# Patient Record
Sex: MALE | Race: BLACK OR AFRICAN AMERICAN | NOT HISPANIC OR LATINO | Employment: UNEMPLOYED | URBAN - METROPOLITAN AREA
[De-identification: names, ages, dates, MRNs, and addresses within clinical notes are randomized per-mention and may not be internally consistent; named-entity substitution may affect disease eponyms.]

---

## 2024-04-22 ENCOUNTER — TELEPHONE (OUTPATIENT)
Dept: PRIMARY CARE | Facility: CLINIC | Age: 54
End: 2024-04-22

## 2024-04-22 NOTE — TELEPHONE ENCOUNTER
Raissa  from [facility] would like medical records for a quality review, Fax# [phone]

## 2024-05-21 ENCOUNTER — APPOINTMENT (OUTPATIENT)
Dept: RADIOLOGY | Facility: HOSPITAL | Age: 54
DRG: 308 | End: 2024-05-21
Payer: COMMERCIAL

## 2024-05-21 ENCOUNTER — HOSPITAL ENCOUNTER (OUTPATIENT)
Dept: CARDIOLOGY | Facility: HOSPITAL | Age: 54
Discharge: HOME | DRG: 308 | End: 2024-05-21
Payer: COMMERCIAL

## 2024-05-21 ENCOUNTER — APPOINTMENT (OUTPATIENT)
Dept: CARDIOLOGY | Facility: HOSPITAL | Age: 54
DRG: 308 | End: 2024-05-21
Payer: COMMERCIAL

## 2024-05-21 ENCOUNTER — HOSPITAL ENCOUNTER (INPATIENT)
Facility: HOSPITAL | Age: 54
LOS: 3 days | Discharge: HOME | DRG: 308 | End: 2024-05-24
Attending: EMERGENCY MEDICINE | Admitting: INTERNAL MEDICINE
Payer: COMMERCIAL

## 2024-05-21 DIAGNOSIS — R94.31 ABNORMAL ELECTROCARDIOGRAM (ECG) (EKG): ICD-10-CM

## 2024-05-21 DIAGNOSIS — Z91.199 HISTORY OF NONCOMPLIANCE WITH MEDICAL TREATMENT: ICD-10-CM

## 2024-05-21 DIAGNOSIS — F17.219 CIGARETTE NICOTINE DEPENDENCE WITH NICOTINE-INDUCED DISORDER: ICD-10-CM

## 2024-05-21 DIAGNOSIS — I47.10 SVT (SUPRAVENTRICULAR TACHYCARDIA) (CMS-HCC): Primary | ICD-10-CM

## 2024-05-21 LAB
ALBUMIN SERPL-MCNC: 4 G/DL (ref 3.5–5)
ALP BLD-CCNC: 90 U/L (ref 35–125)
ALT SERPL-CCNC: 50 U/L (ref 5–40)
AMPHETAMINES UR QL SCN>1000 NG/ML: NEGATIVE
ANION GAP SERPL CALC-SCNC: 13 MMOL/L
APPEARANCE UR: CLEAR
AST SERPL-CCNC: 86 U/L (ref 5–40)
BARBITURATES UR QL SCN>300 NG/ML: NEGATIVE
BASOPHILS # BLD AUTO: 0.12 X10*3/UL (ref 0–0.1)
BASOPHILS NFR BLD AUTO: 0.8 %
BENZODIAZ UR QL SCN>300 NG/ML: POSITIVE
BILIRUB SERPL-MCNC: 0.3 MG/DL (ref 0.1–1.2)
BILIRUB UR STRIP.AUTO-MCNC: NEGATIVE MG/DL
BUN SERPL-MCNC: 20 MG/DL (ref 8–25)
BZE UR QL SCN>300 NG/ML: NEGATIVE
CALCIUM SERPL-MCNC: 9.1 MG/DL (ref 8.5–10.4)
CANNABINOIDS UR QL SCN>50 NG/ML: POSITIVE
CHLORIDE SERPL-SCNC: 102 MMOL/L (ref 97–107)
CO2 SERPL-SCNC: 23 MMOL/L (ref 24–31)
COLOR UR: YELLOW
CREAT SERPL-MCNC: 1.1 MG/DL (ref 0.4–1.6)
EGFRCR SERPLBLD CKD-EPI 2021: 80 ML/MIN/1.73M*2
EOSINOPHIL # BLD AUTO: 0.25 X10*3/UL (ref 0–0.7)
EOSINOPHIL NFR BLD AUTO: 1.6 %
ERYTHROCYTE [DISTWIDTH] IN BLOOD BY AUTOMATED COUNT: 18.5 % (ref 11.5–14.5)
EST. AVERAGE GLUCOSE BLD GHB EST-MCNC: 117 MG/DL
FENTANYL+NORFENTANYL UR QL SCN: POSITIVE
GLUCOSE BLD MANUAL STRIP-MCNC: 106 MG/DL (ref 74–99)
GLUCOSE BLD MANUAL STRIP-MCNC: 111 MG/DL (ref 74–99)
GLUCOSE BLD MANUAL STRIP-MCNC: 95 MG/DL (ref 74–99)
GLUCOSE SERPL-MCNC: 180 MG/DL (ref 65–99)
GLUCOSE UR STRIP.AUTO-MCNC: NORMAL MG/DL
HBA1C MFR BLD: 5.7 %
HCT VFR BLD AUTO: 45.4 % (ref 41–52)
HGB BLD-MCNC: 15 G/DL (ref 13.5–17.5)
HOLD SPECIMEN: NORMAL
IMM GRANULOCYTES # BLD AUTO: 0.08 X10*3/UL (ref 0–0.7)
IMM GRANULOCYTES NFR BLD AUTO: 0.5 % (ref 0–0.9)
KETONES UR STRIP.AUTO-MCNC: NEGATIVE MG/DL
LEUKOCYTE ESTERASE UR QL STRIP.AUTO: NEGATIVE
LYMPHOCYTES # BLD AUTO: 4.22 X10*3/UL (ref 1.2–4.8)
LYMPHOCYTES NFR BLD AUTO: 27.3 %
MAGNESIUM SERPL-MCNC: 2.1 MG/DL (ref 1.6–3.1)
MAGNESIUM SERPL-MCNC: 2.2 MG/DL (ref 1.6–3.1)
MCH RBC QN AUTO: 26.6 PG (ref 26–34)
MCHC RBC AUTO-ENTMCNC: 33 G/DL (ref 32–36)
MCV RBC AUTO: 81 FL (ref 80–100)
METHADONE UR QL SCN>300 NG/ML: NEGATIVE
MONOCYTES # BLD AUTO: 0.88 X10*3/UL (ref 0.1–1)
MONOCYTES NFR BLD AUTO: 5.7 %
MUCOUS THREADS #/AREA URNS AUTO: NORMAL /LPF
NEUTROPHILS # BLD AUTO: 9.9 X10*3/UL (ref 1.2–7.7)
NEUTROPHILS NFR BLD AUTO: 64.1 %
NITRITE UR QL STRIP.AUTO: NEGATIVE
NRBC BLD-RTO: 0 /100 WBCS (ref 0–0)
NT-PROBNP SERPL-MCNC: 582 PG/ML (ref 0–138)
OPIATES UR QL SCN>300 NG/ML: NEGATIVE
OXYCODONE UR QL: NEGATIVE
PCP UR QL SCN>25 NG/ML: NEGATIVE
PH UR STRIP.AUTO: 6 [PH]
PHOSPHATE SERPL-MCNC: 3.7 MG/DL (ref 2.5–4.5)
PLATELET # BLD AUTO: 408 X10*3/UL (ref 150–450)
POTASSIUM SERPL-SCNC: 4 MMOL/L (ref 3.4–5.1)
PROT SERPL-MCNC: 6.8 G/DL (ref 5.9–7.9)
PROT UR STRIP.AUTO-MCNC: NORMAL MG/DL
RBC # BLD AUTO: 5.64 X10*6/UL (ref 4.5–5.9)
RBC # UR STRIP.AUTO: NEGATIVE /UL
RBC #/AREA URNS AUTO: NORMAL /HPF
SODIUM SERPL-SCNC: 138 MMOL/L (ref 133–145)
SP GR UR STRIP.AUTO: 1.03
TROPONIN T SERPL-MCNC: 22 NG/L
TROPONIN T SERPL-MCNC: 35 NG/L
TROPONIN T SERPL-MCNC: 36 NG/L
TSH SERPL DL<=0.05 MIU/L-ACNC: 2.62 MIU/L (ref 0.27–4.2)
UROBILINOGEN UR STRIP.AUTO-MCNC: NORMAL MG/DL
WBC # BLD AUTO: 15.5 X10*3/UL (ref 4.4–11.3)
WBC #/AREA URNS AUTO: NORMAL /HPF

## 2024-05-21 PROCEDURE — 2500000004 HC RX 250 GENERAL PHARMACY W/ HCPCS (ALT 636 FOR OP/ED): Performed by: EMERGENCY MEDICINE

## 2024-05-21 PROCEDURE — 2500000005 HC RX 250 GENERAL PHARMACY W/O HCPCS: Performed by: EMERGENCY MEDICINE

## 2024-05-21 PROCEDURE — S4991 NICOTINE PATCH NONLEGEND: HCPCS

## 2024-05-21 PROCEDURE — 96374 THER/PROPH/DIAG INJ IV PUSH: CPT | Mod: 59

## 2024-05-21 PROCEDURE — 99223 1ST HOSP IP/OBS HIGH 75: CPT | Performed by: STUDENT IN AN ORGANIZED HEALTH CARE EDUCATION/TRAINING PROGRAM

## 2024-05-21 PROCEDURE — 94640 AIRWAY INHALATION TREATMENT: CPT

## 2024-05-21 PROCEDURE — 2500000004 HC RX 250 GENERAL PHARMACY W/ HCPCS (ALT 636 FOR OP/ED)

## 2024-05-21 PROCEDURE — 83036 HEMOGLOBIN GLYCOSYLATED A1C: CPT

## 2024-05-21 PROCEDURE — 99291 CRITICAL CARE FIRST HOUR: CPT

## 2024-05-21 PROCEDURE — 81001 URINALYSIS AUTO W/SCOPE: CPT

## 2024-05-21 PROCEDURE — 83735 ASSAY OF MAGNESIUM: CPT | Performed by: INTERNAL MEDICINE

## 2024-05-21 PROCEDURE — 93005 ELECTROCARDIOGRAM TRACING: CPT

## 2024-05-21 PROCEDURE — 71045 X-RAY EXAM CHEST 1 VIEW: CPT

## 2024-05-21 PROCEDURE — 9420000001 HC RT PATIENT EDUCATION 5 MIN

## 2024-05-21 PROCEDURE — 82947 ASSAY GLUCOSE BLOOD QUANT: CPT

## 2024-05-21 PROCEDURE — 5A2204Z RESTORATION OF CARDIAC RHYTHM, SINGLE: ICD-10-PCS | Performed by: EMERGENCY MEDICINE

## 2024-05-21 PROCEDURE — 80307 DRUG TEST PRSMV CHEM ANLYZR: CPT

## 2024-05-21 PROCEDURE — 93010 ELECTROCARDIOGRAM REPORT: CPT | Performed by: INTERNAL MEDICINE

## 2024-05-21 PROCEDURE — 80053 COMPREHEN METABOLIC PANEL: CPT | Performed by: EMERGENCY MEDICINE

## 2024-05-21 PROCEDURE — 99291 CRITICAL CARE FIRST HOUR: CPT | Mod: 25 | Performed by: EMERGENCY MEDICINE

## 2024-05-21 PROCEDURE — 96375 TX/PRO/DX INJ NEW DRUG ADDON: CPT | Mod: 59

## 2024-05-21 PROCEDURE — 71045 X-RAY EXAM CHEST 1 VIEW: CPT | Performed by: RADIOLOGY

## 2024-05-21 PROCEDURE — 96361 HYDRATE IV INFUSION ADD-ON: CPT

## 2024-05-21 PROCEDURE — 2500000001 HC RX 250 WO HCPCS SELF ADMINISTERED DRUGS (ALT 637 FOR MEDICARE OP)

## 2024-05-21 PROCEDURE — 99152 MOD SED SAME PHYS/QHP 5/>YRS: CPT | Performed by: EMERGENCY MEDICINE

## 2024-05-21 PROCEDURE — 93306 TTE W/DOPPLER COMPLETE: CPT

## 2024-05-21 PROCEDURE — 94664 DEMO&/EVAL PT USE INHALER: CPT

## 2024-05-21 PROCEDURE — 84484 ASSAY OF TROPONIN QUANT: CPT | Performed by: EMERGENCY MEDICINE

## 2024-05-21 PROCEDURE — 85025 COMPLETE CBC W/AUTO DIFF WBC: CPT | Performed by: EMERGENCY MEDICINE

## 2024-05-21 PROCEDURE — 84443 ASSAY THYROID STIM HORMONE: CPT | Performed by: EMERGENCY MEDICINE

## 2024-05-21 PROCEDURE — 84100 ASSAY OF PHOSPHORUS: CPT | Performed by: INTERNAL MEDICINE

## 2024-05-21 PROCEDURE — 83735 ASSAY OF MAGNESIUM: CPT

## 2024-05-21 PROCEDURE — 36415 COLL VENOUS BLD VENIPUNCTURE: CPT | Performed by: EMERGENCY MEDICINE

## 2024-05-21 PROCEDURE — 2500000002 HC RX 250 W HCPCS SELF ADMINISTERED DRUGS (ALT 637 FOR MEDICARE OP, ALT 636 FOR OP/ED)

## 2024-05-21 PROCEDURE — 2020000001 HC ICU ROOM DAILY

## 2024-05-21 PROCEDURE — 83880 ASSAY OF NATRIURETIC PEPTIDE: CPT | Performed by: EMERGENCY MEDICINE

## 2024-05-21 PROCEDURE — 2500000005 HC RX 250 GENERAL PHARMACY W/O HCPCS

## 2024-05-21 RX ORDER — IBUPROFEN 200 MG
1 TABLET ORAL DAILY
Status: DISCONTINUED | OUTPATIENT
Start: 2024-05-21 | End: 2024-05-24 | Stop reason: HOSPADM

## 2024-05-21 RX ORDER — METOPROLOL TARTRATE 25 MG/1
12.5 TABLET, FILM COATED ORAL 2 TIMES DAILY
Status: DISCONTINUED | OUTPATIENT
Start: 2024-05-21 | End: 2024-05-21

## 2024-05-21 RX ORDER — ONDANSETRON HYDROCHLORIDE 2 MG/ML
4 INJECTION, SOLUTION INTRAVENOUS ONCE
Status: COMPLETED | OUTPATIENT
Start: 2024-05-21 | End: 2024-05-21

## 2024-05-21 RX ORDER — DEXTROSE 50 % IN WATER (D50W) INTRAVENOUS SYRINGE
25
Status: DISCONTINUED | OUTPATIENT
Start: 2024-05-21 | End: 2024-05-24 | Stop reason: HOSPADM

## 2024-05-21 RX ORDER — MULTIVIT-MIN/IRON FUM/FOLIC AC 7.5 MG-4
1 TABLET ORAL DAILY
Status: DISCONTINUED | OUTPATIENT
Start: 2024-05-21 | End: 2024-05-24 | Stop reason: HOSPADM

## 2024-05-21 RX ORDER — LANOLIN ALCOHOL/MO/W.PET/CERES
100 CREAM (GRAM) TOPICAL DAILY
Status: DISCONTINUED | OUTPATIENT
Start: 2024-05-21 | End: 2024-05-24 | Stop reason: HOSPADM

## 2024-05-21 RX ORDER — NICOTINE 7MG/24HR
1 PATCH, TRANSDERMAL 24 HOURS TRANSDERMAL DAILY
Status: DISCONTINUED | OUTPATIENT
Start: 2024-07-16 | End: 2024-05-24 | Stop reason: HOSPADM

## 2024-05-21 RX ORDER — METOPROLOL TARTRATE 25 MG/1
12.5 TABLET, FILM COATED ORAL ONCE
Status: COMPLETED | OUTPATIENT
Start: 2024-05-21 | End: 2024-05-21

## 2024-05-21 RX ORDER — METOPROLOL TARTRATE 25 MG/1
25 TABLET, FILM COATED ORAL 2 TIMES DAILY
Status: DISCONTINUED | OUTPATIENT
Start: 2024-05-21 | End: 2024-05-21

## 2024-05-21 RX ORDER — METOPROLOL TARTRATE 1 MG/ML
5 INJECTION, SOLUTION INTRAVENOUS ONCE
Status: COMPLETED | OUTPATIENT
Start: 2024-05-21 | End: 2024-05-21

## 2024-05-21 RX ORDER — HYDRALAZINE HYDROCHLORIDE 20 MG/ML
10 INJECTION INTRAMUSCULAR; INTRAVENOUS EVERY 6 HOURS PRN
Status: DISCONTINUED | OUTPATIENT
Start: 2024-05-21 | End: 2024-05-22

## 2024-05-21 RX ORDER — MIDAZOLAM HYDROCHLORIDE 5 MG/ML
5 INJECTION INTRAMUSCULAR; INTRAVENOUS ONCE
Status: COMPLETED | OUTPATIENT
Start: 2024-05-21 | End: 2024-05-21

## 2024-05-21 RX ORDER — FOLIC ACID 1 MG/1
1 TABLET ORAL DAILY
Status: DISCONTINUED | OUTPATIENT
Start: 2024-05-21 | End: 2024-05-24 | Stop reason: HOSPADM

## 2024-05-21 RX ORDER — DEXTROSE 50 % IN WATER (D50W) INTRAVENOUS SYRINGE
12.5
Status: DISCONTINUED | OUTPATIENT
Start: 2024-05-21 | End: 2024-05-24 | Stop reason: HOSPADM

## 2024-05-21 RX ORDER — MIDAZOLAM HYDROCHLORIDE 5 MG/ML
5 INJECTION INTRAMUSCULAR; INTRAVENOUS ONCE
Status: DISCONTINUED | OUTPATIENT
Start: 2024-05-21 | End: 2024-05-21

## 2024-05-21 RX ORDER — ENOXAPARIN SODIUM 100 MG/ML
40 INJECTION SUBCUTANEOUS
Status: DISCONTINUED | OUTPATIENT
Start: 2024-05-21 | End: 2024-05-24 | Stop reason: HOSPADM

## 2024-05-21 RX ORDER — METOPROLOL TARTRATE 25 MG/1
25 TABLET, FILM COATED ORAL 2 TIMES DAILY
Status: DISCONTINUED | OUTPATIENT
Start: 2024-05-21 | End: 2024-05-23

## 2024-05-21 RX ORDER — ADENOSINE 3 MG/ML
12 INJECTION, SOLUTION INTRAVENOUS ONCE
Status: COMPLETED | OUTPATIENT
Start: 2024-05-21 | End: 2024-05-21

## 2024-05-21 RX ORDER — IPRATROPIUM BROMIDE AND ALBUTEROL SULFATE 2.5; .5 MG/3ML; MG/3ML
3 SOLUTION RESPIRATORY (INHALATION)
Status: DISCONTINUED | OUTPATIENT
Start: 2024-05-21 | End: 2024-05-22

## 2024-05-21 RX ORDER — ALBUTEROL SULFATE 90 UG/1
1 AEROSOL, METERED RESPIRATORY (INHALATION) EVERY 6 HOURS PRN
COMMUNITY

## 2024-05-21 RX ORDER — FENTANYL CITRATE 50 UG/ML
50 INJECTION, SOLUTION INTRAMUSCULAR; INTRAVENOUS ONCE
Status: DISCONTINUED | OUTPATIENT
Start: 2024-05-21 | End: 2024-05-21

## 2024-05-21 RX ORDER — ADENOSINE 3 MG/ML
6 INJECTION, SOLUTION INTRAVENOUS ONCE
Status: COMPLETED | OUTPATIENT
Start: 2024-05-21 | End: 2024-05-21

## 2024-05-21 RX ORDER — IBUPROFEN 200 MG
1 TABLET ORAL DAILY
Status: DISCONTINUED | OUTPATIENT
Start: 2024-07-02 | End: 2024-05-24 | Stop reason: HOSPADM

## 2024-05-21 RX ORDER — FENTANYL CITRATE 50 UG/ML
50 INJECTION, SOLUTION INTRAMUSCULAR; INTRAVENOUS ONCE
Status: COMPLETED | OUTPATIENT
Start: 2024-05-21 | End: 2024-05-21

## 2024-05-21 RX ADMIN — ADENOSINE 6 MG: 3 INJECTION INTRAVENOUS at 05:11

## 2024-05-21 RX ADMIN — AMIODARONE HYDROCHLORIDE 1 MG/MIN: 1.8 INJECTION, SOLUTION INTRAVENOUS at 11:57

## 2024-05-21 RX ADMIN — SODIUM CHLORIDE 1000 ML: 900 INJECTION, SOLUTION INTRAVENOUS at 05:10

## 2024-05-21 RX ADMIN — METOPROLOL TARTRATE 5 MG: 5 INJECTION INTRAVENOUS at 10:30

## 2024-05-21 RX ADMIN — Medication 1 TABLET: at 10:49

## 2024-05-21 RX ADMIN — HYDRALAZINE HYDROCHLORIDE 10 MG: 20 INJECTION INTRAMUSCULAR; INTRAVENOUS at 17:56

## 2024-05-21 RX ADMIN — AMIODARONE HYDROCHLORIDE 1 MG/MIN: 1.8 INJECTION, SOLUTION INTRAVENOUS at 07:14

## 2024-05-21 RX ADMIN — IPRATROPIUM BROMIDE AND ALBUTEROL SULFATE 3 ML: .5; 3 SOLUTION RESPIRATORY (INHALATION) at 20:33

## 2024-05-21 RX ADMIN — AMIODARONE HYDROCHLORIDE 150 MG: 1.5 INJECTION, SOLUTION INTRAVENOUS at 07:14

## 2024-05-21 RX ADMIN — ENOXAPARIN SODIUM 40 MG: 40 INJECTION SUBCUTANEOUS at 10:49

## 2024-05-21 RX ADMIN — ADENOSINE 12 MG: 3 INJECTION INTRAVENOUS at 05:14

## 2024-05-21 RX ADMIN — FENTANYL CITRATE 50 MCG: 50 INJECTION INTRAMUSCULAR; INTRAVENOUS at 05:19

## 2024-05-21 RX ADMIN — MIDAZOLAM 5 MG: 5 INJECTION INTRAMUSCULAR; INTRAVENOUS at 05:26

## 2024-05-21 RX ADMIN — Medication 100 MG: at 10:49

## 2024-05-21 RX ADMIN — IPRATROPIUM BROMIDE AND ALBUTEROL SULFATE 3 ML: .5; 3 SOLUTION RESPIRATORY (INHALATION) at 14:22

## 2024-05-21 RX ADMIN — ONDANSETRON 4 MG: 2 INJECTION INTRAMUSCULAR; INTRAVENOUS at 05:18

## 2024-05-21 RX ADMIN — Medication 2 L/MIN: at 05:10

## 2024-05-21 RX ADMIN — METOPROLOL TARTRATE 12.5 MG: 25 TABLET, FILM COATED ORAL at 15:19

## 2024-05-21 RX ADMIN — METOPROLOL TARTRATE 25 MG: 25 TABLET, FILM COATED ORAL at 20:18

## 2024-05-21 RX ADMIN — Medication 2 L/MIN: at 08:00

## 2024-05-21 RX ADMIN — NICOTINE 1 PATCH: 21 PATCH, EXTENDED RELEASE TRANSDERMAL at 10:49

## 2024-05-21 RX ADMIN — FOLIC ACID 1 MG: 1 TABLET ORAL at 10:49

## 2024-05-21 SDOH — SOCIAL STABILITY: SOCIAL INSECURITY: DO YOU FEEL ANYONE HAS EXPLOITED OR TAKEN ADVANTAGE OF YOU FINANCIALLY OR OF YOUR PERSONAL PROPERTY?: NO

## 2024-05-21 SDOH — SOCIAL STABILITY: SOCIAL INSECURITY: DO YOU FEEL UNSAFE GOING BACK TO THE PLACE WHERE YOU ARE LIVING?: NO

## 2024-05-21 SDOH — SOCIAL STABILITY: SOCIAL INSECURITY: HAVE YOU HAD THOUGHTS OF HARMING ANYONE ELSE?: NO

## 2024-05-21 SDOH — SOCIAL STABILITY: SOCIAL INSECURITY: HAS ANYONE EVER THREATENED TO HURT YOUR FAMILY OR YOUR PETS?: NO

## 2024-05-21 SDOH — SOCIAL STABILITY: SOCIAL INSECURITY: ABUSE: ADULT

## 2024-05-21 SDOH — SOCIAL STABILITY: SOCIAL INSECURITY: ARE YOU OR HAVE YOU BEEN THREATENED OR ABUSED PHYSICALLY, EMOTIONALLY, OR SEXUALLY BY ANYONE?: NO

## 2024-05-21 SDOH — SOCIAL STABILITY: SOCIAL INSECURITY: WERE YOU ABLE TO COMPLETE ALL THE BEHAVIORAL HEALTH SCREENINGS?: YES

## 2024-05-21 SDOH — SOCIAL STABILITY: SOCIAL INSECURITY: HAVE YOU HAD ANY THOUGHTS OF HARMING ANYONE ELSE?: NO

## 2024-05-21 SDOH — SOCIAL STABILITY: SOCIAL INSECURITY: DOES ANYONE TRY TO KEEP YOU FROM HAVING/CONTACTING OTHER FRIENDS OR DOING THINGS OUTSIDE YOUR HOME?: NO

## 2024-05-21 SDOH — SOCIAL STABILITY: SOCIAL INSECURITY: ARE THERE ANY APPARENT SIGNS OF INJURIES/BEHAVIORS THAT COULD BE RELATED TO ABUSE/NEGLECT?: NO

## 2024-05-21 ASSESSMENT — ENCOUNTER SYMPTOMS
FEVER: 0
SHORTNESS OF BREATH: 1
CONFUSION: 0
DIZZINESS: 0
PALPITATIONS: 1

## 2024-05-21 ASSESSMENT — LIFESTYLE VARIABLES
HAVE YOU EVER FELT YOU SHOULD CUT DOWN ON YOUR DRINKING: NO
HAS A RELATIVE, FRIEND, DOCTOR, OR ANOTHER HEALTH PROFESSIONAL EXPRESSED CONCERN ABOUT YOUR DRINKING OR SUGGESTED YOU CUT DOWN: NO
TOTAL SCORE: 0
NAUSEA AND VOMITING: NO NAUSEA AND NO VOMITING
NAUSEA AND VOMITING: NO NAUSEA AND NO VOMITING
AGITATION: NORMAL ACTIVITY
VISUAL DISTURBANCES: NOT PRESENT
HAVE PEOPLE ANNOYED YOU BY CRITICIZING YOUR DRINKING: NO
ANXIETY: NO ANXIETY, AT EASE
AUDITORY DISTURBANCES: NOT PRESENT
ORIENTATION AND CLOUDING OF SENSORIUM: ORIENTED AND CAN DO SERIAL ADDITIONS
HOW OFTEN DO YOU HAVE 6 OR MORE DRINKS ON ONE OCCASION: DAILY OR ALMOST DAILY
AUDITORY DISTURBANCES: NOT PRESENT
NAUSEA AND VOMITING: NO NAUSEA AND NO VOMITING
HOW OFTEN DURING THE LAST YEAR HAVE YOU BEEN UNABLE TO REMEMBER WHAT HAPPENED THE NIGHT BEFORE BECAUSE YOU HAD BEEN DRINKING: NEVER
TOTAL SCORE: 0
PAROXYSMAL SWEATS: NO SWEAT VISIBLE
HOW OFTEN DURING THE LAST YEAR HAVE YOU FAILED TO DO WHAT WAS NORMALLY EXPECTED FROM YOU BECAUSE OF DRINKING: NEVER
HOW OFTEN DO YOU HAVE A DRINK CONTAINING ALCOHOL: 4 OR MORE TIMES A WEEK
ANXIETY: NO ANXIETY, AT EASE
AUDIT-C TOTAL SCORE: 8
AGITATION: NORMAL ACTIVITY
HOW OFTEN DURING THE LAST YEAR HAVE YOU NEEDED AN ALCOHOLIC DRINK FIRST THING IN THE MORNING TO GET YOURSELF GOING AFTER A NIGHT OF HEAVY DRINKING: NEVER
TOTAL SCORE: 0
HEADACHE, FULLNESS IN HEAD: NOT PRESENT
ORIENTATION AND CLOUDING OF SENSORIUM: ORIENTED AND CAN DO SERIAL ADDITIONS
AUDIT TOTAL SCORE: 8
EVER FELT BAD OR GUILTY ABOUT YOUR DRINKING: NO
SKIP TO QUESTIONS 9-10: 0
PAROXYSMAL SWEATS: NO SWEAT VISIBLE
ORIENTATION AND CLOUDING OF SENSORIUM: ORIENTED AND CAN DO SERIAL ADDITIONS
NAUSEA AND VOMITING: NO NAUSEA AND NO VOMITING
AUDITORY DISTURBANCES: NOT PRESENT
ANXIETY: NO ANXIETY, AT EASE
TOTAL SCORE: 0
HOW MANY STANDARD DRINKS CONTAINING ALCOHOL DO YOU HAVE ON A TYPICAL DAY: 1 OR 2
AUDITORY DISTURBANCES: NOT PRESENT
HEADACHE, FULLNESS IN HEAD: NOT PRESENT
HEADACHE, FULLNESS IN HEAD: NOT PRESENT
VISUAL DISTURBANCES: NOT PRESENT
TREMOR: NO TREMOR
ORIENTATION AND CLOUDING OF SENSORIUM: ORIENTED AND CAN DO SERIAL ADDITIONS
ORIENTATION AND CLOUDING OF SENSORIUM: ORIENTED AND CAN DO SERIAL ADDITIONS
HOW OFTEN DURING THE LAST YEAR HAVE YOU HAD A FEELING OF GUILT OR REMORSE AFTER DRINKING: NEVER
TOTAL SCORE: 0
ANXIETY: NO ANXIETY, AT EASE
TREMOR: NO TREMOR
AGITATION: NORMAL ACTIVITY
TREMOR: NO TREMOR
HOW OFTEN DURING THE LAST YEAR HAVE YOU FOUND THAT YOU WERE NOT ABLE TO STOP DRINKING ONCE YOU HAD STARTED: NEVER
TREMOR: NO TREMOR
TREMOR: NO TREMOR
AUDITORY DISTURBANCES: NOT PRESENT
AGITATION: NORMAL ACTIVITY
HEADACHE, FULLNESS IN HEAD: NOT PRESENT
PAROXYSMAL SWEATS: NO SWEAT VISIBLE
PAROXYSMAL SWEATS: NO SWEAT VISIBLE
AUDIT-C TOTAL SCORE: 8
NAUSEA AND VOMITING: NO NAUSEA AND NO VOMITING
ANXIETY: NO ANXIETY, AT EASE
HAVE YOU OR SOMEONE ELSE BEEN INJURED AS A RESULT OF YOUR DRINKING: NO
AUDIT TOTAL SCORE: 0
AGITATION: NORMAL ACTIVITY
TOTAL SCORE: 0
VISUAL DISTURBANCES: NOT PRESENT
VISUAL DISTURBANCES: NOT PRESENT
HEADACHE, FULLNESS IN HEAD: NOT PRESENT
EVER HAD A DRINK FIRST THING IN THE MORNING TO STEADY YOUR NERVES TO GET RID OF A HANGOVER: NO
VISUAL DISTURBANCES: NOT PRESENT
PAROXYSMAL SWEATS: NO SWEAT VISIBLE

## 2024-05-21 ASSESSMENT — COGNITIVE AND FUNCTIONAL STATUS - GENERAL
DAILY ACTIVITIY SCORE: 24
MOBILITY SCORE: 24
DAILY ACTIVITIY SCORE: 24
PATIENT BASELINE BEDBOUND: NO
MOBILITY SCORE: 24

## 2024-05-21 ASSESSMENT — ACTIVITIES OF DAILY LIVING (ADL)
HEARING - LEFT EAR: FUNCTIONAL
FEEDING YOURSELF: INDEPENDENT
JUDGMENT_ADEQUATE_SAFELY_COMPLETE_DAILY_ACTIVITIES: YES
DRESSING YOURSELF: INDEPENDENT
PATIENT'S MEMORY ADEQUATE TO SAFELY COMPLETE DAILY ACTIVITIES?: YES
LACK_OF_TRANSPORTATION: NO
BATHING: INDEPENDENT
HEARING - RIGHT EAR: FUNCTIONAL
GROOMING: INDEPENDENT
WALKS IN HOME: INDEPENDENT
TOILETING: INDEPENDENT
ADEQUATE_TO_COMPLETE_ADL: YES

## 2024-05-21 ASSESSMENT — COLUMBIA-SUICIDE SEVERITY RATING SCALE - C-SSRS
1. IN THE PAST MONTH, HAVE YOU WISHED YOU WERE DEAD OR WISHED YOU COULD GO TO SLEEP AND NOT WAKE UP?: NO
2. HAVE YOU ACTUALLY HAD ANY THOUGHTS OF KILLING YOURSELF?: NO
6. HAVE YOU EVER DONE ANYTHING, STARTED TO DO ANYTHING, OR PREPARED TO DO ANYTHING TO END YOUR LIFE?: NO

## 2024-05-21 ASSESSMENT — PATIENT HEALTH QUESTIONNAIRE - PHQ9
2. FEELING DOWN, DEPRESSED OR HOPELESS: NOT AT ALL
SUM OF ALL RESPONSES TO PHQ9 QUESTIONS 1 & 2: 0
1. LITTLE INTEREST OR PLEASURE IN DOING THINGS: NOT AT ALL

## 2024-05-21 ASSESSMENT — PAIN DESCRIPTION - PROGRESSION: CLINICAL_PROGRESSION: NOT CHANGED

## 2024-05-21 ASSESSMENT — PAIN DESCRIPTION - ONSET: ONSET: AWAKENED FROM SLEEP

## 2024-05-21 ASSESSMENT — PAIN SCALES - GENERAL
PAINLEVEL_OUTOF10: 3
PAINLEVEL_OUTOF10: 0 - NO PAIN
PAINLEVEL_OUTOF10: 5 - MODERATE PAIN
PAINLEVEL_OUTOF10: 0 - NO PAIN
PAINLEVEL_OUTOF10: 0 - NO PAIN

## 2024-05-21 ASSESSMENT — PAIN DESCRIPTION - PAIN TYPE: TYPE: ACUTE PAIN

## 2024-05-21 ASSESSMENT — PAIN - FUNCTIONAL ASSESSMENT
PAIN_FUNCTIONAL_ASSESSMENT: 0-10

## 2024-05-21 ASSESSMENT — PAIN DESCRIPTION - LOCATION: LOCATION: CHEST

## 2024-05-21 ASSESSMENT — PAIN DESCRIPTION - DESCRIPTORS
DESCRIPTORS: ACHING;SQUEEZING;PRESSURE
DESCRIPTORS: DULL

## 2024-05-21 ASSESSMENT — PAIN DESCRIPTION - FREQUENCY: FREQUENCY: CONSTANT/CONTINUOUS

## 2024-05-21 NOTE — ED TRIAGE NOTES
Pt presents ambulatory through triage with concern for HTN. Pt states he woke up feeling nauseas and weak. Pt states he has been off of his BP mds for approx 3 months. Pt's  during triage. EKG done immediately. Pt taken back to a room immediately and placed on the monitor.

## 2024-05-21 NOTE — ED PROVIDER NOTES
Department of Emergency Medicine   ED  Provider Note  Admit Date/RoomTime: 5/21/2024  4:55 AM  ED Room: 11/11-A        History of Present Illness:  Chief Complaint   Patient presents with   • Rapid Heart Rate     Patient was initially seen and evaluated by attending physician upon onset of symptoms on arrival.  Assumed care of patient upon my arrival to the emergency department at around 8:30 AM    Los Rubio is a 53 y.o. male history of elevated heart rate, hypertension asthma presenting to the ED for elevated heart rate, patient reports he woke up in the middle of the night and was having heart palpitations he thought initially he was having an asthma attack use his inhaler with no improvement.  He complains of shortness of breath and some chest discomfort upon arrival this is since resolved.  Patient was found to have SVT upon arrival he denies any fever chills cough congestion runny nose sore throat.  No abdominal pain no nausea no vomiting no drug use.  No over-the-counter medication use.  No history of thyroid issues.  No calf pain no redness or warmth.  No long distance travel  Patient had required medication with adenosine, normal saline Valsalva maneuvers with no improvement of his symptoms or heart rate cardioverted at 100 J with no improvement consult to cardiology recommending amiodarone drip which was now being administered upon my evaluation.  Review of Systems:   Pertinent positives and negatives are stated within HPI, all other systems reviewed and are negative.        --------------------------------------------- PAST HISTORY ---------------------------------------------  Past Medical History:  has a past medical history of Hypertension.  Past Surgical History:  has no past surgical history on file.  Social History:  reports that he has been smoking cigarettes. He started smoking about 34 years ago. He has a 68.8 pack-year smoking history. He does not have any smokeless tobacco history on file.  "He reports that he does not currently use drugs.  Family History: family history is not on file.. Unless otherwise noted, family history is non contributory  The patient’s home medications have been reviewed.  Allergies: Shellfish derived        ---------------------------------------------------PHYSICAL EXAM--------------------------------------    GENERAL APPEARANCE: Awake and alert.   VITAL SIGNS: As per the nurses' triage record.  Heart rate elevated 180s  HEENT: Normocephalic, atraumatic. Extraocular muscles are intact. Pupils equal round and reactive to light. Conjunctiva are pink. Negative scleral icterus. Mucous membranes are moist. Tongue in the midline. Pharynx was without erythema or exudates, uvula midline  NECK: Soft Nontender and supple, full gross ROM, no meningeal signs.  CHEST: Nontender to palpation. Clear to auscultation bilaterally. No rales, rhonchi, or wheezing.   HEART: S1, S2.  SVT regular rate and rhythm. No murmurs, gallops or rubs.  Strong and equal pulses in the extremities.   ABDOMEN: Soft, nontender, nondistended, positive bowel sounds, no palpable masses.  MUSCULCSKELETAL: The calves are nontender to palpation. Full gross active range of motion. Ambulating on own with no acute difficulties  NEUROLOGICAL: Awake, alert and oriented x 3. Power intact in the upper and lower extremities. Sensation is intact to light touch in the upper and lower extremities.   IMMUNOLOGICAL: No lymphatic streaking noted   DERM: No petechiae, rashes, or ecchymoses.          ------------------------- NURSING NOTES AND VITALS REVIEWED ---------------------------  The nursing notes within the ED encounter and vital signs as below have been reviewed by myself  BP (!) 174/123   Pulse 62   Temp 36.7 °C (98.1 °F)   Resp 20   Ht 1.803 m (5' 11\")   Wt 74.1 kg (163 lb 5.8 oz)   SpO2 100%   BMI 22.78 kg/m²     Oxygen Saturation Interpretation: 98% room air    The cardiac monitor revealed sinus tachycardia with a " heart rate in the 180s as interpreted by me. The cardiac monitor was ordered secondary to the patient's heart rate and to monitor the patient for dysrhythmia.       The patient’s available past medical records and past encounters were reviewed.          -----------------------DIAGNOSTIC RESULTS------------------------  LABS:    Labs Reviewed   CBC WITH AUTO DIFFERENTIAL - Abnormal       Result Value    WBC 15.5 (*)     nRBC 0.0      RBC 5.64      Hemoglobin 15.0      Hematocrit 45.4      MCV 81      MCH 26.6      MCHC 33.0      RDW 18.5 (*)     Platelets 408      Neutrophils % 64.1      Immature Granulocytes %, Automated 0.5      Lymphocytes % 27.3      Monocytes % 5.7      Eosinophils % 1.6      Basophils % 0.8      Neutrophils Absolute 9.90 (*)     Immature Granulocytes Absolute, Automated 0.08      Lymphocytes Absolute 4.22      Monocytes Absolute 0.88      Eosinophils Absolute 0.25      Basophils Absolute 0.12 (*)    COMPREHENSIVE METABOLIC PANEL - Abnormal    Glucose 180 (*)     Sodium 138      Potassium 4.0      Chloride 102      Bicarbonate 23 (*)     Urea Nitrogen 20      Creatinine 1.10      eGFR 80      Calcium 9.1      Albumin 4.0      Alkaline Phosphatase 90      Total Protein 6.8      AST 86 (*)     Bilirubin, Total 0.3      ALT 50 (*)     Anion Gap 13     N-TERMINAL PROBNP - Abnormal    PROBNP 582 (*)     Narrative:     Reference ranges are based on clinical submission data. These ranges represent the 95th percentile of normal cut-off points. As NT Pro- BNP values approach 1000 pg/ml, clinical symptoms are more likely associated with CHF.   SERIAL TROPONIN, INITIAL ([facility]) - Abnormal    Troponin T, High Sensitivity 22 (*)    SERIAL TROPONIN,  2 HOUR ([facility]) - Abnormal    Troponin T, High Sensitivity 35 (*)    SERIAL TROPONIN, 6 HOUR ([facility]) - Abnormal    Troponin T, High Sensitivity 36 (*)    DRUG SCREEN,URINE - Abnormal    Amphetamine Screen, Urine Negative      Barbiturate Screen, Urine Negative       Benzodiazepines Screen, Urine Positive (*)     Cannabinoid Screen, Urine Positive (*)     Cocaine Metabolite Screen, Urine Negative      Fentanyl Screen, Urine Positive (*)     Methadone Screen, Urine Negative      Opiate Screen, Urine Negative      Oxycodone Screen, Urine Negative      PCP Screen, Urine Negative      Narrative:     These toxicological screening tests provide unconfirmed qualitative measurements to aid in treatment and diagnosis in cases of drug use or overdose. This test is used only for medical purposes. A positive result does not indicate or measure intoxication. For specific test performance or pathologist consultation, please contact the Laboratory.    The following threshold concentrations are used for these analyses.Values at or above the threshold concentration are reported as positive. Values below the threshold are reported as negative.    Drug /Screening Threshold                                                                                                 THC/CANNABINOIDS................50 ng/ml  METHADONE......................300 ng/ml  COCAINE METABOLITES............300 ng/ml  BENZODIAZEPINE.................300 ng/ml  PCP.............................25 ng/ml  OPIATE.........................300 ng/ml  AMPHETAMINE/ECSTASY...........1000 ng/ml  BARBITURATE....................200 ng/ml  OXYCODONE......................100 ng/ml  FENTANYL.........................5 ng/ml       HEMOGLOBIN A1C - Abnormal    Hemoglobin A1C 5.7 (*)     Estimated Average Glucose 117      Narrative:     Diagnosis of Diabetes-Adults  Non-Diabetic: < or = 5.6%  Increased risk for developing diabetes: 5.7-6.4%  Diagnostic of diabetes: > or = 6.5%    Monitoring of Diabetes  Age (y)....................... Therapeutic Goal (%)  Adults: >18.........................<7.0  Pediatrics: 13-18...................<7.5  Pediatrics: 7-12....................<8.0  Pediatrics: 0-6..................... 7.5-8.5    American Diabetes  Association. Diabetes Care 33(S1), Jan 2010       POCT GLUCOSE - Abnormal    POCT Glucose 111 (*)    POCT GLUCOSE - Abnormal    POCT Glucose 106 (*)    TSH WITH REFLEX TO FREE T4 IF ABNORMAL - Normal    Thyroid Stimulating Hormone 2.62      Narrative:     TSH testing is performed using different testing methodology at [facility] than at other [facility]. Direct result comparisons should only be made within the same method.     URINALYSIS WITH REFLEX CULTURE AND MICROSCOPIC - Normal    Color, Urine Yellow      Appearance, Urine Clear      Specific Gravity, Urine 1.029      pH, Urine 6.0      Protein, Urine 20 (TRACE)      Glucose, Urine Normal      Blood, Urine NEGATIVE      Ketones, Urine NEGATIVE      Bilirubin, Urine NEGATIVE      Urobilinogen, Urine Normal      Nitrite, Urine NEGATIVE      Leukocyte Esterase, Urine NEGATIVE     MAGNESIUM - Normal    Magnesium 2.20     PHOSPHORUS - Normal    Phosphorus 3.7     MAGNESIUM - Normal    Magnesium 2.10     TROPONIN T SERIES, HIGH SENSITIVITY (0, 2 HR, 6 HR)    Narrative:     The following orders were created for panel order Troponin T Series, High Sensitivity (0, 2HR, 6HR).  Procedure                               Abnormality         Status                     ---------                               -----------         ------                     Serial Troponin, Initial...[[id number]]  Abnormal            Final result               Serial Troponin, 2 Hour ...[[id number]]  Abnormal            Final result               Serial Troponin, 6 Hour ...[[id number]]  Abnormal            Final result                 Please view results for these tests on the individual orders.   URINALYSIS WITH REFLEX CULTURE AND MICROSCOPIC    Narrative:     The following orders were created for panel order Urinalysis with Reflex Culture and Microscopic.  Procedure                               Abnormality         Status                     ---------                                -----------         ------                     Urinalysis with Reflex C...[[id number]]  Normal              Final result               Extra Urine Gray Tube[[id number]]                            In process                   Please view results for these tests on the individual orders.   EXTRA URINE GRAY TUBE   POCT GLUCOSE METER   POCT GLUCOSE METER   POCT GLUCOSE METER   POCT GLUCOSE METER   URINALYSIS MICROSCOPIC WITH REFLEX CULTURE    WBC, Urine 1-5      RBC, Urine NONE      Mucus, Urine FEW         As interpreted by me, the above displayed labs are abnormal. All other labs obtained during this visit were within normal range or not returned as of this dictation.      EKG Interpretation    SVT per attending note        Transthoracic Echo (TTE) Complete         XR chest 1 view   Final Result   Thoracic spine DJD as described.        Remainder of the exam was negative.        MACRO:   None        Signed by: Cosme Lo 5/21/2024 8:45 AM   Dictation workstation:   [id number]              Transthoracic Echo (TTE) Complete         XR chest 1 view   Final Result   Thoracic spine DJD as described.        Remainder of the exam was negative.        MACRO:   None        Signed by: Cosme Lo 5/21/2024 8:45 AM   Dictation workstation:   [id number]              ------------------------------ ED COURSE/MEDICAL DECISION MAKING----------------------  Medical Decision Making:   Exam: A medically appropriate exam performed, outlined above, given the known history and presentation.    History obtained from: Review of medical record nursing notes patient      Social Determinants of Health considered during this visit: Takes care of himself at home      PAST MEDICAL HISTORY/Chronic Conditions Affecting Care     has a past medical history of Hypertension.       CC/HPI Summary, Social Determinants of health, Records Reviewed, DDx, testing done/not done, ED Course, Reassessment, disposition considerations/shared decision making  with patient, consults, disposition:   Presents to the emergency department with complaints of rapid heart rate intermittent chest pain  Plan  EKG  Normal saline  CBC  CMP  TSH  Troponin  proBNP  Chest x-rayThoracic spine DJD as described.      Remainder of the exam was negative.  Adenosine  Amiodarone  Cardiovert  Medical Decision Making/Differential Diagnosis:  Differentials include but not limited to electrolyte abnormality versus arrhythmia versus acute coronary syndrome versus CHF versus dehydration PE is also within the differential  Patient was initially seen evaluated by attending physician in the emergency department for several hours.  Requiring adenosine cardioversion normal saline bolus staples removal with no improvement.  Placed on amiodarone drip.  Reviewed laboratory data noted to have mild leukocytosis patient denies cough congestion runny nose no upper respiratory-like symptoms no urinary symptoms.  TSH was normal.  proBNP mildly elevated at 582.  Patient is not anemic.  Electrolytes within normal limits.  Normal renal function.  LFTs are elevated.  He is not hypoglycemic.  Troponin is 20 2 repeat troponin 35 EKG per attending note SVT.  Discussed case with intensive care unit team is agreed to admit the patient under their service for further evaluation and treatment.  Patient is currently chest pain-free at this time impression sustained SVT/sinus tachycardia  Patient seen and evaluated with attending physician Dr. Pacheco  PROCEDURES  Unless otherwise noted below, none      CONSULTS:   IP CONSULT TO CARDIOLOGY  IP CONSULT TO SOCIAL WORK      ED Course as of 05/23/24 1108   Tue May 21, 2024   0834 Discussed case with Dr. Brink and Team via secure chat.  Has agreed to take the patient under his service ICU level of care for further evaluation and treatment [TB]   u May 23, 2024   0902 ECG performed on May 21, 2024 at 5:34 AM and interpreted by me at 5:35 AM showing a supraventricular  tachycardia with a ventricular rate of 193, no previous for comparison.  No STEMI.  No axis deviation.  Otherwise intervals within normal limits. [EG]      ED Course User Index  [EG] Denisse Lam MD  [TB] ABDELRAHMAN Hilliard         Diagnoses as of 05/23/24 1108   SVT (supraventricular tachycardia) (CMS-[facility], located within [facility])   History of noncompliance with medical treatment   Cigarette nicotine dependence with nicotine-induced disorder         This patient has remained hemodynamically stable during their ED course.      Critical Care: please see attending note for critical care time      Counseling:  The emergency provider has spoken with the patient and discussed today’s results, in addition to providing specific details for the plan of care and counseling regarding the diagnosis and prognosis.  Questions are answered at this time and they are agreeable with the plan.         --------------------------------- IMPRESSION AND DISPOSITION ---------------------------------    IMPRESSION  1. SVT (supraventricular tachycardia) (CMS-[facility], located within [facility])        DISPOSITION  Disposition: Admit ICU level of care  Patient condition is critical        NOTE: This report was transcribed using voice recognition software. Every effort was made to ensure accuracy; however, inadvertent computerized transcription errors may be present      ABDELRAHMAN Hilliard  05/21/24 4693       ABDELRAHMAN Hilliard  05/23/24 2177

## 2024-05-21 NOTE — H&P
Critical Care Medicine       Date:  5/21/2024  Patient:  Los Rubio  YOB: 1970  MRN:  [id number]   Admit Date:  5/21/2024    Chief Complaint   Patient presents with    Rapid Heart Rate       History of Present Illness:  Los Rubio is a 53 y.o. year old male patient with Past Medical History of asthma and HTN.  The patient states he has been prescribed medication for HTN but stopped taking them about 3 months ago. He presented to ED today after waking up around 3am with heart palpitations.  Associated symptoms were nausea and weakness. Initial heart rate was 204.  EKG showed SVT. Labs remarkable for WBC 15.5, glucose 180, bicarb 23, ALT/AST 50/86.  Patient was given 1 liter fluid bolus, adenosine x's 2 and cardioverted x's 2 without success.  ED provider spoke with cardiology who recommended amiodarone bolus and gtt.     Interval ICU Events:  5/21: Admitted to ICU still in SVT.  BP stable.  Given 5mg IV metoprolol x's 1.  Converted to NSR.      Medical History:  Past Medical History:   Diagnosis Date    Hypertension      History reviewed. No pertinent surgical history.  Medications Prior to Admission   Medication Sig Dispense Refill Last Dose    albuterol 90 mcg/actuation inhaler Inhale 1 puff every 6 hours if needed for wheezing or shortness of breath.        Patient has no known allergies.  Social History     Tobacco Use    Smoking status: Unknown   Substance Use Topics    Drug use: Not Currently     No family history on file.    Hospital Medications:    amiodarone, 1 mg/min, Last Rate: 1 mg/min (05/21/24 0714)          Current Facility-Administered Medications:     amiodarone (Nexterone) 360 mg in dextrose,iso-osm 200 mL (1.8 mg/mL) infusion (premix), 1 mg/min, intravenous, Continuous, ABDELRAHMAN Underwood, Last Rate: 33.3 mL/hr at 05/21/24 0714, 1 mg/min at 05/21/24 0714    enoxaparin (Lovenox) syringe 40 mg, 40 mg, subcutaneous, q24h ETHAN, ABDELRAHMAN Underwood    oxygen (O2)  "therapy, , inhalation, Continuous - Inhalation, Judy Boo, APRN-CNP, 2 L/min at 05/21/24 0800    Review of Systems:  14 point review of systems was completed and negative except for those specially mention in my HPI    Physical Exam:    Heart Rate:  [147-206]   Temp:  [36.2 °C (97.2 °F)-36.8 °C (98.2 °F)]   Resp:  [16-21]   BP: ()/()   Height:  [180.3 cm (5' 11\")]   Weight:  [74.1 kg (163 lb 5.8 oz)-76 kg (167 lb 8.8 oz)]   SpO2:  [95 %-100 %]     Physical Exam  Constitutional:       General: He is awake. He is not in acute distress.     Appearance: He is well-developed.   HENT:      Head: Normocephalic and atraumatic.   Eyes:      Extraocular Movements: Extraocular movements intact.      Conjunctiva/sclera: Conjunctivae normal.   Cardiovascular:      Rate and Rhythm: Normal rate and regular rhythm.      Pulses:           Radial pulses are 2+ on the right side and 2+ on the left side.        Dorsalis pedis pulses are 2+ on the right side and 2+ on the left side.   Pulmonary:      Breath sounds: Examination of the right-upper field reveals wheezing. Examination of the left-upper field reveals wheezing. Decreased breath sounds and wheezing present.   Abdominal:      General: Abdomen is flat. Bowel sounds are normal.      Palpations: Abdomen is soft.   Musculoskeletal:      Cervical back: Normal range of motion and neck supple.      Comments: 5/5 strength to all extremities   Skin:     General: Skin is warm and dry.      Capillary Refill: Capillary refill takes less than 2 seconds.   Neurological:      Mental Status: He is alert and oriented to person, place, and time.      GCS: GCS eye subscore is 4. GCS verbal subscore is 5. GCS motor subscore is 6.      Cranial Nerves: Cranial nerves 2-12 are intact.   Psychiatric:         Attention and Perception: Attention normal.         Mood and Affect: Mood normal.         Behavior: Behavior is cooperative.         Objective:    I have reviewed all " medications, laboratory results, and imaging pertinent for today's encounter.    FiO2 (%):  [28 %] 28 %    No intake or output data in the 24 hours ending 05/21/24 1033    Results for orders placed or performed during the hospital encounter of 05/21/24 (from the past 24 hour(s))   CBC and Auto Differential   Result Value Ref Range    WBC 15.5 (H) 4.4 - 11.3 x10*3/uL    nRBC 0.0 0.0 - 0.0 /100 WBCs    RBC 5.64 4.50 - 5.90 x10*6/uL    Hemoglobin 15.0 13.5 - 17.5 g/dL    Hematocrit 45.4 41.0 - 52.0 %    MCV 81 80 - 100 fL    MCH 26.6 26.0 - 34.0 pg    MCHC 33.0 32.0 - 36.0 g/dL    RDW 18.5 (H) 11.5 - 14.5 %    Platelets 408 150 - 450 x10*3/uL    Neutrophils % 64.1 40.0 - 80.0 %    Immature Granulocytes %, Automated 0.5 0.0 - 0.9 %    Lymphocytes % 27.3 13.0 - 44.0 %    Monocytes % 5.7 2.0 - 10.0 %    Eosinophils % 1.6 0.0 - 6.0 %    Basophils % 0.8 0.0 - 2.0 %    Neutrophils Absolute 9.90 (H) 1.20 - 7.70 x10*3/uL    Immature Granulocytes Absolute, Automated 0.08 0.00 - 0.70 x10*3/uL    Lymphocytes Absolute 4.22 1.20 - 4.80 x10*3/uL    Monocytes Absolute 0.88 0.10 - 1.00 x10*3/uL    Eosinophils Absolute 0.25 0.00 - 0.70 x10*3/uL    Basophils Absolute 0.12 (H) 0.00 - 0.10 x10*3/uL   Comprehensive metabolic panel   Result Value Ref Range    Glucose 180 (H) 65 - 99 mg/dL    Sodium 138 133 - 145 mmol/L    Potassium 4.0 3.4 - 5.1 mmol/L    Chloride 102 97 - 107 mmol/L    Bicarbonate 23 (L) 24 - 31 mmol/L    Urea Nitrogen 20 8 - 25 mg/dL    Creatinine 1.10 0.40 - 1.60 mg/dL    eGFR 80 >60 mL/min/1.73m*2    Calcium 9.1 8.5 - 10.4 mg/dL    Albumin 4.0 3.5 - 5.0 g/dL    Alkaline Phosphatase 90 35 - 125 U/L    Total Protein 6.8 5.9 - 7.9 g/dL    AST 86 (H) 5 - 40 U/L    Bilirubin, Total 0.3 0.1 - 1.2 mg/dL    ALT 50 (H) 5 - 40 U/L    Anion Gap 13 <=19 mmol/L   TSH with reflex to Free T4 if abnormal   Result Value Ref Range    Thyroid Stimulating Hormone 2.62 0.27 - 4.20 mIU/L   NT Pro-BNP   Result Value Ref Range    PROBNP 582  (H) 0 - 138 pg/mL   Serial Troponin, Initial ([facility])   Result Value Ref Range    Troponin T, High Sensitivity 22 (HH) <=14 ng/L   Serial Troponin, 2 Hour ([facility])   Result Value Ref Range    Troponin T, High Sensitivity 35 ([facility]) <=14 ng/L       Assessment/Plan:    I am currently managing this critically ill patient for the following problems:    Neuro/Psych/Pain Ctrl/Sedation:  #ETOH abuse  #Marijuana use  -Patient states he drinks 2 beers/day   -Neuro checks per protocol  -Patient is A&Ox's 3  -Urine drug screen ordered  -CIWA without medication for now  -Monitor CAM-ICU    Respiratory/ENT:  #Asthma  #Nicotine dependence   -Patient smokes 1 ppd  -CXR no acute process  -Currently on RA  -Continuous pulse oximetry, maintain SPO2>92%  -Aggressive pulmonary toileting/bronchial hygiene  -Encourage IS  -Nicotine patch ordered  -Duonebs Q6  -OOB to chair    Cardiovascular:  #SVT  #Hx HTN  -ECHO 6/2020:  Normal left ventricular systolic function. Estimated ejection  fraction is about 55-60%. Mild TR. Mild left ventricular hypertrophy seen  -ProBNP on admission 582  -Continuous cardiac monitoring  -Maintain goal MAP>65  -EP consulted, appreciate recs  -Continue amiodarone gtt  -ECHO ordered  -Monitor and optimize electrolytes, keep K>4, Mg>2    GI:  #Transaminitis  -Diet: NPO until seen by cardiology  -Daily CMP  -GI prophylaxis: Not indicated  -Bowel regimen: none    Renal/Volume Status (Intra & Extravascular):  -Renal function: 20/1.10  -Daily RFP, Mg, Phos  -Replace electrolytes PRN    Endocrine  #Hyperglycemia  -Monitor blood glucose Q4, add SSI if patient remains >180  -Goal BS<180  -Hypoglycemic protocol  -HA1C pending  -TSH WNL      Infectious Disease:  #Leukocytosis  -Afebrile  -WBC 15.5  -UA ordered    Heme/Onc:  -HH: WNL  -Daily CBC  -Check coags  -VTE prophylaxis: lovenox    Derm/MSK:  -ICU skin protocol    Ethics/Code Status:  -Full code    Lines/Larson/Restranits:  CVC: no  Camila: no  Larson: no  Restraints:  no    Dispo: ICU    Plan discussed with: Dr Brink  Critical Care Time:  70 minutes      MAEGAN Underwood-CNP  Pulmonary and Critical Care Medicine

## 2024-05-21 NOTE — CONSULTS
"Inpatient consult to Cardiology  Consult performed by: Kelly Lucia MD  Consult ordered by: Judy Boo, MAEGAN-CNP  Reason for consult: SVT        History Of Present Illness:    [place] is a 53 y.o. male with PMH of asthma and HTN. He presented to ED today with SVT with HR around 200 bpm after waking up around 3AM with palpitations and fatigue. In the ED, patient was given 1 liter fluid bolus, adenosine x2 and cardioverted x2 without success. Amiodarone bolus and gtt were given. He was transferred to the ICU still in SVT. In the ICU, the patient received metoprolol IV. His SVT terminated at 10:39 AM.   Patient states that this was his second episode.  Drug screen is negative, K 4, Mg 2.2, TSH 2.6.      Last Recorded Vitals:  Vitals:    05/21/24 1200 05/21/24 1300 05/21/24 1400 05/21/24 1500   BP: (!) 134/100 (!) 136/104 (!) 149/102 (!) 160/118   BP Location:       Patient Position:       Pulse: 64 61 66 64   Resp: 16 13 15 21   Temp:       TempSrc:       SpO2: 99% 99% 100% 99%   Weight:       Height:           Last Labs:  CBC - 5/21/2024:  5:26 AM  15.5 15.0 408    45.4      CMP - 5/21/2024:  5:26 AM  9.1 6.8 86 --- 0.3   3.7 4.0 50 90      PTT - No results in last year.  _   _ _     Hemoglobin A1C   Date/Time Value Ref Range Status   05/21/2024 05:26 AM 5.7 (H) See below % Final     LDL Calculated   Date/Time Value Ref Range Status   06/25/2020 07:53 PM 84 65 - 130 MG/DL Final      Last I/O:  No intake/output data recorded.    Past Cardiology Tests (Last 3 Years):  EKG:  No results found for this or any previous visit from the past 1095 days.    Echo:  No results found for this or any previous visit from the past 1095 days.    Ejection Fractions:  No results found for: \"EF\"  Cath:  No results found for this or any previous visit from the past 1095 days.    Stress Test:  No results found for this or any previous visit from the past 1095 days.    Cardiac Imaging:  No results found for this or any " previous visit from the past 1095 days.      Past Medical History:  He has a past medical history of Hypertension.    Past Surgical History:  He has no past surgical history on file.      Social History:  He reports that he has been smoking cigarettes. He started smoking about 34 years ago. He has a 68.8 pack-year smoking history. He does not have any smokeless tobacco history on file. He reports that he does not currently use drugs. No history on file for alcohol use.    Family History:  No family history on file.     Allergies:  Shellfish derived    Inpatient Medications:  Scheduled medications   Medication Dose Route Frequency    enoxaparin  40 mg subcutaneous q24h ETHAN    folic acid  1 mg oral Daily    ipratropium-albuteroL  3 mL nebulization q6h    metoprolol tartrate  12.5 mg oral BID    multivitamin with minerals  1 tablet oral Daily    nicotine  1 patch transdermal Daily    Followed by    [START ON 7/2/2024] nicotine  1 patch transdermal Daily    Followed by    [START ON 7/16/2024] nicotine  1 patch transdermal Daily    oxygen   inhalation Continuous - Inhalation    thiamine  100 mg oral Daily     PRN medications   Medication    dextrose    dextrose    glucagon    glucagon     Continuous Medications   Medication Dose Last Rate     Outpatient Medications:  Current Outpatient Medications   Medication Instructions    albuterol 90 mcg/actuation inhaler 1 puff, inhalation, Every 6 hours PRN       Review of Systems   Constitutional:  Negative for fever.   Respiratory:  Positive for shortness of breath.    Cardiovascular:  Positive for palpitations. Negative for chest pain and leg swelling.        As per history.   Neurological:  Negative for dizziness and syncope.   Psychiatric/Behavioral:  Negative for confusion.       Physical Exam  Constitutional:       Appearance: Normal appearance.   Cardiovascular:      Rate and Rhythm: Normal rate and regular rhythm.      Heart sounds: No murmur heard.     No friction rub.  No gallop.   Pulmonary:      Effort: Pulmonary effort is normal.      Breath sounds: Normal breath sounds.   Abdominal:      Palpations: Abdomen is soft.   Musculoskeletal:      Cervical back: Neck supple.   Neurological:      Mental Status: He is alert.   Psychiatric:         Mood and Affect: Mood normal.         Behavior: Behavior normal.      Echocardiogram (6/27/2020)  Findings      Procedural Information: The study quality is technically difficult.      Left Ventricle: Borderline concentric left ventricular hypertrophy is  observed. There is normal left ventricular    systolic function. The  estimated ejection fraction is 55-60%.      Left Atrium:  The left atrial size is upper limits of normal.      Right Ventricle: The right ventricular cavity size is normal. The  right ventricular global systolic function is    normal.      Right Atrium: The right atrial cavity size is normal.      Aortic Valve: Mild aortic cusp sclerosis is present. Systolic  excursion of the aortic valve is normal.      Mitral Valve: Mitral valve leaflet mobility appears normal.      Tricuspid Valve: Tricuspid valve leaflet mobility appears normal.  RVSP is 24 mm Hg+RA pressure.      Pericardium: There is no pericardial effusion.      Aorta:  The aortic root is upper limits of normal in size.         Summary     Normal left ventricular systolic function. Estimated ejection  fraction is about 55-60%. Mild TR.     Mild left ventricular hypertrophy seen     Assessment/Plan     Patient admitted to the ICU with SVT with difficult conversion to sinus rhythm.  I explained to the patient about his arrhythmia and treatment options, including medical therapy and catheter ablation. The patient prefers not to have a catheter ablation at this time, electing to try medical therapy.   I agree with the initiation metoprolol tartrate 12.5 mg bid. At this point, patient remains in sinus rhythm with HR ~ 50s. Patient can follow up with me as outpatient.    Please scan the ECGs with showing SVT to EPIC.     Peripheral IV 05/21/24 20 G Left Antecubital (Active)   Site Assessment Clean;Dry;Intact 05/21/24 1100   Dressing Type Transparent 05/21/24 1100   Line Status Blood return noted;Flushed;Saline locked 05/21/24 1100   Dressing Status Clean;Dry;Occlusive 05/21/24 1100   Number of days: 0       Peripheral IV 05/21/24 18 G Distal;Right;Upper Arm (Active)   Site Assessment Clean;Dry;Intact 05/21/24 1100   Dressing Type Transparent 05/21/24 1100   Line Status Blood return noted;Flushed;Infusing 05/21/24 1100   Dressing Status Clean;Dry;Occlusive 05/21/24 1100   Number of days: 0       Code Status:  Full Code          Kelly Hollingsworth MD

## 2024-05-21 NOTE — CARE PLAN
RT called to ED #13; patient in SVT hr 192; adenosine given without decreasing hr; Patient cardioverted x 2 without decreasing hr 192-202. Respirations maintained in 20's with good chest rise and no distress SpO2 100% on 2L nasal cannula.

## 2024-05-21 NOTE — ED PROVIDER NOTES
HPI   Chief Complaint   Patient presents with    Rapid Heart Rate       THIS IS MY [facility] SUPERVISORY AND SHARED VISIT NOTE:    I personally saw the patient and made/approved a substantive portion of the management plan for the visit including all aspects of the medical decision making. All diagnostic, treatment, and disposition decisions were made by me in conjunction with the K[facility] as noted in the medical record. I take responsibility for the patient management.    53-year-old male with a history of SVT comes to the emergency department with a chief complaint of palpitations.  Patient has been noncompliant with his medications for 4 months.  Please see the nurse practitioner's notes for further details        History provided by:  Patient   used: No                        [place] Coma Scale Score: 15                     Patient History   Past Medical History:   Diagnosis Date    Hypertension      History reviewed. No pertinent surgical history.  No family history on file.  Social History     Tobacco Use    Smoking status: Every Day     Current packs/day: 2.00     Average packs/day: 2.0 packs/day for 34.4 years (68.8 ttl pk-yrs)     Types: Cigarettes     Start date: 1990    Smokeless tobacco: Not on file   Substance Use Topics    Alcohol use: Not on file    Drug use: Not Currently       Physical Exam   ED Triage Vitals [05/21/24 0449]   Temperature Heart Rate Respirations BP   36.2 °C (97.2 °F) (!) 206 16 (!) 114/95      Pulse Ox Temp Source Heart Rate Source Patient Position   99 % Temporal Monitor --      BP Location FiO2 (%)     -- --       Physical Exam  Vitals and nursing note reviewed.   Constitutional:       Appearance: He is well-developed and normal weight. He is ill-appearing and diaphoretic.   HENT:      Head: Normocephalic and atraumatic.   Eyes:      Conjunctiva/sclera: Conjunctivae normal.   Cardiovascular:      Rate and Rhythm: Regular rhythm. Tachycardia present.      Heart sounds: No  murmur heard.  Pulmonary:      Effort: Pulmonary effort is normal. No respiratory distress.      Breath sounds: Normal breath sounds.   Abdominal:      Palpations: Abdomen is soft.      Tenderness: There is no abdominal tenderness.   Musculoskeletal:         General: No swelling.      Cervical back: Neck supple.   Skin:     General: Skin is warm.      Capillary Refill: Capillary refill takes less than 2 seconds.   Neurological:      General: No focal deficit present.      Mental Status: He is alert.   Psychiatric:         Mood and Affect: Mood normal.         ED Course & MDM   ED Course as of 05/23/24 0916   Tue May 21, 2024   0834 Discussed case with Dr. Brink and Team via secure chat.  Has agreed to take the patient under his service ICU level of care for further evaluation and treatment [TB]   Thu May 23, 2024   0902 ECG performed on May 21, 2024 at 5:34 AM and interpreted by me at 5:35 AM showing a supraventricular tachycardia with a ventricular rate of 193, no previous for comparison.  No STEMI.  No axis deviation.  Otherwise intervals within normal limits. [EG]      ED Course User Index  [EG] Denisse Lam MD  [TB] Barbara Ramsey, MAEGAN-CNP         Diagnoses as of 05/23/24 0916   SVT (supraventricular tachycardia) (CMS-[facility])   History of noncompliance with medical treatment   Cigarette nicotine dependence with nicotine-induced disorder       Medical Decision Making  Patient arrived in the emergency department and had a pulse in the 190s and 200s and was immediately evaluated with an ECG showing supraventricular tachycardia.  Initial attempts at modified Valsalva with Nam patient effort were unsuccessful.  He was then treated with adenosine per ACLS protocol with 6 mg followed by 12 mg.  This too was unsuccessful and he was diaphoretic and dizzy which is symptomatic of the tachycardia.  He did not develop chest pain.  He was given medications for sedation including fentanyl and Versed prior to  attempts at synchronized cardioversion.  He was given a dose of 100 J followed by 150 J again with no improvement.  Calls were made to the cardiologist on-call with messages left.  These were not returned.  A call to the electrophysiologist's office Dr. Williamson.  Dr. Munguia who is covering for Dr. Williamson returned the page.  His recommendation was to give amiodarone.  He was treated with the amiodarone and excepted to the ICU.    Amount and/or Complexity of Data Reviewed  Labs: ordered. Decision-making details documented in ED Course.  Radiology: ordered and independent interpretation performed. Decision-making details documented in ED Course.  ECG/medicine tests: ordered and independent interpretation performed. Decision-making details documented in ED Course.        Procedure  Critical Care    Performed by: Denisse Lam MD  Authorized by: Denisse Lam MD    Critical care provider statement:     Critical care time (minutes):  45    Critical care time was exclusive of:  Separately billable procedures and treating other patients    Critical care was necessary to treat or prevent imminent or life-threatening deterioration of the following conditions:  Circulatory failure    Critical care was time spent personally by me on the following activities:  Development of treatment plan with patient or surrogate, discussions with consultants, evaluation of patient's response to treatment, examination of patient, obtaining history from patient or surrogate, ordering and performing treatments and interventions, ordering and review of laboratory studies, ordering and review of radiographic studies, pulse oximetry and re-evaluation of patient's condition  Moderate Sedation    Performed by: Denisse Lam MD  Authorized by: Denisse Lam MD    Consent:     Consent obtained:  Verbal    Consent given by:  Patient    Risks, benefits, and alternatives were discussed: yes      Risks  discussed:  Allergic reaction, prolonged hypoxia resulting in organ damage, prolonged sedation necessitating reversal, dysrhythmia, inadequate sedation, respiratory compromise necessitating ventilatory assistance and intubation, vomiting and nausea    Alternatives discussed:  Anxiolysis  Universal protocol:     Patient identity confirmed:  Verbally with patient, hospital-assigned identification number and arm band  Indications:     Procedure performed:  Cardioversion    Procedure necessitating sedation performed by:  Physician performing sedation    Intended level of sedation:  Moderate  Pre-sedation assessment:     NPO status caution: urgency dictates proceeding with non-ideal NPO status      ASA classification: class 2 - patient with mild systemic disease      Mouth opening:  3 or more finger widths    Thyromental distance:  4 finger widths    Mallampati score:  I - soft palate, uvula, fauces, pillars visible    Neck mobility: normal      Pre-sedation assessments completed and reviewed: airway patency      History of difficult intubation: no    Immediate pre-procedure details:     Reassessment: Patient reassessed immediately prior to procedure      Reviewed: vital signs      Verified: bag valve mask available, emergency equipment available, intubation equipment available, IV patency confirmed, oxygen available, reversal medications available and suction available    Procedure details (see MAR for exact dosages):     Preoxygenation:  Nasal cannula    Sedation:  Midazolam    Analgesia:  Fentanyl    Intra-procedure monitoring:  Blood pressure monitoring, cardiac monitor, continuous pulse oximetry, frequent vital sign checks and frequent LOC assessments    Intra-procedure events: none      Total sedation time (minutes):  30  Post-procedure details:     Attendance: Constant attendance by certified staff until patient recovered      Recovery: Patient returned to pre-procedure baseline      Estimated blood loss (see I/O  flowsheets): no      Post-sedation assessments completed and reviewed: airway patency, mental status and respiratory function      Specimens recovered:  None    Patient is stable for discharge or admission: yes      Procedure completion:  Tolerated       Denisse Lam MD  05/23/24 0991

## 2024-05-21 NOTE — CARE PLAN
Problem: Pain - Adult  Goal: Verbalizes/displays adequate comfort level or baseline comfort level  Outcome: Progressing     Problem: Safety - Adult  Goal: Free from fall injury  Outcome: Progressing     Problem: Discharge Planning  Goal: Discharge to home or other facility with appropriate resources  Outcome: Progressing     Problem: Chronic Conditions and Co-morbidities  Goal: Patient's chronic conditions and co-morbidity symptoms are monitored and maintained or improved  Outcome: Progressing   The patient's goals for the shift include normal BP and HR    The clinical goals for the shift include control and stabilize BP and HR    Over the shift, the patient did not make progress toward the following goal  Problem: Pain - Adult  Goal: Verbalizes/displays adequate comfort level or baseline comfort level  Outcome: Progressing     Problem: Safety - Adult  Goal: Free from fall injury  Outcome: Progressing     Problem: Discharge Planning  Goal: Discharge to home or other facility with appropriate resources  Outcome: Progressing     Problem: Chronic Conditions and Co-morbidities  Goal: Patient's chronic conditions and co-morbidity symptoms are monitored and maintained or improved  Outcome: Progressing

## 2024-05-21 NOTE — ED NOTES
Pt presents ambulatory through triage with concern for HTN. Pt states he woke up feeling nauseas and weak. Pt states he has been off of his BP mds for approx 3 months. Pt's  during triage. EKG done immediately. Pt taken back to a room immediately and placed on the monitor. Patient was given Adenosine and cardioversion without success.  Amiodarone initiated    PMHX:  Past Medical History:   Diagnosis Date    Hypertension         No Known Allergies      LABS:   Latest Reference Range & Units 05/21/24 05:26   GLUCOSE 65 - 99 mg/dL 180 (H)   SODIUM 133 - 145 mmol/L 138   POTASSIUM 3.4 - 5.1 mmol/L 4.0   CHLORIDE 97 - 107 mmol/L 102   Bicarbonate 24 - 31 mmol/L 23 (L)   Anion Gap <=19 mmol/L 13   Blood Urea Nitrogen 8 - 25 mg/dL 20   Creatinine 0.40 - 1.60 mg/dL 1.10   EGFR >60 mL/min/1.73m*2 80   Calcium 8.5 - 10.4 mg/dL 9.1   Albumin 3.5 - 5.0 g/dL 4.0   Alkaline Phosphatase 35 - 125 U/L 90   ALT 5 - 40 U/L 50 (H)   AST 5 - 40 U/L 86 (H)   Bilirubin Total 0.1 - 1.2 mg/dL 0.3   Total Protein 5.9 - 7.9 g/dL 6.8   Thyroid Stimulating Hormone 0.27 - 4.20 mIU/L 2.62   LEUKOCYTES (10*3/UL) IN BLOOD BY AUTOMATED COUNT, Spanish 4.4 - 11.3 x10*3/uL 15.5 (H)   nRBC 0.0 - 0.0 /100 WBCs 0.0   ERYTHROCYTES (10*6/UL) IN BLOOD BY AUTOMATED COUNT, Spanish 4.50 - 5.90 x10*6/uL 5.64   HEMOGLOBIN 13.5 - 17.5 g/dL 15.0   HEMATOCRIT 41.0 - 52.0 % 45.4   MCV 80 - 100 fL 81   MCH 26.0 - 34.0 pg 26.6   MCHC 32.0 - 36.0 g/dL 33.0   RED CELL DISTRIBUTION WIDTH 11.5 - 14.5 % 18.5 (H)   PLATELETS (10*3/UL) IN BLOOD AUTOMATED COUNT, Spanish 150 - 450 x10*3/uL 408   (H): Data is abnormally high  (L): Data is abnormally low   Latest Reference Range & Units 05/21/24 05:26   PROBNP 0 - 138 pg/mL 582 (H)   Troponin T, High Sensitivity <=14 ng/L 22 (HH)   (HH): Data is critically high  (H): Data is abnormally high      PLAN: Pending                   Maria R Diamond RN  05/21/24 6048

## 2024-05-22 LAB
ALBUMIN SERPL-MCNC: 3.9 G/DL (ref 3.5–5)
ALP BLD-CCNC: 79 U/L (ref 35–125)
ALT SERPL-CCNC: 47 U/L (ref 5–40)
ANION GAP SERPL CALC-SCNC: 10 MMOL/L
AORTIC VALVE MEAN GRADIENT: 1.7 MMHG
AORTIC VALVE PEAK VELOCITY: 0.88 M/S
APTT PPP: 26.5 SECONDS (ref 22–32.5)
AST SERPL-CCNC: 32 U/L (ref 5–40)
AV PEAK GRADIENT: 3.1 MMHG
AVA (PEAK VEL): 3.47 CM2
AVA (VTI): 3.51 CM2
BASOPHILS # BLD AUTO: 0.1 X10*3/UL (ref 0–0.1)
BASOPHILS NFR BLD AUTO: 0.7 %
BILIRUB SERPL-MCNC: 0.4 MG/DL (ref 0.1–1.2)
BUN SERPL-MCNC: 13 MG/DL (ref 8–25)
CALCIUM SERPL-MCNC: 8.8 MG/DL (ref 8.5–10.4)
CHLORIDE SERPL-SCNC: 102 MMOL/L (ref 97–107)
CO2 SERPL-SCNC: 24 MMOL/L (ref 24–31)
CREAT SERPL-MCNC: 0.9 MG/DL (ref 0.4–1.6)
EGFRCR SERPLBLD CKD-EPI 2021: >90 ML/MIN/1.73M*2
EJECTION FRACTION APICAL 4 CHAMBER: 28.7
EOSINOPHIL # BLD AUTO: 0.18 X10*3/UL (ref 0–0.7)
EOSINOPHIL NFR BLD AUTO: 1.3 %
ERYTHROCYTE [DISTWIDTH] IN BLOOD BY AUTOMATED COUNT: 17.6 % (ref 11.5–14.5)
GLUCOSE BLD MANUAL STRIP-MCNC: 102 MG/DL (ref 74–99)
GLUCOSE BLD MANUAL STRIP-MCNC: 114 MG/DL (ref 74–99)
GLUCOSE BLD MANUAL STRIP-MCNC: 121 MG/DL (ref 74–99)
GLUCOSE SERPL-MCNC: 102 MG/DL (ref 65–99)
HCT VFR BLD AUTO: 39.8 % (ref 41–52)
HGB BLD-MCNC: 13.4 G/DL (ref 13.5–17.5)
HOLD SPECIMEN: NORMAL
IMM GRANULOCYTES # BLD AUTO: 0.05 X10*3/UL (ref 0–0.7)
IMM GRANULOCYTES NFR BLD AUTO: 0.3 % (ref 0–0.9)
INR PPP: 1 (ref 0.9–1.2)
LEFT ATRIUM VOLUME AREA LENGTH INDEX BSA: 37.4 ML/M2
LEFT VENTRICLE INTERNAL DIMENSION DIASTOLE: 4.93 CM (ref 3.5–6)
LEFT VENTRICULAR OUTFLOW TRACT DIAMETER: 2.14 CM
LV EJECTION FRACTION BIPLANE: 33 %
LYMPHOCYTES # BLD AUTO: 3.3 X10*3/UL (ref 1.2–4.8)
LYMPHOCYTES NFR BLD AUTO: 23 %
MAGNESIUM SERPL-MCNC: 1.9 MG/DL (ref 1.6–3.1)
MCH RBC QN AUTO: 26.7 PG (ref 26–34)
MCHC RBC AUTO-ENTMCNC: 33.7 G/DL (ref 32–36)
MCV RBC AUTO: 79 FL (ref 80–100)
MITRAL VALVE E/A RATIO: 1.09
MITRAL VALVE E/E' RATIO: 7.15
MONOCYTES # BLD AUTO: 0.96 X10*3/UL (ref 0.1–1)
MONOCYTES NFR BLD AUTO: 6.7 %
NEUTROPHILS # BLD AUTO: 9.78 X10*3/UL (ref 1.2–7.7)
NEUTROPHILS NFR BLD AUTO: 68 %
NRBC BLD-RTO: 0 /100 WBCS (ref 0–0)
PHOSPHATE SERPL-MCNC: 2.8 MG/DL (ref 2.5–4.5)
PLATELET # BLD AUTO: 393 X10*3/UL (ref 150–450)
POTASSIUM SERPL-SCNC: 3.9 MMOL/L (ref 3.4–5.1)
PROT SERPL-MCNC: 6.2 G/DL (ref 5.9–7.9)
PROTHROMBIN TIME: 10.8 SECONDS (ref 9.3–12.7)
RBC # BLD AUTO: 5.02 X10*6/UL (ref 4.5–5.9)
RIGHT VENTRICLE FREE WALL PEAK S': 8.1 CM/S
RIGHT VENTRICLE PEAK SYSTOLIC PRESSURE: 38.9 MMHG
SODIUM SERPL-SCNC: 136 MMOL/L (ref 133–145)
WBC # BLD AUTO: 14.4 X10*3/UL (ref 4.4–11.3)

## 2024-05-22 PROCEDURE — 94640 AIRWAY INHALATION TREATMENT: CPT

## 2024-05-22 PROCEDURE — 2500000006 HC RX 250 W HCPCS SELF ADMINISTERED DRUGS (ALT 637 FOR ALL PAYERS)

## 2024-05-22 PROCEDURE — 36415 COLL VENOUS BLD VENIPUNCTURE: CPT

## 2024-05-22 PROCEDURE — 2020000001 HC ICU ROOM DAILY

## 2024-05-22 PROCEDURE — 82947 ASSAY GLUCOSE BLOOD QUANT: CPT

## 2024-05-22 PROCEDURE — 99291 CRITICAL CARE FIRST HOUR: CPT

## 2024-05-22 PROCEDURE — 2500000004 HC RX 250 GENERAL PHARMACY W/ HCPCS (ALT 636 FOR OP/ED)

## 2024-05-22 PROCEDURE — 9420000001 HC RT PATIENT EDUCATION 5 MIN

## 2024-05-22 PROCEDURE — 2500000005 HC RX 250 GENERAL PHARMACY W/O HCPCS

## 2024-05-22 PROCEDURE — 85610 PROTHROMBIN TIME: CPT

## 2024-05-22 PROCEDURE — 80053 COMPREHEN METABOLIC PANEL: CPT

## 2024-05-22 PROCEDURE — 84100 ASSAY OF PHOSPHORUS: CPT

## 2024-05-22 PROCEDURE — 85025 COMPLETE CBC W/AUTO DIFF WBC: CPT

## 2024-05-22 PROCEDURE — 2500000001 HC RX 250 WO HCPCS SELF ADMINISTERED DRUGS (ALT 637 FOR MEDICARE OP)

## 2024-05-22 PROCEDURE — 83735 ASSAY OF MAGNESIUM: CPT

## 2024-05-22 PROCEDURE — S4991 NICOTINE PATCH NONLEGEND: HCPCS

## 2024-05-22 PROCEDURE — 2500000002 HC RX 250 W HCPCS SELF ADMINISTERED DRUGS (ALT 637 FOR MEDICARE OP, ALT 636 FOR OP/ED)

## 2024-05-22 RX ORDER — HYDROCHLOROTHIAZIDE 25 MG/1
25 TABLET ORAL DAILY
Status: DISCONTINUED | OUTPATIENT
Start: 2024-05-22 | End: 2024-05-23

## 2024-05-22 RX ORDER — AMLODIPINE BESYLATE 5 MG/1
5 TABLET ORAL DAILY
Status: DISCONTINUED | OUTPATIENT
Start: 2024-05-22 | End: 2024-05-22

## 2024-05-22 RX ORDER — MAGNESIUM SULFATE 1 G/100ML
1 INJECTION INTRAVENOUS ONCE
Status: COMPLETED | OUTPATIENT
Start: 2024-05-22 | End: 2024-05-22

## 2024-05-22 RX ORDER — ACETAMINOPHEN 325 MG/1
650 TABLET ORAL EVERY 6 HOURS PRN
Status: DISCONTINUED | OUTPATIENT
Start: 2024-05-22 | End: 2024-05-24 | Stop reason: HOSPADM

## 2024-05-22 RX ORDER — LABETALOL HYDROCHLORIDE 5 MG/ML
10 INJECTION, SOLUTION INTRAVENOUS ONCE
Status: COMPLETED | OUTPATIENT
Start: 2024-05-22 | End: 2024-05-22

## 2024-05-22 RX ORDER — LABETALOL HYDROCHLORIDE 5 MG/ML
10 INJECTION, SOLUTION INTRAVENOUS EVERY 4 HOURS PRN
Status: DISCONTINUED | OUTPATIENT
Start: 2024-05-22 | End: 2024-05-24 | Stop reason: HOSPADM

## 2024-05-22 RX ORDER — ONDANSETRON 4 MG/1
4 TABLET, ORALLY DISINTEGRATING ORAL EVERY 8 HOURS PRN
Status: DISCONTINUED | OUTPATIENT
Start: 2024-05-22 | End: 2024-05-24 | Stop reason: HOSPADM

## 2024-05-22 RX ORDER — ONDANSETRON HYDROCHLORIDE 2 MG/ML
4 INJECTION, SOLUTION INTRAVENOUS EVERY 8 HOURS PRN
Status: DISCONTINUED | OUTPATIENT
Start: 2024-05-22 | End: 2024-05-24 | Stop reason: HOSPADM

## 2024-05-22 RX ORDER — LABETALOL HYDROCHLORIDE 5 MG/ML
10 INJECTION, SOLUTION INTRAVENOUS EVERY 4 HOURS PRN
Status: DISCONTINUED | OUTPATIENT
Start: 2024-05-22 | End: 2024-05-22

## 2024-05-22 RX ORDER — POTASSIUM CHLORIDE 20 MEQ/1
20 TABLET, EXTENDED RELEASE ORAL ONCE
Status: COMPLETED | OUTPATIENT
Start: 2024-05-22 | End: 2024-05-22

## 2024-05-22 RX ORDER — HYDRALAZINE HYDROCHLORIDE 20 MG/ML
10 INJECTION INTRAMUSCULAR; INTRAVENOUS EVERY 4 HOURS PRN
Status: DISCONTINUED | OUTPATIENT
Start: 2024-05-22 | End: 2024-05-24 | Stop reason: HOSPADM

## 2024-05-22 RX ORDER — IPRATROPIUM BROMIDE AND ALBUTEROL SULFATE 2.5; .5 MG/3ML; MG/3ML
3 SOLUTION RESPIRATORY (INHALATION) EVERY 6 HOURS PRN
Status: DISCONTINUED | OUTPATIENT
Start: 2024-05-22 | End: 2024-05-24 | Stop reason: HOSPADM

## 2024-05-22 RX ADMIN — IPRATROPIUM BROMIDE AND ALBUTEROL SULFATE 3 ML: 2.5; .5 SOLUTION RESPIRATORY (INHALATION) at 19:06

## 2024-05-22 RX ADMIN — ONDANSETRON 4 MG: 2 INJECTION INTRAMUSCULAR; INTRAVENOUS at 01:55

## 2024-05-22 RX ADMIN — ACETAMINOPHEN 650 MG: 325 TABLET ORAL at 08:38

## 2024-05-22 RX ADMIN — METOPROLOL TARTRATE 25 MG: 25 TABLET, FILM COATED ORAL at 08:39

## 2024-05-22 RX ADMIN — HYDRALAZINE HYDROCHLORIDE 10 MG: 20 INJECTION INTRAMUSCULAR; INTRAVENOUS at 00:02

## 2024-05-22 RX ADMIN — HYDROCHLOROTHIAZIDE 25 MG: 25 TABLET ORAL at 11:43

## 2024-05-22 RX ADMIN — METOPROLOL TARTRATE 25 MG: 25 TABLET, FILM COATED ORAL at 20:31

## 2024-05-22 RX ADMIN — Medication 21 PERCENT: at 07:00

## 2024-05-22 RX ADMIN — FOLIC ACID 1 MG: 1 TABLET ORAL at 08:39

## 2024-05-22 RX ADMIN — Medication 1 TABLET: at 08:39

## 2024-05-22 RX ADMIN — HYDRALAZINE HYDROCHLORIDE 10 MG: 20 INJECTION INTRAMUSCULAR; INTRAVENOUS at 16:58

## 2024-05-22 RX ADMIN — IPRATROPIUM BROMIDE AND ALBUTEROL SULFATE 3 ML: .5; 3 SOLUTION RESPIRATORY (INHALATION) at 02:30

## 2024-05-22 RX ADMIN — IPRATROPIUM BROMIDE AND ALBUTEROL SULFATE 3 ML: 2.5; .5 SOLUTION RESPIRATORY (INHALATION) at 16:00

## 2024-05-22 RX ADMIN — POTASSIUM CHLORIDE 20 MEQ: 1500 TABLET, EXTENDED RELEASE ORAL at 06:30

## 2024-05-22 RX ADMIN — LABETALOL HYDROCHLORIDE 10 MG: 5 INJECTION INTRAVENOUS at 01:54

## 2024-05-22 RX ADMIN — MAGNESIUM SULFATE HEPTAHYDRATE 1 G: 1 INJECTION, SOLUTION INTRAVENOUS at 06:25

## 2024-05-22 RX ADMIN — AMLODIPINE BESYLATE 5 MG: 5 TABLET ORAL at 08:39

## 2024-05-22 RX ADMIN — NICOTINE 1 PATCH: 21 PATCH, EXTENDED RELEASE TRANSDERMAL at 08:40

## 2024-05-22 RX ADMIN — ENOXAPARIN SODIUM 40 MG: 40 INJECTION SUBCUTANEOUS at 08:39

## 2024-05-22 RX ADMIN — Medication 100 MG: at 08:39

## 2024-05-22 SDOH — SOCIAL STABILITY: SOCIAL INSECURITY: WITHIN THE LAST YEAR, HAVE YOU BEEN HUMILIATED OR EMOTIONALLY ABUSED IN OTHER WAYS BY YOUR PARTNER OR EX-PARTNER?: NO

## 2024-05-22 SDOH — SOCIAL STABILITY: SOCIAL INSECURITY
WITHIN THE LAST YEAR, HAVE TO BEEN RAPED OR FORCED TO HAVE ANY KIND OF SEXUAL ACTIVITY BY YOUR PARTNER OR EX-PARTNER?: NO

## 2024-05-22 SDOH — SOCIAL STABILITY: SOCIAL INSECURITY: WITHIN THE LAST YEAR, HAVE YOU BEEN AFRAID OF YOUR PARTNER OR EX-PARTNER?: NO

## 2024-05-22 SDOH — ECONOMIC STABILITY: HOUSING INSECURITY: IN THE LAST 12 MONTHS, HOW MANY PLACES HAVE YOU LIVED?: 1

## 2024-05-22 SDOH — ECONOMIC STABILITY: FOOD INSECURITY: WITHIN THE PAST 12 MONTHS, THE FOOD YOU BOUGHT JUST DIDN'T LAST AND YOU DIDN'T HAVE MONEY TO GET MORE.: NEVER TRUE

## 2024-05-22 SDOH — HEALTH STABILITY: MENTAL HEALTH
STRESS IS WHEN SOMEONE FEELS TENSE, NERVOUS, ANXIOUS, OR CAN'T SLEEP AT NIGHT BECAUSE THEIR MIND IS TROUBLED. HOW STRESSED ARE YOU?: NOT AT ALL

## 2024-05-22 SDOH — HEALTH STABILITY: MENTAL HEALTH
HOW OFTEN DO YOU NEED TO HAVE SOMEONE HELP YOU WHEN YOU READ INSTRUCTIONS, PAMPHLETS, OR OTHER WRITTEN MATERIAL FROM YOUR DOCTOR OR PHARMACY?: NEVER

## 2024-05-22 SDOH — ECONOMIC STABILITY: INCOME INSECURITY: IN THE LAST 12 MONTHS, WAS THERE A TIME WHEN YOU WERE NOT ABLE TO PAY THE MORTGAGE OR RENT ON TIME?: NO

## 2024-05-22 SDOH — HEALTH STABILITY: MENTAL HEALTH: HOW MANY STANDARD DRINKS CONTAINING ALCOHOL DO YOU HAVE ON A TYPICAL DAY?: 1 OR 2

## 2024-05-22 SDOH — ECONOMIC STABILITY: TRANSPORTATION INSECURITY
IN THE PAST 12 MONTHS, HAS THE LACK OF TRANSPORTATION KEPT YOU FROM MEDICAL APPOINTMENTS OR FROM GETTING MEDICATIONS?: NO

## 2024-05-22 SDOH — HEALTH STABILITY: MENTAL HEALTH: HOW OFTEN DO YOU HAVE A DRINK CONTAINING ALCOHOL?: 2-4 TIMES A MONTH

## 2024-05-22 SDOH — SOCIAL STABILITY: SOCIAL NETWORK
DO YOU BELONG TO ANY CLUBS OR ORGANIZATIONS SUCH AS CHURCH GROUPS UNIONS, FRATERNAL OR ATHLETIC GROUPS, OR SCHOOL GROUPS?: NO

## 2024-05-22 SDOH — SOCIAL STABILITY: SOCIAL NETWORK: HOW OFTEN DO YOU ATTENT MEETINGS OF THE CLUB OR ORGANIZATION YOU BELONG TO?: NEVER

## 2024-05-22 SDOH — HEALTH STABILITY: MENTAL HEALTH: HOW OFTEN DO YOU HAVE 6 OR MORE DRINKS ON ONE OCCASION?: NEVER

## 2024-05-22 SDOH — ECONOMIC STABILITY: HOUSING INSECURITY
IN THE LAST 12 MONTHS, WAS THERE A TIME WHEN YOU DID NOT HAVE A STEADY PLACE TO SLEEP OR SLEPT IN A SHELTER (INCLUDING NOW)?: NO

## 2024-05-22 SDOH — ECONOMIC STABILITY: FOOD INSECURITY: WITHIN THE PAST 12 MONTHS, YOU WORRIED THAT YOUR FOOD WOULD RUN OUT BEFORE YOU GOT MONEY TO BUY MORE.: NEVER TRUE

## 2024-05-22 SDOH — HEALTH STABILITY: PHYSICAL HEALTH: ON AVERAGE, HOW MANY DAYS PER WEEK DO YOU ENGAGE IN MODERATE TO STRENUOUS EXERCISE (LIKE A BRISK WALK)?: 0 DAYS

## 2024-05-22 SDOH — SOCIAL STABILITY: SOCIAL NETWORK: HOW OFTEN DO YOU ATTEND CHURCH OR RELIGIOUS SERVICES?: NEVER

## 2024-05-22 SDOH — SOCIAL STABILITY: SOCIAL INSECURITY
WITHIN THE LAST YEAR, HAVE YOU BEEN KICKED, HIT, SLAPPED, OR OTHERWISE PHYSICALLY HURT BY YOUR PARTNER OR EX-PARTNER?: NO

## 2024-05-22 SDOH — ECONOMIC STABILITY: INCOME INSECURITY: IN THE PAST 12 MONTHS, HAS THE ELECTRIC, GAS, OIL, OR WATER COMPANY THREATENED TO SHUT OFF SERVICE IN YOUR HOME?: NO

## 2024-05-22 SDOH — SOCIAL STABILITY: SOCIAL NETWORK: ARE YOU MARRIED, WIDOWED, DIVORCED, SEPARATED, NEVER MARRIED, OR LIVING WITH A PARTNER?: WIDOWED

## 2024-05-22 SDOH — SOCIAL STABILITY: SOCIAL NETWORK: HOW OFTEN DO YOU GET TOGETHER WITH FRIENDS OR RELATIVES?: MORE THAN THREE TIMES A WEEK

## 2024-05-22 SDOH — ECONOMIC STABILITY: TRANSPORTATION INSECURITY
IN THE PAST 12 MONTHS, HAS LACK OF TRANSPORTATION KEPT YOU FROM MEETINGS, WORK, OR FROM GETTING THINGS NEEDED FOR DAILY LIVING?: NO

## 2024-05-22 SDOH — ECONOMIC STABILITY: INCOME INSECURITY: HOW HARD IS IT FOR YOU TO PAY FOR THE VERY BASICS LIKE FOOD, HOUSING, MEDICAL CARE, AND HEATING?: NOT VERY HARD

## 2024-05-22 SDOH — SOCIAL STABILITY: SOCIAL NETWORK
IN A TYPICAL WEEK, HOW MANY TIMES DO YOU TALK ON THE PHONE WITH FAMILY, FRIENDS, OR NEIGHBORS?: MORE THAN THREE TIMES A WEEK

## 2024-05-22 SDOH — HEALTH STABILITY: PHYSICAL HEALTH: ON AVERAGE, HOW MANY MINUTES DO YOU ENGAGE IN EXERCISE AT THIS LEVEL?: 0 MIN

## 2024-05-22 ASSESSMENT — PAIN SCALES - GENERAL
PAINLEVEL_OUTOF10: 6
PAINLEVEL_OUTOF10: 0 - NO PAIN
PAINLEVEL_OUTOF10: 3

## 2024-05-22 ASSESSMENT — LIFESTYLE VARIABLES
HEADACHE, FULLNESS IN HEAD: NOT PRESENT
AUDIT-C TOTAL SCORE: 2
PAROXYSMAL SWEATS: NO SWEAT VISIBLE
ANXIETY: NO ANXIETY, AT EASE
ORIENTATION AND CLOUDING OF SENSORIUM: ORIENTED AND CAN DO SERIAL ADDITIONS
TREMOR: NO TREMOR
TOTAL SCORE: 0
AGITATION: NORMAL ACTIVITY
NAUSEA AND VOMITING: NO NAUSEA AND NO VOMITING
VISUAL DISTURBANCES: NOT PRESENT
SKIP TO QUESTIONS 9-10: 1
AUDITORY DISTURBANCES: NOT PRESENT

## 2024-05-22 ASSESSMENT — PAIN - FUNCTIONAL ASSESSMENT
PAIN_FUNCTIONAL_ASSESSMENT: 0-10

## 2024-05-22 ASSESSMENT — PAIN DESCRIPTION - LOCATION: LOCATION: HEAD

## 2024-05-22 ASSESSMENT — ACTIVITIES OF DAILY LIVING (ADL): LACK_OF_TRANSPORTATION: NO

## 2024-05-22 NOTE — NURSING NOTE
Assumed care of pt. Pt is resting in bed with no needs at this time. Plan of care discussed and call light in reach

## 2024-05-22 NOTE — PROGRESS NOTES
Critical Care Medicine       Date:  5/22/2024  Patient:  Los Rubio  YOB: 1970  MRN:  [id number]   Admit Date:  5/21/2024    Chief Complaint   Patient presents with    Rapid Heart Rate       Patient is a 53 y.o. male admitted on 5/21/2024  4:55 AM with the following indication(s) for ICU care SVT.   Hospital day 1 ICU day 22h     History of Present Illness:  Los Rubio is a 53 y.o. year old male patient with Past Medical History of asthma and HTN.  The patient states he has been prescribed medication for HTN but stopped taking them about 3 months ago. He presented to ED today after waking up around 3am with heart palpitations.  Associated symptoms were nausea and weakness. Initial heart rate was 204.  EKG showed SVT. Labs remarkable for WBC 15.5, glucose 180, bicarb 23, ALT/AST 50/86.  Patient was given 1 liter fluid bolus, adenosine x's 2 and cardioverted x's 2 without success.  ED provider spoke with cardiology who recommended amiodarone bolus and gtt.     Interval ICU Events:  5/22: Patient remained in normal sinus rhythm overnight, no further episodes of SVT.  However he was hypertensive with 's-180s.  Will add amlodipine today and continue to monitor.     Medical History:  Past Medical History:   Diagnosis Date    Hypertension      History reviewed. No pertinent surgical history.  Medications Prior to Admission   Medication Sig Dispense Refill Last Dose    albuterol 90 mcg/actuation inhaler Inhale 1 puff every 6 hours if needed for wheezing or shortness of breath.   5/21/2024 at 1000     Shellfish derived  Social History     Tobacco Use    Smoking status: Every Day     Current packs/day: 2.00     Average packs/day: 2.0 packs/day for 34.4 years (68.8 ttl pk-yrs)     Types: Cigarettes     Start date: 1990   Substance Use Topics    Drug use: Not Currently     No family history on file.    Hospital Medications:           Current Facility-Administered Medications:     acetaminophen  (Tylenol) tablet 650 mg, 650 mg, oral, q6h PRN, Judy Boo APRN-CNP, 650 mg at 05/22/24 0838    amLODIPine (Norvasc) tablet 5 mg, 5 mg, oral, Daily, Judy Boo APRN-CNP, 5 mg at 05/22/24 0839    dextrose 50 % injection 12.5 g, 12.5 g, intravenous, q15 min PRN, Judy Boo APRN-CNP    dextrose 50 % injection 25 g, 25 g, intravenous, q15 min PRN, Judy Boo, APRN-CNP    enoxaparin (Lovenox) syringe 40 mg, 40 mg, subcutaneous, q24h ETHAN, Judy Boo APRN-CNP, 40 mg at 05/22/24 0839    folic acid (Folvite) tablet 1 mg, 1 mg, oral, Daily, MAEGAN Underwood-CNP, 1 mg at 05/22/24 0839    glucagon (Glucagen) injection 1 mg, 1 mg, intramuscular, q15 min PRN, Judy Boo APRN-CNP    glucagon (Glucagen) injection 1 mg, 1 mg, intramuscular, q15 min PRN, Judy Boo APRN-CNP    hydrALAZINE (Apresoline) injection 10 mg, 10 mg, intravenous, q6h PRN, Judy Boo APRN-CNP, 10 mg at 05/22/24 0002    ipratropium-albuteroL (Duo-Neb) 0.5-2.5 mg/3 mL nebulizer solution 3 mL, 3 mL, nebulization, q6h, Judy Boo APRN-CNP, 3 mL at 05/22/24 0230    metoprolol tartrate (Lopressor) tablet 25 mg, 25 mg, oral, BID, Judy Boo APRN-CNP, 25 mg at 05/22/24 0839    multivitamin with minerals 1 tablet, 1 tablet, oral, Daily, Judy Boo APRN-CNP, 1 tablet at 05/22/24 0839    nicotine (Nicoderm CQ) 21 mg/24 hr patch 1 patch, 1 patch, transdermal, Daily, 1 patch at 05/22/24 0840 **FOLLOWED BY** [START ON 7/2/2024] nicotine (Nicoderm CQ) 14 mg/24 hr patch 1 patch, 1 patch, transdermal, Daily **FOLLOWED BY** [START ON 7/16/2024] nicotine (Nicoderm CQ) 7 mg/24 hr patch 1 patch, 1 patch, transdermal, Daily, Judy Boo, APRN-CNP    ondansetron ODT (Zofran-ODT) disintegrating tablet 4 mg, 4 mg, oral, q8h PRN **OR** ondansetron (Zofran) injection 4 mg, 4 mg, intravenous, q8h PRN, Neeraj Armando PA-C, 4 mg at 05/22/24 0155    oxygen (O2) therapy, , inhalation, Continuous - Inhalation, Judy ARRINGTON  "JAVI BooN-CNP, 21 percent at 05/22/24 0700    thiamine (Vitamin B-1) tablet 100 mg, 100 mg, oral, Daily, GIDEON UnderwoodCNP, 100 mg at 05/22/24 0839    Review of Systems:  14 point review of systems was completed and negative except for those specially mention in my HPI    Physical Exam:    Heart Rate:  []   Temp:  [36.2 °C (97.2 °F)-36.8 °C (98.2 °F)]   Resp:  [12-25]   BP: (121-187)/(100-148)   Height:  [180.3 cm (5' 11\")]   Weight:  [74.1 kg (163 lb 5.8 oz)-76.8 kg (169 lb 5 oz)]   SpO2:  [97 %-100 %]     Physical Exam  Constitutional:       General: He is awake. He is not in acute distress.  HENT:      Head: Normocephalic and atraumatic.   Eyes:      Extraocular Movements: Extraocular movements intact.      Conjunctiva/sclera: Conjunctivae normal.      Pupils: Pupils are equal, round, and reactive to light.   Cardiovascular:      Rate and Rhythm: Normal rate and regular rhythm.      Pulses:           Radial pulses are 2+ on the right side and 2+ on the left side.        Dorsalis pedis pulses are 2+ on the right side and 2+ on the left side.   Pulmonary:      Effort: Pulmonary effort is normal.      Breath sounds: Examination of the right-upper field reveals wheezing. Examination of the right-middle field reveals wheezing. Examination of the right-lower field reveals wheezing. Wheezing present.   Abdominal:      General: Bowel sounds are normal.      Palpations: Abdomen is soft.   Musculoskeletal:      Cervical back: Normal range of motion and neck supple.      Right lower leg: No edema.      Left lower leg: No edema.      Comments: 5/5 strength to all extremities   Skin:     General: Skin is warm and dry.      Capillary Refill: Capillary refill takes less than 2 seconds.   Neurological:      Mental Status: He is alert and oriented to person, place, and time.      GCS: GCS eye subscore is 4. GCS verbal subscore is 5. GCS motor subscore is 6.      Cranial Nerves: Cranial nerves 2-12 are intact. "   Psychiatric:         Behavior: Behavior is cooperative.         Objective:    I have reviewed all medications, laboratory results, and imaging pertinent for today's encounter.           Intake/Output Summary (Last 24 hours) at 5/22/2024 0912  Last data filed at 5/22/2024 0800  Gross per 24 hour   Intake 625.43 ml   Output 2825 ml   Net -2199.57 ml       Results for orders placed or performed during the hospital encounter of 05/21/24 (from the past 24 hour(s))   POCT GLUCOSE   Result Value Ref Range    POCT Glucose 111 (H) 74 - 99 mg/dL   Serial Troponin, 6 Hour ([facility])   Result Value Ref Range    Troponin T, High Sensitivity 36 (HH) <=14 ng/L   Magnesium   Result Value Ref Range    Magnesium 2.10 1.60 - 3.10 mg/dL   Lavender Top   Result Value Ref Range    Extra Tube Hold for add-ons.    Drug Screen, Urine   Result Value Ref Range    Amphetamine Screen, Urine Negative      Barbiturate Screen, Urine Negative      Benzodiazepines Screen, Urine Positive (A)      Cannabinoid Screen, Urine Positive (A)      Cocaine Metabolite Screen, Urine Negative      Fentanyl Screen, Urine Positive (A)       Methadone Screen, Urine Negative      Opiate Screen, Urine Negative      Oxycodone Screen, Urine Negative      PCP Screen, Urine Negative     Urinalysis with Reflex Culture and Microscopic   Result Value Ref Range    Color, Urine Yellow Light-Yellow, Yellow, Dark-Yellow    Appearance, Urine Clear Clear    Specific Gravity, Urine 1.029 1.005 - 1.035    pH, Urine 6.0 5.0, 5.5, 6.0, 6.5, 7.0, 7.5, 8.0    Protein, Urine 20 (TRACE) NEGATIVE, 10 (TRACE), 20 (TRACE) mg/dL    Glucose, Urine Normal Normal mg/dL    Blood, Urine NEGATIVE NEGATIVE    Ketones, Urine NEGATIVE NEGATIVE mg/dL    Bilirubin, Urine NEGATIVE NEGATIVE    Urobilinogen, Urine Normal Normal mg/dL    Nitrite, Urine NEGATIVE NEGATIVE    Leukocyte Esterase, Urine NEGATIVE NEGATIVE   Extra Urine Gray Tube   Result Value Ref Range    Extra Tube Hold for add-ons.     Urinalysis Microscopic   Result Value Ref Range    WBC, Urine 1-5 1-5, NONE /HPF    RBC, Urine NONE NONE, 1-2, 3-5 /HPF    Mucus, Urine FEW Reference range not established. /LPF   Transthoracic Echo (TTE) Complete   Result Value Ref Range    BSA 1.93 m2   POCT GLUCOSE   Result Value Ref Range    POCT Glucose 106 (H) 74 - 99 mg/dL   POCT GLUCOSE   Result Value Ref Range    POCT Glucose 95 74 - 99 mg/dL   POCT GLUCOSE   Result Value Ref Range    POCT Glucose 102 (H) 74 - 99 mg/dL   POCT GLUCOSE   Result Value Ref Range    POCT Glucose 114 (H) 74 - 99 mg/dL   Comprehensive metabolic panel   Result Value Ref Range    Glucose 102 (H) 65 - 99 mg/dL    Sodium 136 133 - 145 mmol/L    Potassium 3.9 3.4 - 5.1 mmol/L    Chloride 102 97 - 107 mmol/L    Bicarbonate 24 24 - 31 mmol/L    Urea Nitrogen 13 8 - 25 mg/dL    Creatinine 0.90 0.40 - 1.60 mg/dL    eGFR >90 >60 mL/min/1.73m*2    Calcium 8.8 8.5 - 10.4 mg/dL    Albumin 3.9 3.5 - 5.0 g/dL    Alkaline Phosphatase 79 35 - 125 U/L    Total Protein 6.2 5.9 - 7.9 g/dL    AST 32 5 - 40 U/L    Bilirubin, Total 0.4 0.1 - 1.2 mg/dL    ALT 47 (H) 5 - 40 U/L    Anion Gap 10 <=19 mmol/L   CBC and Auto Differential   Result Value Ref Range    WBC 14.4 (H) 4.4 - 11.3 x10*3/uL    nRBC 0.0 0.0 - 0.0 /100 WBCs    RBC 5.02 4.50 - 5.90 x10*6/uL    Hemoglobin 13.4 (L) 13.5 - 17.5 g/dL    Hematocrit 39.8 (L) 41.0 - 52.0 %    MCV 79 (L) 80 - 100 fL    MCH 26.7 26.0 - 34.0 pg    MCHC 33.7 32.0 - 36.0 g/dL    RDW 17.6 (H) 11.5 - 14.5 %    Platelets 393 150 - 450 x10*3/uL    Neutrophils % 68.0 40.0 - 80.0 %    Immature Granulocytes %, Automated 0.3 0.0 - 0.9 %    Lymphocytes % 23.0 13.0 - 44.0 %    Monocytes % 6.7 2.0 - 10.0 %    Eosinophils % 1.3 0.0 - 6.0 %    Basophils % 0.7 0.0 - 2.0 %    Neutrophils Absolute 9.78 (H) 1.20 - 7.70 x10*3/uL    Immature Granulocytes Absolute, Automated 0.05 0.00 - 0.70 x10*3/uL    Lymphocytes Absolute 3.30 1.20 - 4.80 x10*3/uL    Monocytes Absolute 0.96 0.10 -  1.00 x10*3/uL    Eosinophils Absolute 0.18 0.00 - 0.70 x10*3/uL    Basophils Absolute 0.10 0.00 - 0.10 x10*3/uL   Magnesium   Result Value Ref Range    Magnesium 1.90 1.60 - 3.10 mg/dL   Phosphorus   Result Value Ref Range    Phosphorus 2.8 2.5 - 4.5 mg/dL   Coagulation Screen   Result Value Ref Range    Protime 10.8 9.3 - 12.7 seconds    INR 1.0 0.9 - 1.2    aPTT 26.5 22.0 - 32.5 seconds   POCT GLUCOSE   Result Value Ref Range    POCT Glucose 121 (H) 74 - 99 mg/dL     Assessment/Plan:     I am currently managing this critically ill patient for the following problems:     Neuro/Psych/Pain Ctrl/Sedation:  #ETOH abuse  #Marijuana use  -Patient states he drinks 2 beers/day   -Neuro checks per protocol  -Patient is A&Ox's 3  -CIWA without medication for now  -Monitor CAM-ICU     Respiratory/ENT:  #Asthma  #Nicotine dependence   -Patient smokes 1 ppd  -CXR no acute process  -Currently on RA  -Continuous pulse oximetry, maintain SPO2>92%  -Aggressive pulmonary toileting/bronchial hygiene  -Encourage IS  -Nicotine patch ordered  -Duonebs Q6  -OOB to chair     Cardiovascular:  #SVT  #Hx HTN  -ECHO 6/2020:  Normal left ventricular systolic function. Estimated ejection  fraction is about 55-60%. Mild TR. Mild left ventricular hypertrophy seen  -ProBNP on admission 582  -Continuous cardiac monitoring  -Maintain goal MAP>65  -EP consulted, patient will need to follow up outpatient.  Declined ablation, wants medical management for now  -Continue metoprolol 25mg BID  -Start amlodipine 5 mg daily  -ECHO read pending  -Monitor and optimize electrolytes, keep K>4, Mg>2     GI:  #Transaminitis resolving  -Diet: Regular  -Daily CMP  -GI prophylaxis: Not indicated  -Bowel regimen: none     Renal/Volume Status (Intra & Extravascular):  -Renal function: 13/0.9  -Daily RFP, Mg, Phos  -Replace electrolytes PRN     Endocrine  #Hyperglycemia resolving  -HA1C 5.7  -Monitor blood glucose daily and PRN  -Goal BS<180  -Hypoglycemic  protocol  -TSH WNL        Infectious Disease:  #Leukocytosis improving  -Afebrile  -WBC 15.5>14.4  -UA negative     Heme/Onc:  -HH: 13.4/39.8  -Daily CBC  -Coags WNL  -VTE prophylaxis: lovenox     Derm/MSK:  -ICU skin protocol     Ethics/Code Status:  -Full code     Lines/Larson/Restranits:  CVC: no  Camila: no  Larson: no  Restraints: no     Dispo: ICU     Plan discussed with: Dr Brink  Critical Care Time:  30 minutes      MAEGAN Underwood-CNP  Pulmonary and Critical Care Medicine

## 2024-05-22 NOTE — CARE PLAN
Problem: Pain - Adult  Goal: Verbalizes/displays adequate comfort level or baseline comfort level  Outcome: Progressing     Problem: Safety - Adult  Goal: Free from fall injury  Outcome: Progressing     Problem: Discharge Planning  Goal: Discharge to home or other facility with appropriate resources  Outcome: Progressing     Problem: Chronic Conditions and Co-morbidities  Goal: Patient's chronic conditions and co-morbidity symptoms are monitored and maintained or improved  Outcome: Progressing     Problem: Respiratory  Goal: Clear secretions with interventions this shift  Outcome: Progressing  Goal: Minimize anxiety/maximize coping throughout shift  Outcome: Progressing  Goal: Minimal/no exertional discomfort or dyspnea this shift  Outcome: Progressing     Problem: Hypertensive Emergency/Crisis  Goal: Blood pressure gradually reduced to goal range  Outcome: Progressing  Goal: Lab values return to normal range  Outcome: Progressing  Goal: Promote self management  Outcome: Progressing     Problem: Skin  Goal: Decreased wound size/increased tissue granulation at next dressing change  Outcome: Progressing  Flowsheets (Taken 5/22/2024 1215)  Decreased wound size/increased tissue granulation at next dressing change:   Promote sleep for wound healing   Utilize specialty bed per algorithm   Protective dressings over bony prominences  Goal: Participates in plan/prevention/treatment measures  Outcome: Progressing  Flowsheets (Taken 5/22/2024 1215)  Participates in plan/prevention/treatment measures:   Discuss with provider PT/OT consult   Elevate heels   Increase activity/out of bed for meals  Goal: Prevent/manage excess moisture  Outcome: Progressing  Flowsheets (Taken 5/22/2024 1215)  Prevent/manage excess moisture:   Cleanse incontinence/protect with barrier cream   Moisturize dry skin   Follow provider orders for dressing changes   Monitor for/manage infection if present  Goal: Prevent/minimize sheer/friction  injuries  Outcome: Progressing  Flowsheets (Taken 5/22/2024 1215)  Prevent/minimize sheer/friction injuries:   Complete micro-shifts as needed if patient unable. Adjust patient position to relieve pressure points, not a full turn   Increase activity/out of bed for meals   Use pull sheet   HOB 30 degrees or less   Turn/reposition every 2 hours/use positioning/transfer devices   Utilize specialty bed per algorithm  Goal: Promote/optimize nutrition  Outcome: Progressing  Flowsheets (Taken 5/22/2024 1215)  Promote/optimize nutrition:   Offer water/supplements/favorite foods   Monitor/record intake including meals  Goal: Promote skin healing  Outcome: Progressing  Flowsheets (Taken 5/22/2024 1215)  Promote skin healing:   Assess skin/pad under line(s)/device(s)   Protective dressings over bony prominences   Turn/reposition every 2 hours/use positioning/transfer devices   Ensure correct size (line/device) and apply per  instructions   Rotate device position/do not position patient on device     Problem: Pain  Goal: My pain/discomfort is manageable  Outcome: Progressing     Problem: Safety  Goal: Patient will be injury free during hospitalization  Outcome: Progressing  Goal: I will remain free of falls  Outcome: Progressing     Problem: Daily Care  Goal: Daily care needs are met  Outcome: Progressing     Problem: Psychosocial Needs  Goal: Demonstrates ability to cope with hospitalization/illness  Outcome: Progressing  Goal: Collaborate with me, my family, and caregiver to identify my specific goals  Outcome: Progressing     Problem: Discharge Barriers  Goal: My discharge needs are met  Outcome: Progressing   The patient's goals for the shift include normal BP and HR    The clinical goals for the shift include control and stablize BP and HR

## 2024-05-22 NOTE — CARE PLAN
The patient's goals for the shift include normal BP and HR    The clinical goals for the shift include control and stablize BP and HR      Problem: Pain - Adult  Goal: Verbalizes/displays adequate comfort level or baseline comfort level  Outcome: Progressing     Problem: Safety - Adult  Goal: Free from fall injury  Outcome: Progressing     Problem: Discharge Planning  Goal: Discharge to home or other facility with appropriate resources  Outcome: Progressing     Problem: Chronic Conditions and Co-morbidities  Goal: Patient's chronic conditions and co-morbidity symptoms are monitored and maintained or improved  Outcome: Progressing     Problem: Respiratory  Goal: Clear secretions with interventions this shift  Outcome: Progressing  Goal: Minimize anxiety/maximize coping throughout shift  Outcome: Progressing  Goal: Minimal/no exertional discomfort or dyspnea this shift  Outcome: Progressing     Problem: Hypertensive Emergency/Crisis  Goal: Blood pressure gradually reduced to goal range  Outcome: Progressing  Goal: Lab values return to normal range  Outcome: Progressing  Goal: Promote self management  Outcome: Progressing     Problem: Skin  Goal: Decreased wound size/increased tissue granulation at next dressing change  Outcome: Progressing  Flowsheets (Taken 5/22/2024 0055)  Decreased wound size/increased tissue granulation at next dressing change: Utilize specialty bed per algorithm  Goal: Participates in plan/prevention/treatment measures  Outcome: Progressing  Flowsheets (Taken 5/22/2024 0055)  Participates in plan/prevention/treatment measures: Increase activity/out of bed for meals  Goal: Prevent/manage excess moisture  Outcome: Progressing  Flowsheets (Taken 5/22/2024 0055)  Prevent/manage excess moisture: Cleanse incontinence/protect with barrier cream  Goal: Prevent/minimize sheer/friction injuries  Outcome: Progressing  Flowsheets (Taken 5/22/2024 0055)  Prevent/minimize sheer/friction injuries: Increase  activity/out of bed for meals  Goal: Promote/optimize nutrition  Outcome: Progressing  Flowsheets (Taken 5/22/2024 0055)  Promote/optimize nutrition:   Consume > 50% meals/supplements   Monitor/record intake including meals  Goal: Promote skin healing  Outcome: Progressing  Flowsheets (Taken 5/22/2024 0055)  Promote skin healing:   Turn/reposition every 2 hours/use positioning/transfer devices   Assess skin/pad under line(s)/device(s)   Protective dressings over bony prominences     Problem: Pain  Goal: My pain/discomfort is manageable  Outcome: Progressing     Problem: Safety  Goal: Patient will be injury free during hospitalization  Outcome: Progressing  Goal: I will remain free of falls  Outcome: Progressing     Problem: Daily Care  Goal: Daily care needs are met  Outcome: Progressing     Problem: Psychosocial Needs  Goal: Demonstrates ability to cope with hospitalization/illness  Outcome: Progressing  Goal: Collaborate with me, my family, and caregiver to identify my specific goals  Outcome: Progressing     Problem: Discharge Barriers  Goal: My discharge needs are met  Outcome: Progressing

## 2024-05-22 NOTE — PROGRESS NOTES
TCC met with patient. Assessment complete. Patient lives with his daughter in an apartment. Patient is independent. Patient drives and is able to shop, cook and clean. Patient smokes 2 packs of Black and Milds per day. Patient smokes marijuana. Patient follows Dr. Rachael Portillo. Patient fills prescriptions at [facility] in [place]. At this time the discharge plan is to return home with no skilled services. Will follow.      05/22/24 3019   Discharge Planning   Living Arrangements Children   Support Systems Children   Type of Residence Private residence   Home or Post Acute Services None   Patient expects to be discharged to: Home   Does the patient need discharge transport arranged? No   Financial Resource Strain   How hard is it for you to pay for the very basics like food, housing, medical care, and heating? Not very   Housing Stability   In the last 12 months, was there a time when you were not able to pay the mortgage or rent on time? N   In the last 12 months, how many places have you lived? 1   In the last 12 months, was there a time when you did not have a steady place to sleep or slept in a shelter (including now)? N   Transportation Needs   In the past 12 months, has lack of transportation kept you from medical appointments or from getting medications? no   In the past 12 months, has lack of transportation kept you from meetings, work, or from getting things needed for daily living? No

## 2024-05-22 NOTE — PROGRESS NOTES
05/22/24 1347   Physical Activity   On average, how many days per week do you engage in moderate to strenuous exercise (like a brisk walk)? 0 days   On average, how many minutes do you engage in exercise at this level? 0 min   Financial Resource Strain   How hard is it for you to pay for the very basics like food, housing, medical care, and heating? Not very   Housing Stability   In the last 12 months, was there a time when you were not able to pay the mortgage or rent on time? N   In the last 12 months, how many places have you lived? 1   In the last 12 months, was there a time when you did not have a steady place to sleep or slept in a shelter (including now)? N   Transportation Needs   In the past 12 months, has lack of transportation kept you from medical appointments or from getting medications? no   In the past 12 months, has lack of transportation kept you from meetings, work, or from getting things needed for daily living? No   Food Insecurity   Within the past 12 months, you worried that your food would run out before you got the money to buy more. Never true   Within the past 12 months, the food you bought just didn't last and you didn't have money to get more. Never true   Stress   Do you feel stress - tense, restless, nervous, or anxious, or unable to sleep at night because your mind is troubled all the time - these days? Not at all   Social Connections   In a typical week, how many times do you talk on the phone with family, friends, or neighbors? More than 3   How often do you get together with friends or relatives? More than 3   How often do you attend Restorationism or Judaism services? Never   Do you belong to any clubs or organizations such as Restorationism groups, unions, fraternal or athletic groups, or school groups? No   How often do you attend meetings of the clubs or organizations you belong to? Never   Are you , , , , never , or living with a partner?     Intimate Partner Violence   Within the last year, have you been afraid of your partner or ex-partner? No   Within the last year, have you been humiliated or emotionally abused in other ways by your partner or ex-partner? No   Within the last year, have you been kicked, hit, slapped, or otherwise physically hurt by your partner or ex-partner? No   Within the last year, have you been raped or forced to have any kind of sexual activity by your partner or ex-partner? No   Alcohol Use   Q1: How often do you have a drink containing alcohol? 2-4 pr month   Q2: How many drinks containing alcohol do you have on a typical day when you are drinking? 1 or 2   Q3: How often do you have six or more drinks on one occasion? Never   Utilities   In the past 12 months has the electric, gas, oil, or water company threatened to shut off services in your home? No   Health Literacy   How often do you need to have someone help you when you read instructions, pamphlets, or other written material from your doctor or pharmacy? Never

## 2024-05-22 NOTE — PROGRESS NOTES
"Los Rubio is a 53 y.o. male on day 1 of admission presenting with SVT (supraventricular tachycardia) (CMS-[facility]).    Subjective   No complaints        Objective     Physical Exam  Gen: A+O x 3, no acute distress  HEENT: Normocephalic/atraumatic pupils equal react light  Neck: No JVD, upstrokes and volumes nl, no bruits   Lung: CTA, nl AP diameter  CV:  nl sounding S1, S2, no murmur, PMI nondisplaced  Abdomen: soft, non tender, + BS x 4   Extremities: warm to touch, palpable pulses bilaterally, no edema   Neuro: no neurologic deficits    Last Recorded Vitals  Blood pressure (!) 167/127, pulse 62, temperature 36.4 °C (97.5 °F), temperature source Oral, resp. rate 18, height 1.803 m (5' 11\"), weight 76.8 kg (169 lb 5 oz), SpO2 99%.  Intake/Output last 3 Shifts:  I/O last 3 completed shifts:  In: 625.4 (8.1 mL/kg) [P.O.:400; I.V.:225.4 (2.9 mL/kg)]  Out: 1975 (25.7 mL/kg) [Urine:1975 (0.7 mL/kg/hr)]  Weight: 76.8 kg     Relevant Results  Results for orders placed or performed during the hospital encounter of 05/21/24 (from the past 24 hour(s))   POCT GLUCOSE   Result Value Ref Range    POCT Glucose 106 (H) 74 - 99 mg/dL   POCT GLUCOSE   Result Value Ref Range    POCT Glucose 95 74 - 99 mg/dL   POCT GLUCOSE   Result Value Ref Range    POCT Glucose 102 (H) 74 - 99 mg/dL   POCT GLUCOSE   Result Value Ref Range    POCT Glucose 114 (H) 74 - 99 mg/dL   Comprehensive metabolic panel   Result Value Ref Range    Glucose 102 (H) 65 - 99 mg/dL    Sodium 136 133 - 145 mmol/L    Potassium 3.9 3.4 - 5.1 mmol/L    Chloride 102 97 - 107 mmol/L    Bicarbonate 24 24 - 31 mmol/L    Urea Nitrogen 13 8 - 25 mg/dL    Creatinine 0.90 0.40 - 1.60 mg/dL    eGFR >90 >60 mL/min/1.73m*2    Calcium 8.8 8.5 - 10.4 mg/dL    Albumin 3.9 3.5 - 5.0 g/dL    Alkaline Phosphatase 79 35 - 125 U/L    Total Protein 6.2 5.9 - 7.9 g/dL    AST 32 5 - 40 U/L    Bilirubin, Total 0.4 0.1 - 1.2 mg/dL    ALT 47 (H) 5 - 40 U/L    Anion Gap 10 <=19 mmol/L   CBC and " Auto Differential   Result Value Ref Range    WBC 14.4 (H) 4.4 - 11.3 x10*3/uL    nRBC 0.0 0.0 - 0.0 /100 WBCs    RBC 5.02 4.50 - 5.90 x10*6/uL    Hemoglobin 13.4 (L) 13.5 - 17.5 g/dL    Hematocrit 39.8 (L) 41.0 - 52.0 %    MCV 79 (L) 80 - 100 fL    MCH 26.7 26.0 - 34.0 pg    MCHC 33.7 32.0 - 36.0 g/dL    RDW 17.6 (H) 11.5 - 14.5 %    Platelets 393 150 - 450 x10*3/uL    Neutrophils % 68.0 40.0 - 80.0 %    Immature Granulocytes %, Automated 0.3 0.0 - 0.9 %    Lymphocytes % 23.0 13.0 - 44.0 %    Monocytes % 6.7 2.0 - 10.0 %    Eosinophils % 1.3 0.0 - 6.0 %    Basophils % 0.7 0.0 - 2.0 %    Neutrophils Absolute 9.78 (H) 1.20 - 7.70 x10*3/uL    Immature Granulocytes Absolute, Automated 0.05 0.00 - 0.70 x10*3/uL    Lymphocytes Absolute 3.30 1.20 - 4.80 x10*3/uL    Monocytes Absolute 0.96 0.10 - 1.00 x10*3/uL    Eosinophils Absolute 0.18 0.00 - 0.70 x10*3/uL    Basophils Absolute 0.10 0.00 - 0.10 x10*3/uL   Magnesium   Result Value Ref Range    Magnesium 1.90 1.60 - 3.10 mg/dL   Phosphorus   Result Value Ref Range    Phosphorus 2.8 2.5 - 4.5 mg/dL   Coagulation Screen   Result Value Ref Range    Protime 10.8 9.3 - 12.7 seconds    INR 1.0 0.9 - 1.2    aPTT 26.5 22.0 - 32.5 seconds   POCT GLUCOSE   Result Value Ref Range    POCT Glucose 121 (H) 74 - 99 mg/dL           Assessment/Plan   Principal Problem:    SVT (supraventricular tachycardia) (CMS-[facility])  53-year-old male admitted yesterday with SVT refractory to adenosine and ultimately converted with IV amiodarone and beta-blocker and has remained in sinus rhythm since admission  Has marked hypertension both systolic and diastolic started on thiazide diuretic utilizing beta-blocker at a low dose for AV node   Intensivist managing patient, patient reluctant to utilize medication though suspect he may even need a third agent to manage his pressure   Will continue to follow his rhythm and schedule for an outpatient visit with Dr. NEREIDA Crawford     I spent 20 minutes in the  professional and overall care of this patient.      Uma Sandoval, APRN-CNP

## 2024-05-22 NOTE — PROGRESS NOTES
05/22/24 1358   [facility] Disability Status   Are you deaf or do you have serious difficulty hearing? N   Are you blind or do you have serious difficulty seeing, even when wearing glasses? N   Because of a physical, mental, or emotional condition, do you have serious difficulty concentrating, remembering, or making decisions? (5 years old or older) N   Do you have serious difficulty walking or climbing stairs? N   Do you have serious difficulty dressing or bathing? N   Because of a physical, mental, or emotional condition, do you have serious difficulty doing errands alone such as visiting the doctor? N

## 2024-05-23 LAB
ALBUMIN SERPL-MCNC: 4 G/DL (ref 3.5–5)
ALP BLD-CCNC: 86 U/L (ref 35–125)
ALT SERPL-CCNC: 41 U/L (ref 5–40)
ANION GAP SERPL CALC-SCNC: 14 MMOL/L
AST SERPL-CCNC: 23 U/L (ref 5–40)
BASOPHILS # BLD AUTO: 0.1 X10*3/UL (ref 0–0.1)
BASOPHILS NFR BLD AUTO: 0.8 %
BILIRUB SERPL-MCNC: 0.6 MG/DL (ref 0.1–1.2)
BUN SERPL-MCNC: 12 MG/DL (ref 8–25)
CALCIUM SERPL-MCNC: 9.7 MG/DL (ref 8.5–10.4)
CHLORIDE SERPL-SCNC: 98 MMOL/L (ref 97–107)
CO2 SERPL-SCNC: 24 MMOL/L (ref 24–31)
CREAT SERPL-MCNC: 1 MG/DL (ref 0.4–1.6)
EGFRCR SERPLBLD CKD-EPI 2021: 90 ML/MIN/1.73M*2
EOSINOPHIL # BLD AUTO: 0.25 X10*3/UL (ref 0–0.7)
EOSINOPHIL NFR BLD AUTO: 1.9 %
ERYTHROCYTE [DISTWIDTH] IN BLOOD BY AUTOMATED COUNT: 18.3 % (ref 11.5–14.5)
GLUCOSE BLD MANUAL STRIP-MCNC: 179 MG/DL (ref 74–99)
GLUCOSE SERPL-MCNC: 92 MG/DL (ref 65–99)
HCT VFR BLD AUTO: 47.4 % (ref 41–52)
HGB BLD-MCNC: 16 G/DL (ref 13.5–17.5)
IMM GRANULOCYTES # BLD AUTO: 0.04 X10*3/UL (ref 0–0.7)
IMM GRANULOCYTES NFR BLD AUTO: 0.3 % (ref 0–0.9)
LYMPHOCYTES # BLD AUTO: 2.93 X10*3/UL (ref 1.2–4.8)
LYMPHOCYTES NFR BLD AUTO: 22 %
MAGNESIUM SERPL-MCNC: 1.8 MG/DL (ref 1.6–3.1)
MCH RBC QN AUTO: 26.2 PG (ref 26–34)
MCHC RBC AUTO-ENTMCNC: 33.8 G/DL (ref 32–36)
MCV RBC AUTO: 78 FL (ref 80–100)
MONOCYTES # BLD AUTO: 1.15 X10*3/UL (ref 0.1–1)
MONOCYTES NFR BLD AUTO: 8.6 %
NEUTROPHILS # BLD AUTO: 8.86 X10*3/UL (ref 1.2–7.7)
NEUTROPHILS NFR BLD AUTO: 66.4 %
NRBC BLD-RTO: 0 /100 WBCS (ref 0–0)
PHOSPHATE SERPL-MCNC: 4.1 MG/DL (ref 2.5–4.5)
PLATELET # BLD AUTO: 433 X10*3/UL (ref 150–450)
POTASSIUM SERPL-SCNC: 3.9 MMOL/L (ref 3.4–5.1)
PROT SERPL-MCNC: 7 G/DL (ref 5.9–7.9)
RBC # BLD AUTO: 6.11 X10*6/UL (ref 4.5–5.9)
SODIUM SERPL-SCNC: 136 MMOL/L (ref 133–145)
WBC # BLD AUTO: 13.3 X10*3/UL (ref 4.4–11.3)

## 2024-05-23 PROCEDURE — 9420000001 HC RT PATIENT EDUCATION 5 MIN

## 2024-05-23 PROCEDURE — 99291 CRITICAL CARE FIRST HOUR: CPT

## 2024-05-23 PROCEDURE — 83735 ASSAY OF MAGNESIUM: CPT

## 2024-05-23 PROCEDURE — 2500000005 HC RX 250 GENERAL PHARMACY W/O HCPCS

## 2024-05-23 PROCEDURE — 85025 COMPLETE CBC W/AUTO DIFF WBC: CPT

## 2024-05-23 PROCEDURE — 82947 ASSAY GLUCOSE BLOOD QUANT: CPT

## 2024-05-23 PROCEDURE — 2500000005 HC RX 250 GENERAL PHARMACY W/O HCPCS: Performed by: INTERNAL MEDICINE

## 2024-05-23 PROCEDURE — 84100 ASSAY OF PHOSPHORUS: CPT

## 2024-05-23 PROCEDURE — 80053 COMPREHEN METABOLIC PANEL: CPT

## 2024-05-23 PROCEDURE — 2500000001 HC RX 250 WO HCPCS SELF ADMINISTERED DRUGS (ALT 637 FOR MEDICARE OP)

## 2024-05-23 PROCEDURE — 36415 COLL VENOUS BLD VENIPUNCTURE: CPT

## 2024-05-23 PROCEDURE — S4991 NICOTINE PATCH NONLEGEND: HCPCS

## 2024-05-23 PROCEDURE — 2500000004 HC RX 250 GENERAL PHARMACY W/ HCPCS (ALT 636 FOR OP/ED)

## 2024-05-23 PROCEDURE — 94640 AIRWAY INHALATION TREATMENT: CPT

## 2024-05-23 PROCEDURE — 2500000002 HC RX 250 W HCPCS SELF ADMINISTERED DRUGS (ALT 637 FOR MEDICARE OP, ALT 636 FOR OP/ED)

## 2024-05-23 PROCEDURE — 2500000001 HC RX 250 WO HCPCS SELF ADMINISTERED DRUGS (ALT 637 FOR MEDICARE OP): Performed by: INTERNAL MEDICINE

## 2024-05-23 PROCEDURE — 2060000001 HC INTERMEDIATE ICU ROOM DAILY

## 2024-05-23 RX ORDER — HYDROCHLOROTHIAZIDE 25 MG/1
25 TABLET ORAL DAILY
Status: DISCONTINUED | OUTPATIENT
Start: 2024-05-24 | End: 2024-05-24

## 2024-05-23 RX ORDER — POTASSIUM CHLORIDE 1.5 G/1.58G
20 POWDER, FOR SOLUTION ORAL ONCE
Status: COMPLETED | OUTPATIENT
Start: 2024-05-23 | End: 2024-05-23

## 2024-05-23 RX ORDER — LIDOCAINE 560 MG/1
1 PATCH PERCUTANEOUS; TOPICAL; TRANSDERMAL DAILY
Status: DISCONTINUED | OUTPATIENT
Start: 2024-05-23 | End: 2024-05-24 | Stop reason: HOSPADM

## 2024-05-23 RX ORDER — MAGNESIUM SULFATE HEPTAHYDRATE 40 MG/ML
2 INJECTION, SOLUTION INTRAVENOUS ONCE
Status: COMPLETED | OUTPATIENT
Start: 2024-05-23 | End: 2024-05-23

## 2024-05-23 RX ORDER — HYDROCHLOROTHIAZIDE 25 MG/1
50 TABLET ORAL DAILY
Status: DISCONTINUED | OUTPATIENT
Start: 2024-05-23 | End: 2024-05-23

## 2024-05-23 RX ORDER — METOPROLOL TARTRATE 50 MG/1
50 TABLET ORAL 2 TIMES DAILY
Status: DISCONTINUED | OUTPATIENT
Start: 2024-05-23 | End: 2024-05-24

## 2024-05-23 RX ADMIN — LIDOCAINE 1 PATCH: 4 PATCH TOPICAL at 23:12

## 2024-05-23 RX ADMIN — Medication 100 MG: at 09:38

## 2024-05-23 RX ADMIN — IPRATROPIUM BROMIDE AND ALBUTEROL SULFATE 3 ML: 2.5; .5 SOLUTION RESPIRATORY (INHALATION) at 20:22

## 2024-05-23 RX ADMIN — Medication 1 TABLET: at 09:38

## 2024-05-23 RX ADMIN — METOPROLOL TARTRATE 50 MG: 50 TABLET, FILM COATED ORAL at 09:38

## 2024-05-23 RX ADMIN — POTASSIUM CHLORIDE 20 MEQ: 1.5 FOR SOLUTION ORAL at 06:20

## 2024-05-23 RX ADMIN — ENOXAPARIN SODIUM 40 MG: 40 INJECTION SUBCUTANEOUS at 09:38

## 2024-05-23 RX ADMIN — Medication 21 PERCENT: at 20:00

## 2024-05-23 RX ADMIN — FOLIC ACID 1 MG: 1 TABLET ORAL at 09:38

## 2024-05-23 RX ADMIN — Medication 0.01 L/MIN: at 08:00

## 2024-05-23 RX ADMIN — IPRATROPIUM BROMIDE AND ALBUTEROL SULFATE 3 ML: 2.5; .5 SOLUTION RESPIRATORY (INHALATION) at 04:31

## 2024-05-23 RX ADMIN — HYDROCHLOROTHIAZIDE 50 MG: 25 TABLET ORAL at 09:38

## 2024-05-23 RX ADMIN — MAGNESIUM SULFATE HEPTAHYDRATE 2 G: 40 INJECTION, SOLUTION INTRAVENOUS at 06:20

## 2024-05-23 RX ADMIN — NICOTINE 1 PATCH: 21 PATCH, EXTENDED RELEASE TRANSDERMAL at 09:37

## 2024-05-23 RX ADMIN — METOPROLOL TARTRATE 50 MG: 50 TABLET, FILM COATED ORAL at 20:49

## 2024-05-23 ASSESSMENT — PAIN SCALES - GENERAL
PAINLEVEL_OUTOF10: 5 - MODERATE PAIN
PAINLEVEL_OUTOF10: 0 - NO PAIN

## 2024-05-23 ASSESSMENT — COGNITIVE AND FUNCTIONAL STATUS - GENERAL
MOBILITY SCORE: 24
DAILY ACTIVITIY SCORE: 24

## 2024-05-23 ASSESSMENT — PAIN - FUNCTIONAL ASSESSMENT
PAIN_FUNCTIONAL_ASSESSMENT: 0-10

## 2024-05-23 ASSESSMENT — PAIN DESCRIPTION - DESCRIPTORS: DESCRIPTORS: OTHER (COMMENT)

## 2024-05-23 NOTE — PROGRESS NOTES
Critical Care Medicine       Date:  5/23/2024  Patient:  Los Rubio  YOB: 1970  MRN:  [id number]   Admit Date:  5/21/2024    Chief Complaint   Patient presents with    Rapid Heart Rate       Patient is a 53 y.o. male admitted on 5/21/2024  4:55 AM with the following indication(s) for ICU care SVT.   Hospital day 2 ICU day 1d 22h     History of Present Illness:  Los Rubio is a 53 y.o. year old male patient with Past Medical History of asthma and HTN.  The patient states he has been prescribed medication for HTN but stopped taking them about 3 months ago. He presented to ED today after waking up around 3am with heart palpitations.  Associated symptoms were nausea and weakness. Initial heart rate was 204.  EKG showed SVT. Labs remarkable for WBC 15.5, glucose 180, bicarb 23, ALT/AST 50/86.  Patient was given 1 liter fluid bolus, adenosine x's 2 and cardioverted x's 2 without success.  ED provider spoke with cardiology who recommended amiodarone bolus and gtt.     Interval ICU Events:  5/22: Patient remained in normal sinus rhythm overnight, no further episodes of SVT.  However he was hypertensive with 's-180s.  Will add amlodipine today and continue to monitor.     5/23: SBP improving 140's to 160's. DBP still elevated 110's.  ECHO shows EF 35%.  Cardiology placed on consult.  Metoprolol and hydrochlorothiazide increased.    Medical History:  Past Medical History:   Diagnosis Date    Hypertension      History reviewed. No pertinent surgical history.  Medications Prior to Admission   Medication Sig Dispense Refill Last Dose    albuterol 90 mcg/actuation inhaler Inhale 1 puff every 6 hours if needed for wheezing or shortness of breath.   5/21/2024 at 1000     Shellfish derived  Social History     Tobacco Use    Smoking status: Every Day     Current packs/day: 2.00     Average packs/day: 2.0 packs/day for 34.4 years (68.8 ttl pk-yrs)     Types: Cigarettes     Start date: 1990   Substance  Use Topics    Drug use: Not Currently     No family history on file.    Hospital Medications:           Current Facility-Administered Medications:     acetaminophen (Tylenol) tablet 650 mg, 650 mg, oral, q6h PRN, MAEGAN Underwood-CNP, 650 mg at 05/22/24 0838    dextrose 50 % injection 12.5 g, 12.5 g, intravenous, q15 min PRN, MAEGAN Underwood-CNP    dextrose 50 % injection 25 g, 25 g, intravenous, q15 min PRN, MAEGAN Underwood-CNP    enoxaparin (Lovenox) syringe 40 mg, 40 mg, subcutaneous, q24h ETHAN, MAEGAN Underwood-CNP, 40 mg at 05/22/24 0839    folic acid (Folvite) tablet 1 mg, 1 mg, oral, Daily, MAEGAN Underwood-CNP, 1 mg at 05/22/24 0839    glucagon (Glucagen) injection 1 mg, 1 mg, intramuscular, q15 min PRN, MAEGAN Underwood-CNP    glucagon (Glucagen) injection 1 mg, 1 mg, intramuscular, q15 min PRN, uJdy Boo APRN-CNP    hydrALAZINE (Apresoline) injection 10 mg, 10 mg, intravenous, q4h PRN, MAEGAN Underwood-CNP, 10 mg at 05/22/24 1658    hydroCHLOROthiazide (HYDRODiuril) tablet 50 mg, 50 mg, oral, Daily, Edgardo Brink MD    ipratropium-albuteroL (Duo-Neb) 0.5-2.5 mg/3 mL nebulizer solution 3 mL, 3 mL, nebulization, q6h PRN, MAEGAN Underwood-CNP, 3 mL at 05/23/24 0431    labetaloL (Normodyne,Trandate) injection 10 mg, 10 mg, intravenous, q4h PRN, MAEGAN Underwood-JUDY    metoprolol tartrate (Lopressor) tablet 50 mg, 50 mg, oral, BID, Edgardo Brink MD    multivitamin with minerals 1 tablet, 1 tablet, oral, Daily, MAEGAN Underwood-CNP, 1 tablet at 05/22/24 0839    nicotine (Nicoderm CQ) 21 mg/24 hr patch 1 patch, 1 patch, transdermal, Daily, 1 patch at 05/22/24 0840 **FOLLOWED BY** [START ON 7/2/2024] nicotine (Nicoderm CQ) 14 mg/24 hr patch 1 patch, 1 patch, transdermal, Daily **FOLLOWED BY** [START ON 7/16/2024] nicotine (Nicoderm CQ) 7 mg/24 hr patch 1 patch, 1 patch, transdermal, Daily, Judy Boo APRN-CNP    ondansetron ODT (Zofran-ODT)  disintegrating tablet 4 mg, 4 mg, oral, q8h PRN **OR** ondansetron (Zofran) injection 4 mg, 4 mg, intravenous, q8h PRN, Neeraj Armando PA-C, 4 mg at 05/22/24 0155    oxygen (O2) therapy, , inhalation, Continuous - Inhalation, Judy Boo APRN-CNP, 21 percent at 05/22/24 0700    thiamine (Vitamin B-1) tablet 100 mg, 100 mg, oral, Daily, Judy Boo, APRN-CNP, 100 mg at 05/22/24 0839    Review of Systems:  14 point review of systems was completed and negative except for those specially mention in my HPI    Physical Exam:    Heart Rate:  []   Temp:  [36.4 °C (97.5 °F)-36.7 °C (98.1 °F)]   Resp:  [18-32]   BP: (113-175)/()   Weight:  [73.2 kg (161 lb 6 oz)-76.8 kg (169 lb 5 oz)]   SpO2:  [94 %-100 %]     Physical Exam  Constitutional:       General: He is awake. He is not in acute distress.  HENT:      Head: Normocephalic and atraumatic.   Eyes:      Extraocular Movements: Extraocular movements intact.      Conjunctiva/sclera: Conjunctivae normal.      Pupils: Pupils are equal, round, and reactive to light.   Cardiovascular:      Rate and Rhythm: Normal rate and regular rhythm.      Pulses:           Radial pulses are 2+ on the right side and 2+ on the left side.        Dorsalis pedis pulses are 2+ on the right side and 2+ on the left side.   Pulmonary:      Effort: Pulmonary effort is normal.      Breath sounds: Normal breath sounds.   Abdominal:      General: Bowel sounds are normal.      Palpations: Abdomen is soft.   Musculoskeletal:      Cervical back: Normal range of motion and neck supple.      Right lower leg: No edema.      Left lower leg: No edema.      Comments: 5/5 strength to all extremities   Skin:     General: Skin is warm and dry.      Capillary Refill: Capillary refill takes less than 2 seconds.   Neurological:      Mental Status: He is alert and oriented to person, place, and time.      GCS: GCS eye subscore is 4. GCS verbal subscore is 5. GCS motor subscore is 6.      Cranial  Nerves: Cranial nerves 2-12 are intact.   Psychiatric:         Behavior: Behavior is cooperative.         Objective:    I have reviewed all medications, laboratory results, and imaging pertinent for today's encounter.           Intake/Output Summary (Last 24 hours) at 5/23/2024 0830  Last data filed at 5/23/2024 0620  Gross per 24 hour   Intake 100 ml   Output 3475 ml   Net -3375 ml       Results for orders placed or performed during the hospital encounter of 05/21/24 (from the past 24 hour(s))   Comprehensive metabolic panel   Result Value Ref Range    Glucose 92 65 - 99 mg/dL    Sodium 136 133 - 145 mmol/L    Potassium 3.9 3.4 - 5.1 mmol/L    Chloride 98 97 - 107 mmol/L    Bicarbonate 24 24 - 31 mmol/L    Urea Nitrogen 12 8 - 25 mg/dL    Creatinine 1.00 0.40 - 1.60 mg/dL    eGFR 90 >60 mL/min/1.73m*2    Calcium 9.7 8.5 - 10.4 mg/dL    Albumin 4.0 3.5 - 5.0 g/dL    Alkaline Phosphatase 86 35 - 125 U/L    Total Protein 7.0 5.9 - 7.9 g/dL    AST 23 5 - 40 U/L    Bilirubin, Total 0.6 0.1 - 1.2 mg/dL    ALT 41 (H) 5 - 40 U/L    Anion Gap 14 <=19 mmol/L   CBC and Auto Differential   Result Value Ref Range    WBC 13.3 (H) 4.4 - 11.3 x10*3/uL    nRBC 0.0 0.0 - 0.0 /100 WBCs    RBC 6.11 (H) 4.50 - 5.90 x10*6/uL    Hemoglobin 16.0 13.5 - 17.5 g/dL    Hematocrit 47.4 41.0 - 52.0 %    MCV 78 (L) 80 - 100 fL    MCH 26.2 26.0 - 34.0 pg    MCHC 33.8 32.0 - 36.0 g/dL    RDW 18.3 (H) 11.5 - 14.5 %    Platelets 433 150 - 450 x10*3/uL    Neutrophils % 66.4 40.0 - 80.0 %    Immature Granulocytes %, Automated 0.3 0.0 - 0.9 %    Lymphocytes % 22.0 13.0 - 44.0 %    Monocytes % 8.6 2.0 - 10.0 %    Eosinophils % 1.9 0.0 - 6.0 %    Basophils % 0.8 0.0 - 2.0 %    Neutrophils Absolute 8.86 (H) 1.20 - 7.70 x10*3/uL    Immature Granulocytes Absolute, Automated 0.04 0.00 - 0.70 x10*3/uL    Lymphocytes Absolute 2.93 1.20 - 4.80 x10*3/uL    Monocytes Absolute 1.15 (H) 0.10 - 1.00 x10*3/uL    Eosinophils Absolute 0.25 0.00 - 0.70 x10*3/uL     Basophils Absolute 0.10 0.00 - 0.10 x10*3/uL   Magnesium   Result Value Ref Range    Magnesium 1.80 1.60 - 3.10 mg/dL   Phosphorus   Result Value Ref Range    Phosphorus 4.1 2.5 - 4.5 mg/dL   POCT GLUCOSE   Result Value Ref Range    POCT Glucose 179 (H) 74 - 99 mg/dL     Assessment/Plan:     I am currently managing this critically ill patient for the following problems:     Neuro/Psych/Pain Ctrl/Sedation:  #ETOH abuse  #Marijuana use  -Patient states he drinks 2 beers/day   -Neuro checks per protocol  -Patient is A&Ox's 3  -CIWA without medication for now  -Monitor CAM-ICU     Respiratory/ENT:  #Asthma  #Nicotine dependence   -Patient smokes 1 ppd  -CXR no acute process  -Currently on RA  -Continuous pulse oximetry, maintain SPO2>92%  -Aggressive pulmonary toileting/bronchial hygiene  -Encourage IS  -Nicotine patch ordered  -Duonebs Q6 PRN  -OOB to chair     Cardiovascular:  #SVT  #HFrEF  #HTN  -ECHO 6/2020:  Normal left ventricular systolic function. Estimated ejection  fraction is about 55-60%. Mild TR. Mild left ventricular hypertrophy seen    -ECHO 5/21/2024:  1. Left ventricular systolic function is moderately decreased with a 35% estimated ejection fraction.   2. Trace mitral valve regurgitation.   3. Trace tricuspid regurgitation is visualized.   4. There is global hypokinesis of the left ventricle with minor regional variations.  -ProBNP on admission 582  -Continuous cardiac monitoring  -Maintain goal MAP>65  -Cardiology consulted today for new heart failure  -EP consulted, patient will need to follow up outpatient.  Declined ablation, wants medical management for now  -Increase to  metoprolol 50 mg BID   -Increase hydrochlorothiazide to 50 mg daily  -Monitor and optimize electrolytes, keep K>4, Mg>2     GI:  #Transaminitis resolving  -Diet: Regular  -Daily CMP  -GI prophylaxis: Not indicated  -Bowel regimen: none     Renal/Volume Status (Intra & Extravascular):  -Renal function: 12/1.0  -Daily RFP, Mg,  Phos  -Replace electrolytes PRN     Endocrine  #Hyperglycemia resolving  -HA1C 5.7  -Monitor blood glucose daily and PRN  -Goal BS<180  -Hypoglycemic protocol  -TSH WNL     Infectious Disease:  #Leukocytosis improving  -Afebrile  -WBC 15.5>14.4>13.3  -UA negative     Heme/Onc:  -HH: WNL  -Daily CBC  -Coags WNL  -VTE prophylaxis: lovenox     Derm/MSK:  -ICU skin protocol     Ethics/Code Status:  -Full code     Lines/Larson/Restranits:  CVC: no  Camila: no  Larson: no  Restraints: no     Dispo: tx to stepdown     Plan discussed with: Dr Brink  Critical Care Time:  30 minutes      MAEGAN Underwood-CNP  Pulmonary and Critical Care Medicine

## 2024-05-23 NOTE — CARE PLAN
The patient's goals for the shift include normal BP and HR    The clinical goals for the shift include Control and stablilize BP, transfer to SDU.      Problem: Pain - Adult  Goal: Verbalizes/displays adequate comfort level or baseline comfort level  Outcome: Progressing     Problem: Safety - Adult  Goal: Free from fall injury  Outcome: Progressing     Problem: Discharge Planning  Goal: Discharge to home or other facility with appropriate resources  Outcome: Progressing     Problem: Chronic Conditions and Co-morbidities  Goal: Patient's chronic conditions and co-morbidity symptoms are monitored and maintained or improved  Outcome: Progressing     Problem: Respiratory  Goal: Clear secretions with interventions this shift  Outcome: Progressing  Goal: Minimize anxiety/maximize coping throughout shift  Outcome: Progressing  Goal: Minimal/no exertional discomfort or dyspnea this shift  Outcome: Progressing     Problem: Skin  Goal: Decreased wound size/increased tissue granulation at next dressing change  Outcome: Progressing  Flowsheets (Taken 5/23/2024 0819)  Decreased wound size/increased tissue granulation at next dressing change:   Protective dressings over bony prominences   Promote sleep for wound healing  Goal: Participates in plan/prevention/treatment measures  Outcome: Progressing  Flowsheets (Taken 5/23/2024 0819)  Participates in plan/prevention/treatment measures:   Discuss with provider PT/OT consult   Increase activity/out of bed for meals  Goal: Prevent/manage excess moisture  Outcome: Progressing  Flowsheets (Taken 5/23/2024 0819)  Prevent/manage excess moisture:   Follow provider orders for dressing changes   Monitor for/manage infection if present  Goal: Prevent/minimize sheer/friction injuries  Outcome: Progressing  Flowsheets (Taken 5/23/2024 0819)  Prevent/minimize sheer/friction injuries:   Increase activity/out of bed for meals   HOB 30 degrees or less   Turn/reposition every 2 hours/use  positioning/transfer devices  Goal: Promote/optimize nutrition  Outcome: Progressing  Flowsheets (Taken 5/23/2024 0819)  Promote/optimize nutrition:   Offer water/supplements/favorite foods   Consume > 50% meals/supplements   Monitor/record intake including meals  Goal: Promote skin healing  Outcome: Progressing  Flowsheets (Taken 5/23/2024 0819)  Promote skin healing:   Turn/reposition every 2 hours/use positioning/transfer devices   Rotate device position/do not position patient on device     Problem: Pain  Goal: My pain/discomfort is manageable  Outcome: Progressing  Flowsheets (Taken 5/23/2024 0819)  Resident's pain/discomfort is manageable:   Administer pain medication prior to activities that may trigger pain   Identify and avoid pain triggers     Problem: Safety  Goal: Patient will be injury free during hospitalization  Outcome: Progressing  Goal: I will remain free of falls  Outcome: Progressing     Problem: Daily Care  Goal: Daily care needs are met  Outcome: Progressing     Problem: Psychosocial Needs  Goal: Demonstrates ability to cope with hospitalization/illness  Outcome: Progressing  Goal: Collaborate with me, my family, and caregiver to identify my specific goals  Outcome: Progressing     Problem: Discharge Barriers  Goal: My discharge needs are met  Outcome: Progressing

## 2024-05-23 NOTE — NURSING NOTE
Assumed care of patient.  He is awake and alert.  NAD.  VSS.  His BP is a little high.  Judy is here to see patient.  She will transfer him out to SDU.

## 2024-05-23 NOTE — CARE PLAN
The patient's goals for the shift include normal BP and HR    The clinical goals for the shift include Control and stablilize BP      Problem: Pain - Adult  Goal: Verbalizes/displays adequate comfort level or baseline comfort level  Outcome: Progressing     Problem: Safety - Adult  Goal: Free from fall injury  Outcome: Progressing     Problem: Discharge Planning  Goal: Discharge to home or other facility with appropriate resources  Outcome: Progressing     Problem: Chronic Conditions and Co-morbidities  Goal: Patient's chronic conditions and co-morbidity symptoms are monitored and maintained or improved  Outcome: Progressing     Problem: Respiratory  Goal: Clear secretions with interventions this shift  Outcome: Progressing  Goal: Minimize anxiety/maximize coping throughout shift  Outcome: Progressing  Goal: Minimal/no exertional discomfort or dyspnea this shift  Outcome: Progressing     Problem: Hypertensive Emergency/Crisis  Goal: Blood pressure gradually reduced to goal range  Outcome: Progressing  Goal: Lab values return to normal range  Outcome: Progressing  Goal: Promote self management  Outcome: Progressing     Problem: Skin  Goal: Decreased wound size/increased tissue granulation at next dressing change  Outcome: Progressing  Flowsheets (Taken 5/22/2024 2134)  Decreased wound size/increased tissue granulation at next dressing change:   Protective dressings over bony prominences   Utilize specialty bed per algorithm  Goal: Participates in plan/prevention/treatment measures  Outcome: Progressing  Flowsheets (Taken 5/22/2024 2134)  Participates in plan/prevention/treatment measures: Increase activity/out of bed for meals  Goal: Prevent/manage excess moisture  Outcome: Progressing  Flowsheets (Taken 5/22/2024 2134)  Prevent/manage excess moisture:   Cleanse incontinence/protect with barrier cream   Moisturize dry skin  Goal: Prevent/minimize sheer/friction injuries  Outcome: Progressing  Flowsheets (Taken  5/22/2024 2134)  Prevent/minimize sheer/friction injuries:   HOB 30 degrees or less   Increase activity/out of bed for meals   Turn/reposition every 2 hours/use positioning/transfer devices   Use pull sheet   Utilize specialty bed per algorithm  Goal: Promote/optimize nutrition  Outcome: Progressing  Flowsheets (Taken 5/22/2024 2134)  Promote/optimize nutrition:   Consume > 50% meals/supplements   Monitor/record intake including meals   Offer water/supplements/favorite foods  Goal: Promote skin healing  Outcome: Progressing  Flowsheets (Taken 5/22/2024 2134)  Promote skin healing:   Assess skin/pad under line(s)/device(s)   Protective dressings over bony prominences   Turn/reposition every 2 hours/use positioning/transfer devices     Problem: Pain  Goal: My pain/discomfort is manageable  Outcome: Progressing     Problem: Safety  Goal: Patient will be injury free during hospitalization  Outcome: Progressing  Goal: I will remain free of falls  Outcome: Progressing     Problem: Daily Care  Goal: Daily care needs are met  Outcome: Progressing     Problem: Psychosocial Needs  Goal: Demonstrates ability to cope with hospitalization/illness  Outcome: Progressing  Goal: Collaborate with me, my family, and caregiver to identify my specific goals  Outcome: Progressing     Problem: Discharge Barriers  Goal: My discharge needs are met  Outcome: Progressing

## 2024-05-23 NOTE — PROGRESS NOTES
Charles Diaz is a 53 y.o. male on day 2 of admission presenting with SVT (supraventricular tachycardia) (CMS-[facility]).      Subjective   History of hypertension.  Stopped taking his meds.  Came in with SVT and hypertensive urgency.  He has been seen by electrophysiology.  He has been put on metoprolol and hydrochlorothiazide.  An echocardiogram showed a EF of 35%.  He is feeling quite well.  He was in the ICU because of his high blood pressure but he has been transferred to me today.  Blood pressure numbers are acceptable although not optimal he probably needs his meds adjusted to account for the low ejection fraction.    Objective alert oriented undistressed  Heart regular rate and rhythm  Lungs clear to auscultation  Abdomen soft nontender nondistended  Extremities no significant edema    Last Recorded Vitals  BP (!) 148/93 (BP Location: Left arm, Patient Position: Lying)   Pulse 71   Temp 37 °C (98.6 °F) (Temporal)   Resp 19   Wt 73.2 kg (161 lb 6 oz)   SpO2 97%   Intake/Output last 3 Shifts:    Intake/Output Summary (Last 24 hours) at 5/23/2024 1639  Last data filed at 5/23/2024 0620  Gross per 24 hour   Intake --   Output 1675 ml   Net -1675 ml       Admission Weight  Weight: 76 kg (167 lb 8.8 oz) (05/21/24 0515)    Daily Weight  05/23/24 : 73.2 kg (161 lb 6 oz)    Image Results  Transthoracic Echo (TTE) [place] [address]             Phone [phone]    TRANSTHORACIC ECHOCARDIOGRAM REPORT       Patient Name:      CHARLES DIAZ       Reading Physician:    [id number] Manish Mtz MD  Study Date:        5/21/2024             Ordering Provider:    [id number] CHAPARRO DÍAZ  MRN/PID:           [id number]              Fellow:  Accession#:        [id number]          Nurse:  Date of Birth/Age: 1970 / 53 years   Sonographer:          Lincoln Rayo RDCS  Gender:            M                     Additional Staff:  Height:            180.00 cm             Admit Date:  Weight:            75.00 kg              Admission Status:     Inpatient -                                                                 Routine  BSA / BMI:         1.94 m2 / 23.15 kg/m2 Department Location:  [facility]  Blood Pressure: 149 /102 mmHg    Study Type:    TRANSTHORACIC ECHO (TTE) COMPLETE  Diagnosis/ICD: Abnormal electrocardiogram [ECG] [EKG]-R94.31  Indication:    Abn Ekg / SVT  CPT Codes:     Echo Complete w Full Doppler-[id number]    Patient History:  Pertinent History: A-Fib, CHF, CAD, HTN, Hyperlipidemia and Murmur.    Study Detail: The following Echo studies were performed: 2D, M-Mode, Doppler and                color flow.       PHYSICIAN INTERPRETATION:  Left Ventricle: Left ventricular systolic function is moderately decreased, with an estimated ejection fraction of 35%. There is global hypokinesis of the left ventricle with minor regional variations. The left ventricular cavity size is normal. Spectral Doppler shows a normal pattern of left ventricular diastolic filling.  Left Atrium: The left atrium is normal in size.  Right Ventricle: The right ventricle is normal in size. There is normal right ventricular global systolic function.  Right Atrium: The right atrium is normal in size.  Aortic Valve: The aortic valve is trileaflet. There is no evidence of aortic valve regurgitation. The peak instantaneous gradient of the aortic valve is 3.1 mmHg. The mean gradient of the aortic valve is 1.7 mmHg.  Mitral Valve: The mitral valve is normal in structure. There is trace mitral valve regurgitation.  Tricuspid Valve: The tricuspid valve is structurally normal. There is trace tricuspid regurgitation.  Pulmonic Valve: The pulmonic valve is not well visualized. The pulmonic valve  regurgitation was not well visualized.  Pericardium: There is no pericardial effusion noted.  Aorta: The aortic root is normal.       CONCLUSIONS:   1. Left ventricular systolic function is moderately decreased with a 35% estimated ejection fraction.   2. Trace mitral valve regurgitation.   3. Trace tricuspid regurgitation is visualized.   4. There is global hypokinesis of the left ventricle with minor regional variations.    QUANTITATIVE DATA SUMMARY:  2D MEASUREMENTS:                           Normal Ranges:  LAs:           3.49 cm   (2.7-4.0cm)  IVSd:          0.99 cm   (0.6-1.1cm)  LVPWd:         1.10 cm   (0.6-1.1cm)  LVIDd:         4.93 cm   (3.9-5.9cm)  LVIDs:         4.22 cm  LV Mass Index: 96.9 g/m2  LV % FS        14.6 %    LA VOLUME:                               Normal Ranges:  LA Vol A4C:       79.5 ml    (22+/-6mL/m2)  LA Vol A2C:       61.2 ml  LA Vol BP:        72.7 ml  LA Vol Index A4C: 40.9 ml/m2  LA Vol Index A2C: 31.5 ml/m2  LA Vol Index BP:  37.4 ml/m2  LA Volume Index:  38.0 ml/m2  LA Vol A4C:       74.3 ml  LA Vol A2C:       56.7 ml    RA VOLUME BY A/L METHOD:                        Normal Ranges:  RA Area A4C: 20.0 cm2    LV SYSTOLIC FUNCTION BY 2D PLANIMETRY (MOD):                      Normal Ranges:  EF-A4C View: 28.7 % (>=55%)  EF-A2C View: 36.6 %  EF-Biplane:  33.5 %    LV DIASTOLIC FUNCTION:                         Normal Ranges:  MV Peak E:    0.46 m/s (0.7-1.2 m/s)  MV Peak A:    0.43 m/s (0.42-0.7 m/s)  E/A Ratio:    1.09     (1.0-2.2)  MV e'         0.06 m/s (>8.0)  MV lateral e' 0.07 m/s  MV medial e'  0.06 m/s  E/e' Ratio:   7.15     (<8.0)    MITRAL VALVE:                  Normal Ranges:  MV DT: 156 msec (150-240msec)    AORTIC VALVE:                                    Normal Ranges:  AoV Vmax:                0.88 m/s (<=1.7m/s)  AoV Peak PG:             3.1 mmHg (<20mmHg)  AoV Mean P.7 mmHg (1.7-11.5mmHg)  LVOT Max Issa:            0.84 m/s (<=1.1m/s)  AoV VTI:                  15.84 cm (18-25cm)  LVOT VTI:                15.43 cm  LVOT Diameter:           2.14 cm  (1.8-2.4cm)  AoV Area, VTI:           3.51 cm2 (2.5-5.5cm2)  AoV Area,Vmax:           3.47 cm2 (2.5-4.5cm2)  AoV Dimensionless Index: 0.97       RIGHT VENTRICLE:  RV Basal 4.24 cm  RV Mid   2.90 cm  RV Major 7.3 cm  RV s'    0.08 m/s    TRICUSPID VALVE/RVSP:                              Normal Ranges:  Peak TR Velocity: 2.78 m/s  RV Syst Pressure: 38.9 mmHg (< 30mmHg)  IVC Diam:         2.14 cm    PULMONIC VALVE:                       Normal Ranges:  PV Max Issa: 0.6 m/s  (0.6-0.9m/s)  PV Max P.3 mmHg  PV Mean P.7 mmHg  PV VTI:     10.49 cm       [id number] Manish Mtz MD  Electronically signed on 2024 at 6:03:32 PM       ** Final **        Echo shows EF of 35%.  Normal kidney function    Assessment/Plan   SVT, cardiomyopathy, hypertension    He is currently on metoprolol after being seen by EP.  He is on 50 mg of hydrochlorothiazide.  He does not have any fluid overload and I think 50 mg of hydrochlorothiazide is probably unnecessary.  I am lowering the dose..    He should be on something for his reduced ejection fraction along the lines of lisinopril Entresto or losartan..  ICU team consulted Dr. Sadler.  I want him to help optimize the cardiac regimen before this patient goes home.  I explained to the patient that his drinking is excessive.  He has 2  24 ounce beers every day.  I think that may be contributing to his cardiomyopathy.    Quinn Odonnell MD

## 2024-05-23 NOTE — PROGRESS NOTES
Patient not medically clear. Blood pressures were elevated. At the time of discharge, patient will return home with no skilled services. Will follow.      05/23/24 1331   Discharge Planning   Home or Post Acute Services None   Patient expects to be discharged to: Home   Does the patient need discharge transport arranged? No

## 2024-05-24 ENCOUNTER — PHARMACY VISIT (OUTPATIENT)
Dept: PHARMACY | Facility: CLINIC | Age: 54
End: 2024-05-24
Payer: COMMERCIAL

## 2024-05-24 VITALS
HEART RATE: 71 BPM | TEMPERATURE: 96.6 F | WEIGHT: 151.01 LBS | RESPIRATION RATE: 18 BRPM | BODY MASS INDEX: 21.14 KG/M2 | HEIGHT: 71 IN | SYSTOLIC BLOOD PRESSURE: 135 MMHG | DIASTOLIC BLOOD PRESSURE: 96 MMHG | OXYGEN SATURATION: 96 %

## 2024-05-24 PROBLEM — I47.10 SVT (SUPRAVENTRICULAR TACHYCARDIA) (CMS-HCC): Status: RESOLVED | Noted: 2024-05-21 | Resolved: 2024-05-24

## 2024-05-24 LAB
ANION GAP SERPL CALC-SCNC: 15 MMOL/L
BASOPHILS # BLD AUTO: 0.11 X10*3/UL (ref 0–0.1)
BASOPHILS NFR BLD AUTO: 0.8 %
BUN SERPL-MCNC: 19 MG/DL (ref 8–25)
CALCIUM SERPL-MCNC: 9.7 MG/DL (ref 8.5–10.4)
CHLORIDE SERPL-SCNC: 96 MMOL/L (ref 97–107)
CO2 SERPL-SCNC: 23 MMOL/L (ref 24–31)
CREAT SERPL-MCNC: 1.1 MG/DL (ref 0.4–1.6)
EGFRCR SERPLBLD CKD-EPI 2021: 80 ML/MIN/1.73M*2
EOSINOPHIL # BLD AUTO: 0.25 X10*3/UL (ref 0–0.7)
EOSINOPHIL NFR BLD AUTO: 1.8 %
ERYTHROCYTE [DISTWIDTH] IN BLOOD BY AUTOMATED COUNT: 18.4 % (ref 11.5–14.5)
GLUCOSE SERPL-MCNC: 101 MG/DL (ref 65–99)
HCT VFR BLD AUTO: 49.7 % (ref 41–52)
HGB BLD-MCNC: 16.9 G/DL (ref 13.5–17.5)
IMM GRANULOCYTES # BLD AUTO: 0.09 X10*3/UL (ref 0–0.7)
IMM GRANULOCYTES NFR BLD AUTO: 0.6 % (ref 0–0.9)
LYMPHOCYTES # BLD AUTO: 3.05 X10*3/UL (ref 1.2–4.8)
LYMPHOCYTES NFR BLD AUTO: 21.7 %
MAGNESIUM SERPL-MCNC: 1.8 MG/DL (ref 1.6–3.1)
MCH RBC QN AUTO: 26.6 PG (ref 26–34)
MCHC RBC AUTO-ENTMCNC: 34 G/DL (ref 32–36)
MCV RBC AUTO: 78 FL (ref 80–100)
MONOCYTES # BLD AUTO: 1.37 X10*3/UL (ref 0.1–1)
MONOCYTES NFR BLD AUTO: 9.8 %
NEUTROPHILS # BLD AUTO: 9.18 X10*3/UL (ref 1.2–7.7)
NEUTROPHILS NFR BLD AUTO: 65.3 %
NRBC BLD-RTO: 0 /100 WBCS (ref 0–0)
PHOSPHATE SERPL-MCNC: 4.7 MG/DL (ref 2.5–4.5)
PLATELET # BLD AUTO: 450 X10*3/UL (ref 150–450)
POTASSIUM SERPL-SCNC: 4.3 MMOL/L (ref 3.4–5.1)
RBC # BLD AUTO: 6.35 X10*6/UL (ref 4.5–5.9)
SODIUM SERPL-SCNC: 134 MMOL/L (ref 133–145)
WBC # BLD AUTO: 14.1 X10*3/UL (ref 4.4–11.3)

## 2024-05-24 PROCEDURE — 2500000005 HC RX 250 GENERAL PHARMACY W/O HCPCS

## 2024-05-24 PROCEDURE — 2500000002 HC RX 250 W HCPCS SELF ADMINISTERED DRUGS (ALT 637 FOR MEDICARE OP, ALT 636 FOR OP/ED): Performed by: INTERNAL MEDICINE

## 2024-05-24 PROCEDURE — 2500000001 HC RX 250 WO HCPCS SELF ADMINISTERED DRUGS (ALT 637 FOR MEDICARE OP): Performed by: INTERNAL MEDICINE

## 2024-05-24 PROCEDURE — 84100 ASSAY OF PHOSPHORUS: CPT

## 2024-05-24 PROCEDURE — 2500000001 HC RX 250 WO HCPCS SELF ADMINISTERED DRUGS (ALT 637 FOR MEDICARE OP)

## 2024-05-24 PROCEDURE — S4991 NICOTINE PATCH NONLEGEND: HCPCS

## 2024-05-24 PROCEDURE — RXMED WILLOW AMBULATORY MEDICATION CHARGE

## 2024-05-24 PROCEDURE — 85025 COMPLETE CBC W/AUTO DIFF WBC: CPT

## 2024-05-24 PROCEDURE — 2500000002 HC RX 250 W HCPCS SELF ADMINISTERED DRUGS (ALT 637 FOR MEDICARE OP, ALT 636 FOR OP/ED)

## 2024-05-24 PROCEDURE — 83735 ASSAY OF MAGNESIUM: CPT

## 2024-05-24 PROCEDURE — 94640 AIRWAY INHALATION TREATMENT: CPT

## 2024-05-24 PROCEDURE — 9420000001 HC RT PATIENT EDUCATION 5 MIN

## 2024-05-24 PROCEDURE — 2500000004 HC RX 250 GENERAL PHARMACY W/ HCPCS (ALT 636 FOR OP/ED)

## 2024-05-24 PROCEDURE — 36415 COLL VENOUS BLD VENIPUNCTURE: CPT

## 2024-05-24 PROCEDURE — 80048 BASIC METABOLIC PNL TOTAL CA: CPT

## 2024-05-24 RX ORDER — CARVEDILOL 6.25 MG/1
6.25 TABLET ORAL 2 TIMES DAILY
Qty: 60 TABLET | Refills: 2 | Status: SHIPPED | OUTPATIENT
Start: 2024-05-24

## 2024-05-24 RX ORDER — SPIRONOLACTONE 25 MG/1
12.5 TABLET ORAL DAILY
Qty: 30 TABLET | Refills: 2 | Status: SHIPPED | OUTPATIENT
Start: 2024-05-25

## 2024-05-24 RX ORDER — LANOLIN ALCOHOL/MO/W.PET/CERES
100 CREAM (GRAM) TOPICAL DAILY
Start: 2024-05-25

## 2024-05-24 RX ORDER — SACUBITRIL AND VALSARTAN 24; 26 MG/1; MG/1
TABLET, FILM COATED ORAL
Qty: 60 TABLET | Refills: 0 | OUTPATIENT
Start: 2024-05-24

## 2024-05-24 RX ORDER — ACETAMINOPHEN 325 MG/1
650 TABLET ORAL EVERY 6 HOURS PRN
Start: 2024-05-24

## 2024-05-24 RX ORDER — HYDROCODONE BITARTRATE AND ACETAMINOPHEN 5; 325 MG/1; MG/1
1 TABLET ORAL ONCE
Status: COMPLETED | OUTPATIENT
Start: 2024-05-24 | End: 2024-05-24

## 2024-05-24 RX ORDER — ONDANSETRON 4 MG/1
4 TABLET, ORALLY DISINTEGRATING ORAL EVERY 8 HOURS PRN
Start: 2024-05-24

## 2024-05-24 RX ORDER — SPIRONOLACTONE 25 MG/1
12.5 TABLET ORAL DAILY
Status: DISCONTINUED | OUTPATIENT
Start: 2024-05-24 | End: 2024-05-24 | Stop reason: HOSPADM

## 2024-05-24 RX ORDER — CARVEDILOL 6.25 MG/1
6.25 TABLET ORAL 2 TIMES DAILY
Status: DISCONTINUED | OUTPATIENT
Start: 2024-05-24 | End: 2024-05-24 | Stop reason: HOSPADM

## 2024-05-24 RX ADMIN — IPRATROPIUM BROMIDE AND ALBUTEROL SULFATE 3 ML: 2.5; .5 SOLUTION RESPIRATORY (INHALATION) at 07:23

## 2024-05-24 RX ADMIN — NICOTINE 1 PATCH: 21 PATCH, EXTENDED RELEASE TRANSDERMAL at 09:04

## 2024-05-24 RX ADMIN — CARVEDILOL 6.25 MG: 6.25 TABLET, FILM COATED ORAL at 09:04

## 2024-05-24 RX ADMIN — HYDROCHLOROTHIAZIDE 25 MG: 25 TABLET ORAL at 09:05

## 2024-05-24 RX ADMIN — SPIRONOLACTONE 12.5 MG: 25 TABLET ORAL at 09:05

## 2024-05-24 RX ADMIN — HYDROCODONE BITARTRATE AND ACETAMINOPHEN 1 TABLET: 5; 325 TABLET ORAL at 09:04

## 2024-05-24 RX ADMIN — Medication 1 TABLET: at 09:00

## 2024-05-24 RX ADMIN — SACUBITRIL AND VALSARTAN 1 TABLET: 24; 26 TABLET, FILM COATED ORAL at 09:04

## 2024-05-24 RX ADMIN — Medication 100 MG: at 09:05

## 2024-05-24 RX ADMIN — ENOXAPARIN SODIUM 40 MG: 40 INJECTION SUBCUTANEOUS at 09:05

## 2024-05-24 RX ADMIN — FOLIC ACID 1 MG: 1 TABLET ORAL at 09:04

## 2024-05-24 RX ADMIN — Medication 21 PERCENT: at 08:00

## 2024-05-24 ASSESSMENT — PAIN - FUNCTIONAL ASSESSMENT
PAIN_FUNCTIONAL_ASSESSMENT: 0-10
PAIN_FUNCTIONAL_ASSESSMENT: 0-10

## 2024-05-24 ASSESSMENT — PAIN SCALES - GENERAL
PAINLEVEL_OUTOF10: 6
PAINLEVEL_OUTOF10: 0 - NO PAIN

## 2024-05-24 NOTE — CONSULTS
Inpatient consult to Cardiology  Consult performed by: Manish Mtz MD  Consult ordered by: MAEGAN Underwood-CNP  Reason for consult: svt        History Of Present Illness:    [place] is a 53 y.o. male presenting with asthma and HTN. He presented to ED today with SVT with HR around 200 bpm after waking up around 3AM with palpitations and fatigue. In the ED, patient was given 1 liter fluid bolus, adenosine x2 and cardioverted x2 without success. Amiodarone bolus and gtt were given. He was transferred to the ICU still in SVT. In the ICU, the patient received metoprolol IV.  Patient informs me that he was diagnosed hypertension approximately 4 years ago when he was seen in urgent care [facility].  Given and evidence of medication at the time which he took it onto the prescription ran out but has not followed with any primary care physician.  He admits that he does not take any medications at this time.  He lives independently.  He is not .  He has grownup children.  He admits that he drinks to 24 ounces of beers on daily basis and does do some fireball's.  He also admits smoking marijuana and occasionally [place] cigars.  He is employed and works in septic tanks.  He lives independently in [place].  Last Recorded Vitals:  Vitals:    05/24/24 0509 05/24/24 0600 05/24/24 0724 05/24/24 0758   BP: (!) 136/104   128/87   BP Location: Left arm   Left arm   Patient Position: Lying   Lying   Pulse:       Resp: 16   18   Temp: 36.9 °C (98.4 °F)   36.3 °C (97.3 °F)   TempSrc: Temporal   Temporal   SpO2: 96%  97% 95%   Weight: 68.5 kg (151 lb 0.2 oz) 68.5 kg (151 lb 0.2 oz)     Height:           Last Labs:  CBC - 5/24/2024:  4:55 AM  14.1 16.9 450    49.7      CMP - 5/24/2024:  4:55 AM  9.7 7.0 23 --- 0.6   4.7 4.0 41 86      PTT - 5/22/2024:  4:30 AM  1.0   10.8 26.5     Hemoglobin A1C   Date/Time Value Ref Range Status   05/21/2024 05:26 AM 5.7 (H) See below % Final     LDL  "Calculated   Date/Time Value Ref Range Status   06/25/2020 07:53 PM 84 65 - 130 MG/DL Final      Last I/O:  I/O last 3 completed shifts:  In: - (0 mL/kg)   Out: 1675 (24.5 mL/kg) [Urine:1675 (0.7 mL/kg/hr)]  Weight: 68.5 kg     Past Cardiology Tests (Last 3 Years):  EKG:  No results found for this or any previous visit from the past 1095 days.    Echo:  Transthoracic Echo (TTE) Complete 05/21/2024    Ejection Fractions:  No results found for: \"EF\"  Cath:  No results found for this or any previous visit from the past 1095 days.    Stress Test:  No results found for this or any previous visit from the past 1095 days.    Cardiac Imaging:  No results found for this or any previous visit from the past 1095 days.      Past Medical History:  He has a past medical history of Hypertension.    Past Surgical History:  He has no past surgical history on file.      Social History:  He reports that he has been smoking cigarettes. He started smoking about 34 years ago. He has a 68.8 pack-year smoking history. He does not have any smokeless tobacco history on file. He reports that he does not currently use drugs. No history on file for alcohol use.    Family History:  No family history on file.     Allergies:  Shellfish derived    Inpatient Medications:  Scheduled medications   Medication Dose Route Frequency    enoxaparin  40 mg subcutaneous q24h ETHAN    folic acid  1 mg oral Daily    hydroCHLOROthiazide  25 mg oral Daily    HYDROcodone-acetaminophen  1 tablet oral Once    lidocaine  1 patch transdermal Daily    metoprolol tartrate  50 mg oral BID    multivitamin with minerals  1 tablet oral Daily    nicotine  1 patch transdermal Daily    Followed by    [START ON 7/2/2024] nicotine  1 patch transdermal Daily    Followed by    [START ON 7/16/2024] nicotine  1 patch transdermal Daily    oxygen   inhalation Continuous - Inhalation    thiamine  100 mg oral Daily     PRN medications   Medication    acetaminophen    dextrose    dextrose "    glucagon    glucagon    hydrALAZINE    ipratropium-albuteroL    labetaloL    ondansetron ODT    Or    ondansetron     Continuous Medications   Medication Dose Last Rate     Outpatient Medications:  Current Outpatient Medications   Medication Instructions    albuterol 90 mcg/actuation inhaler 1 puff, inhalation, Every 6 hours PRN   Review of systems constitutional, cardiopulmonary, GI,  musculoskeletal neuroendocrine and psych were reviewed and no other pertinent findings were noted.    Physical Exam:  HEENT PERRLA neck is supple lungs clear to auscultation bilaterally with good air entry heart S1-S2 present with no murmurs abdomen soft and nontender extremity shows no evidence of pedal edema neuro is grossly nonfocal     Assessment/Plan   #1 cardiomyopathy.  Patient has no symptoms of angina or anginal equivalent.  I do believe his decreased distal pulses possibly secondary to uncontrolled hypertension.  Will start patient on Entresto and change to short acting metoprolol to Coreg and add spironolactone.  The patient can be discharged home today and we will see him in follow-up within 2 weeks.  2.  History of SVT with no recurrences on low-dose beta-blockers.    Peripheral IV 05/21/24 20 G Left Antecubital (Active)   Site Assessment Clean;Dry;Intact 05/23/24 2100   Dressing Status Clean;Dry;Occlusive 05/23/24 2100   Number of days: 3       Peripheral IV 05/21/24 18 G Distal;Right;Upper Arm (Active)   Site Assessment Clean;Dry;Intact 05/23/24 2100   Dressing Status Clean;Dry;Occlusive 05/23/24 2100   Number of days: 3       Code Status:  Full Code    I spent 30 minutes in the professional and overall care of this patient.        Manish Mtz MD

## 2024-05-24 NOTE — CARE PLAN
Problem: Respiratory  Goal: Clear secretions with interventions this shift  Outcome: Progressing  Goal: Minimize anxiety/maximize coping throughout shift  Outcome: Progressing  Goal: Minimal/no exertional discomfort or dyspnea this shift  Outcome: Progressing       used

## 2024-05-24 NOTE — DISCHARGE SUMMARY
Discharge Diagnosis  SVT (supraventricular tachycardia) (CMS-[facility])    Issues Requiring Follow-Up  Hypertension and cardiomyopathy    Discharge Meds     Your medication list        START taking these medications        Instructions Last Dose Given Next Dose Due   acetaminophen 325 mg tablet  Commonly known as: Tylenol      Take 2 tablets (650 mg) by mouth every 6 hours if needed for moderate pain (4 - 6).       carvedilol 6.25 mg tablet  Commonly known as: Coreg      Take 1 tablet (6.25 mg) by mouth 2 times a day.       ondansetron ODT 4 mg disintegrating tablet  Commonly known as: Zofran-ODT      Take 1 tablet (4 mg) by mouth every 8 hours if needed for nausea.       sacubitriL-valsartan 24-26 mg tablet  Commonly known as: Entresto      Take 1 tablet by mouth 2 times a day.       spironolactone 25 mg tablet  Commonly known as: Aldactone  Start taking on: May 25, 2024      Take 0.5 tablets (12.5 mg) by mouth once daily.       thiamine 100 mg tablet  Commonly known as: Vitamin B-1  Start taking on: May 25, 2024      Take 1 tablet (100 mg) by mouth once daily.              CONTINUE taking these medications        Instructions Last Dose Given Next Dose Due   albuterol 90 mcg/actuation inhaler                     Where to Get Your Medications        These medications were sent to [id number] - [place], [place] - [address] AT [address] & [address], [place] [address]      Phone: [phone]   carvedilol 6.25 mg tablet  sacubitriL-valsartan 24-26 mg tablet  spironolactone 25 mg tablet       Information about where to get these medications is not yet available    Ask your nurse or doctor about these medications  acetaminophen 325 mg tablet  ondansetron ODT 4 mg disintegrating tablet  thiamine 100 mg tablet         Test Results Pending At Discharge  Pending Labs       No current pending labs.            Hospital Course   Came in with heart palpitations.  Admitted to the ICU  because of hypertensive urgency.  While there he was seen by EP who diagnosed him with nonspecific SVT.  They recommended beta-blocker.  Echocardiogram showed EF 35% yesterday was transferred out of the ICU.  At that time he was on metoprolol and hydrochlorothiazide.  He was feeling good.  His blood pressure was well-controlled.  Today he was seen by cardiology who switched his medications to a combination of carvedilol Entresto and spironolactone.  Cardiology said the patient could go home today on this regimen.  Patient wants to go home today rather than stay in the hospital another day to see how this regimen agrees with him.  I have agreed to discharge him today with prescriptions for Entresto carvedilol spironolactone.  I have also put in a requisition for blood work to be done next week with the results to go to Dr. Sadler.  Patient is to follow-up with Dr. Sadler in 1 to 2 weeks.    I spent 35 minutes arranging coordinating documenting this discharge.    Pertinent Physical Exam At Time of Discharge  Physical Exam    Outpatient Follow-Up  No future appointments.      Quinn Odonnell MD

## 2024-05-24 NOTE — CARE PLAN
The patient's goals for the shift include normal BP and HR    The clinical goals for the shift include Manage BP to stay within BP goal    Problem: Pain - Adult  Goal: Verbalizes/displays adequate comfort level or baseline comfort level  Outcome: Progressing     Problem: Safety - Adult  Goal: Free from fall injury  Outcome: Progressing     Problem: Safety - Adult  Goal: Free from fall injury  Outcome: Progressing

## 2024-05-28 ENCOUNTER — PATIENT OUTREACH (OUTPATIENT)
Dept: PRIMARY CARE | Facility: CLINIC | Age: 54
End: 2024-05-28
Payer: COMMERCIAL

## 2024-05-28 LAB
ATRIAL RATE: 197 BPM
Q ONSET: 225 MS
Q ONSET: 229 MS
QRS COUNT: 32 BEATS
QRS COUNT: 34 BEATS
QRS DURATION: 70 MS
QRS DURATION: 80 MS
QT INTERVAL: 210 MS
QT INTERVAL: 236 MS
QTC CALCULATION(BAZETT): 387 MS
QTC CALCULATION(BAZETT): 423 MS
QTC FREDERICIA: 315 MS
QTC FREDERICIA: 348 MS
R AXIS: -4 DEGREES
R AXIS: 22 DEGREES
T AXIS: 31 DEGREES
T AXIS: 34 DEGREES
T OFFSET: 334 MS
T OFFSET: 343 MS
VENTRICULAR RATE: 193 BPM
VENTRICULAR RATE: 204 BPM

## 2024-05-28 NOTE — PROGRESS NOTES
Discharge facility: [facility]  Discharge diagnosis: SVT (supraventricular tachycardia)   Issues Requiring Follow-Up  Hypertension and cardiomyopathy     Admission date: 5/21/24  Discharge date: 5/24/24    PCP Appointment Date:Declines to schedule, reports following up with cardiologist, message to office  Specialist Appointment Date:  none noted in EMR   Hospital Encounter and Summary: Linked    See Discharge assessment below for further details    Medications  Medications reviewed with patient/caregiver?: Yes (5/28/2024  9:12 AM)  Is the patient having any side effects they believe may be caused by any medication additions or changes?: No (5/28/2024  9:12 AM)  Does the patient have all medications ordered at discharge?: Yes (5/28/2024  9:12 AM)  Care Management Interventions: Provided patient education (5/28/2024  9:12 AM)  Prescription Comments: Prescriptions sent for  carvedilol 6.25 mg tablet     sacubitriL-valsartan 24-26 mg tablet       spironolactone 25 mg tablet (5/28/2024  9:12 AM)  Is the patient taking all medications as directed (includes completed medication regime)?: Yes (5/28/2024  9:12 AM)  Care Management Interventions: Provided patient education (5/28/2024  9:12 AM)  Medication Comments: Instructed on new medication of START taking:  acetaminophen (Tylenol)  carvedilol (Coreg)  ondansetron ODT (Zofran-ODT)  sacubitriL-valsartan (Entresto)  spironolactone (Aldactone)  Start taking on: May 25, 2024  thiamine (Vitamin B-1)  Start taking on: May 25, 2024 (5/28/2024  9:12 AM)  Follow Up Tasks: -- (n/a) (5/28/2024  9:12 AM)    Appointments  Does the patient have a primary care provider?: Yes (5/28/2024  9:12 AM)  Care Management Interventions: Educated patient on importance of making appointment; Advised patient to make appointment (5/28/2024  9:12 AM)  Has the patient kept scheduled appointments due by today?: Yes (5/28/2024  9:12 AM)  Care Management Interventions: Advised to schedule  with specialist (Encouraged to schedule with PCP as well as cardiologist) (5/28/2024  9:12 AM)  Follow Up Tasks: -- (n/a) (5/28/2024  9:12 AM)    Self Management  What is the home health agency?: n/a (5/28/2024  9:12 AM)  Has home health visited the patient within 72 hours of discharge?: Not applicable (5/28/2024  9:12 AM)  Home Health Interventions: -- (n/a) (5/28/2024  9:12 AM)  What Durable Medical Equipment (DME) was ordered?: n/a (5/28/2024  9:12 AM)  Has all Durable Medical Equipment (DME) been delivered?: -- (n/a) (5/28/2024  9:12 AM)  DME Interventions: -- (n/a) (5/28/2024  9:12 AM)  Care Management Interventions: Notified PCP/provider (5/28/2024  9:12 AM)  Follow Up Tasks: -- (n/a) (5/28/2024  9:12 AM)    Patient Teaching  Does the patient have access to their discharge instructions?: Yes (5/28/2024  9:12 AM)  Care Management Interventions: Reviewed instructions with patient (5/28/2024  9:12 AM)  What is the patient's perception of their health status since discharge?: Improving (5/28/2024  9:12 AM)  Is the patient/caregiver able to teach back the hierarchy of who to call/visit for symptoms/problems? PCP, Specialist, Home Health nurse, Urgent Care, ED, 911: Yes (5/28/2024  9:12 AM)  Patient/Caregiver Education Comments: Instructed on hospital discharge instructions. Instructed on new and changed medications. Instructed if increased shortness of breath or chest pains to call 911. Provided my contact information and encouraged to call with any questions. (5/28/2024  9:12 AM)    Wrap Up  Is the patient/caregiver familiar with Advance Care Planning?: Yes (5/28/2024  9:12 AM)  Would the patient like more information on Advance Care Planning?: No (5/28/2024  9:12 AM)

## 2024-06-03 ENCOUNTER — DOCUMENTATION (OUTPATIENT)
Dept: CASE MANAGEMENT | Facility: HOSPITAL | Age: 54
End: 2024-06-03
Payer: COMMERCIAL

## 2024-06-03 NOTE — PROGRESS NOTES
Patient previously admitted to [facility] for SVT.   Cardiology hospital follow-up appointment not scheduled.  Reached out to patient to follow-up on scheduling cardiology appointment and potential appointment with HF provider as patient appeared on HF quality list. EF 35%    No answer. Left VM to return call to schedule.    Jamar SUAZON, RN  Clinical Nurse Navigator- CHF  [phone]

## 2024-06-04 NOTE — DOCUMENTATION CLARIFICATION NOTE
"    PATIENT:               CHARLES DIAZ  ACCT #:                  [id number]  MRN:                       [id number]  :                       1970  ADMIT DATE:       2024 4:55 AM  DISCH DATE:        2024 2:30 PM  RESPONDING PROVIDER #:        [id number]          PROVIDER RESPONSE TEXT:    Acute Systolic Congestive Heart Failure    CDI QUERY TEXT:    Clarification        Instruction:    Based on your assessment of the patient and the clinical information, please provide the requested documentation by clicking on the appropriate radio button and enter any additional information if prompted.    Question: Please further clarify the type and acuity of congestive heart failure      When answering this query, please exercise your independent professional judgment. The fact that a question is being asked, does not imply that any particular answer is desired or expected.    The patient's clinical indicators include:  Clinical Information:  Charles Diaz is a 53 y.o. year old male patient with Past Medical History of asthma and HTN.  The patient states he has been prescribed medication for HTN but stopped taking them about 3 months ago. He presented to ED today after waking up around 3am with heart palpitations.  Associated symptoms were nausea and weakness. I    Clinical Indicators:    BNP:  582   TTE:  1.Left ventricular systolic function is moderately decreased with a 35% estimated ejection fraction.  2.Trace mitral valve regurgitation.  3.Trace tricuspid regurgitation is visualized.  4.There is global hypokinesis of the left ventricle with minor regional variations.  EKG:  SVT HR   H/P:  \"-ECHO 2020:  Normal left ventricular systolic function.\"     Critical Care Medicine:  \"#HFrEF\"    Treatment:  Increase HCTZ to 50 mg Daily;    Risk Factors: Untreated HTN; ETOH; Smoker;  Options provided:  -- Acute Systolic Congestive Heart Failure  -- Acute on Chronic Systolic Congestive Heart Failure  -- " Chronic Systolic Congestive Heart Failure  -- Other - I will add my own diagnosis  -- Refer to Clinical Documentation Reviewer    Query created by: Chayito Roberts on 5/29/2024 1:06 PM      Electronically signed by:  ERIC GU MD 6/4/2024 1:00 PM

## 2024-06-21 ENCOUNTER — TELEPHONE (OUTPATIENT)
Dept: PRIMARY CARE | Facility: CLINIC | Age: 54
End: 2024-06-21
Payer: COMMERCIAL

## 2024-06-21 NOTE — TELEPHONE ENCOUNTER
----- Message from Rachael Wright MD sent at 6/21/2024  1:23 PM EDT -----  Regarding: FW: Lung cancer screening program  Offer this pt an order for LOW DOSE LUNG SCREENING and IF they wish this set up at least a TELE VISIT and preferably an IN OFFICE HOSP FU visit with me   ----- Message -----  From: Anastasia Horne RN  Sent: 6/21/2024   1:05 PM EDT  To: Rachael Wright MD  Subject: Lung cancer screening program                    This is to notify you that your patient is qualified to be enrolled in the Lung Cancer Screening Program. If your patient is interested, please let them know and place an order for a CT low dose lung screening ([id number]).     Thank You,  Anastasia  Lung Cancer Screening Navigation Team   [phone]

## 2024-06-21 NOTE — TELEPHONE ENCOUNTER
After looking into this pt chart it was discovered he has not seen Dr Portillo, he was not seeing a PCP in  rather than outside private practice, Somebody placed Dr Portillo as his PCP on 5/21/2024 and Dr Portillo was not accepting new patients. Pt said he will be finding a new PCP in  in this regards as he said he has not seen Dr Portillo.

## 2024-06-24 RX ORDER — SACUBITRIL AND VALSARTAN 24; 26 MG/1; MG/1
TABLET, FILM COATED ORAL
Qty: 60 TABLET | Refills: 0 | Status: CANCELLED | OUTPATIENT
Start: 2024-06-24

## 2024-08-30 ENCOUNTER — TELEMEDICINE (OUTPATIENT)
Dept: PRIMARY CARE | Facility: CLINIC | Age: 54
End: 2024-08-30
Payer: COMMERCIAL

## 2024-08-30 VITALS — WEIGHT: 165 LBS | HEIGHT: 71 IN | BODY MASS INDEX: 23.1 KG/M2

## 2024-08-30 DIAGNOSIS — Z76.89 ESTABLISHING CARE WITH NEW DOCTOR, ENCOUNTER FOR: ICD-10-CM

## 2024-08-30 ASSESSMENT — PAIN SCALES - GENERAL: PAINLEVEL: 0-NO PAIN

## 2024-09-10 ENCOUNTER — APPOINTMENT (OUTPATIENT)
Dept: URGENT CARE | Age: 54
End: 2024-09-10
Payer: COMMERCIAL

## 2024-09-10 ENCOUNTER — HOSPITAL ENCOUNTER (EMERGENCY)
Facility: HOSPITAL | Age: 54
Discharge: OTHER INSTITUTION | End: 2024-09-10
Payer: COMMERCIAL

## 2024-09-10 ENCOUNTER — HOSPITAL ENCOUNTER (OUTPATIENT)
Dept: RADIOLOGY | Facility: HOSPITAL | Age: 54
Discharge: HOME | End: 2024-09-10
Payer: COMMERCIAL

## 2024-09-10 ENCOUNTER — OFFICE VISIT (OUTPATIENT)
Dept: URGENT CARE | Age: 54
End: 2024-09-10
Payer: COMMERCIAL

## 2024-09-10 VITALS
HEIGHT: 71 IN | RESPIRATION RATE: 18 BRPM | BODY MASS INDEX: 23.1 KG/M2 | HEART RATE: 82 BPM | TEMPERATURE: 98.3 F | OXYGEN SATURATION: 98 % | WEIGHT: 165 LBS | DIASTOLIC BLOOD PRESSURE: 103 MMHG | SYSTOLIC BLOOD PRESSURE: 144 MMHG

## 2024-09-10 DIAGNOSIS — S61.215A LACERATION OF LEFT RING FINGER WITHOUT DAMAGE TO NAIL, FOREIGN BODY PRESENCE UNSPECIFIED, INITIAL ENCOUNTER: Primary | ICD-10-CM

## 2024-09-10 DIAGNOSIS — S61.215A LACERATION OF LEFT RING FINGER WITHOUT DAMAGE TO NAIL, FOREIGN BODY PRESENCE UNSPECIFIED, INITIAL ENCOUNTER: ICD-10-CM

## 2024-09-10 PROCEDURE — 4500999001 HC ED NO CHARGE

## 2024-09-10 PROCEDURE — 73140 X-RAY EXAM OF FINGER(S): CPT | Mod: LT

## 2024-09-10 PROCEDURE — 73140 X-RAY EXAM OF FINGER(S): CPT | Mod: LEFT SIDE | Performed by: RADIOLOGY

## 2024-09-10 NOTE — PROGRESS NOTES
"Subjective   Patient ID: Los Rubio is a 54 y.o. male. They present today with a chief complaint of Finger Laceration (Ring finger ).    History of Present Illness  54-year-old male presents to urgent with his wife for evaluation of a laceration/wound to his left ring finger.  States that he cut the finger with a drill bit about 8 hours ago.  States he was working on a boat.  Denies any other injuries.  Reports his tetanus is up-to-date.  Cleaning it with Hibiclens at home and dressing it with bandages.          Past Medical History  Allergies as of 09/10/2024 - Reviewed 08/30/2024   Allergen Reaction Noted    Shellfish derived Anaphylaxis 05/21/2024       (Not in a hospital admission)       Past Medical History:   Diagnosis Date    Hypertension        No past surgical history on file.     reports that he has been smoking cigarettes. He started smoking about 34 years ago. He has a 69.4 pack-year smoking history. He has quit using smokeless tobacco. He reports current drug use. Drug: Marijuana.    Review of Systems  Review of Systems   All other systems reviewed and are negative.                                 Objective    Vitals:    09/10/24 1130   BP: (!) 144/103   BP Location: Right arm   Patient Position: Sitting   BP Cuff Size: Adult   Pulse: 82   Resp: 18   Temp: 36.8 °C (98.3 °F)   TempSrc: Oral   SpO2: 98%   Weight: 74.8 kg (165 lb)   Height: 1.803 m (5' 11\")     No LMP for male patient.    Physical Exam  Vitals reviewed.   Constitutional:       Appearance: Normal appearance.   Pulmonary:      Effort: Pulmonary effort is normal.   Musculoskeletal:      Cervical back: Normal range of motion.      Comments: Laceration/puncture wound to the volar aspect of the distal left ring finger.  Granulated tissue noted within the wound.  No fluctuance or induration.  Distal sensation is intact.  Good strength and resistance.  Cap refill less than 2 seconds.   Skin:     General: Skin is warm and dry.   Neurological: "      Mental Status: He is alert and oriented to person, place, and time.   Psychiatric:         Mood and Affect: Mood normal.         Procedures    Point of Care Test & Imaging Results from this visit  Results for orders placed or performed during the hospital encounter of 05/21/24   CBC and Auto Differential   Result Value Ref Range    WBC 15.5 (H) 4.4 - 11.3 x10*3/uL    nRBC 0.0 0.0 - 0.0 /100 WBCs    RBC 5.64 4.50 - 5.90 x10*6/uL    Hemoglobin 15.0 13.5 - 17.5 g/dL    Hematocrit 45.4 41.0 - 52.0 %    MCV 81 80 - 100 fL    MCH 26.6 26.0 - 34.0 pg    MCHC 33.0 32.0 - 36.0 g/dL    RDW 18.5 (H) 11.5 - 14.5 %    Platelets 408 150 - 450 x10*3/uL    Neutrophils % 64.1 40.0 - 80.0 %    Immature Granulocytes %, Automated 0.5 0.0 - 0.9 %    Lymphocytes % 27.3 13.0 - 44.0 %    Monocytes % 5.7 2.0 - 10.0 %    Eosinophils % 1.6 0.0 - 6.0 %    Basophils % 0.8 0.0 - 2.0 %    Neutrophils Absolute 9.90 (H) 1.20 - 7.70 x10*3/uL    Immature Granulocytes Absolute, Automated 0.08 0.00 - 0.70 x10*3/uL    Lymphocytes Absolute 4.22 1.20 - 4.80 x10*3/uL    Monocytes Absolute 0.88 0.10 - 1.00 x10*3/uL    Eosinophils Absolute 0.25 0.00 - 0.70 x10*3/uL    Basophils Absolute 0.12 (H) 0.00 - 0.10 x10*3/uL   Comprehensive metabolic panel   Result Value Ref Range    Glucose 180 (H) 65 - 99 mg/dL    Sodium 138 133 - 145 mmol/L    Potassium 4.0 3.4 - 5.1 mmol/L    Chloride 102 97 - 107 mmol/L    Bicarbonate 23 (L) 24 - 31 mmol/L    Urea Nitrogen 20 8 - 25 mg/dL    Creatinine 1.10 0.40 - 1.60 mg/dL    eGFR 80 >60 mL/min/1.73m*2    Calcium 9.1 8.5 - 10.4 mg/dL    Albumin 4.0 3.5 - 5.0 g/dL    Alkaline Phosphatase 90 35 - 125 U/L    Total Protein 6.8 5.9 - 7.9 g/dL    AST 86 (H) 5 - 40 U/L    Bilirubin, Total 0.3 0.1 - 1.2 mg/dL    ALT 50 (H) 5 - 40 U/L    Anion Gap 13 <=19 mmol/L   TSH with reflex to Free T4 if abnormal   Result Value Ref Range    Thyroid Stimulating Hormone 2.62 0.27 - 4.20 mIU/L   NT Pro-BNP   Result Value Ref Range    PROBNP 582  (H) 0 - 138 pg/mL   Serial Troponin, Initial ([facility])   Result Value Ref Range    Troponin T, High Sensitivity 22 (HH) <=14 ng/L   Serial Troponin, 2 Hour ([facility])   Result Value Ref Range    Troponin T, High Sensitivity 35 (HH) <=14 ng/L   Serial Troponin, 6 Hour ([facility])   Result Value Ref Range    Troponin T, High Sensitivity 36 (HH) <=14 ng/L   Drug Screen, Urine   Result Value Ref Range    Amphetamine Screen, Urine Negative      Barbiturate Screen, Urine Negative      Benzodiazepines Screen, Urine Positive (A)      Cannabinoid Screen, Urine Positive (A)      Cocaine Metabolite Screen, Urine Negative      Fentanyl Screen, Urine Positive (A)       Methadone Screen, Urine Negative      Opiate Screen, Urine Negative      Oxycodone Screen, Urine Negative      PCP Screen, Urine Negative     Urinalysis with Reflex Culture and Microscopic   Result Value Ref Range    Color, Urine Yellow Light-Yellow, Yellow, Dark-Yellow    Appearance, Urine Clear Clear    Specific Gravity, Urine 1.029 1.005 - 1.035    pH, Urine 6.0 5.0, 5.5, 6.0, 6.5, 7.0, 7.5, 8.0    Protein, Urine 20 (TRACE) NEGATIVE, 10 (TRACE), 20 (TRACE) mg/dL    Glucose, Urine Normal Normal mg/dL    Blood, Urine NEGATIVE NEGATIVE    Ketones, Urine NEGATIVE NEGATIVE mg/dL    Bilirubin, Urine NEGATIVE NEGATIVE    Urobilinogen, Urine Normal Normal mg/dL    Nitrite, Urine NEGATIVE NEGATIVE    Leukocyte Esterase, Urine NEGATIVE NEGATIVE   Extra Urine Gray Tube   Result Value Ref Range    Extra Tube Hold for add-ons.    Hemoglobin A1c   Result Value Ref Range    Hemoglobin A1C 5.7 (H) See below %    Estimated Average Glucose 117 Not Established mg/dL   Magnesium   Result Value Ref Range    Magnesium 2.20 1.60 - 3.10 mg/dL   Phosphorus   Result Value Ref Range    Phosphorus 3.7 2.5 - 4.5 mg/dL   Magnesium   Result Value Ref Range    Magnesium 2.10 1.60 - 3.10 mg/dL   Lavender Top   Result Value Ref Range    Extra Tube Hold for add-ons.    Comprehensive metabolic panel   Result  Value Ref Range    Glucose 102 (H) 65 - 99 mg/dL    Sodium 136 133 - 145 mmol/L    Potassium 3.9 3.4 - 5.1 mmol/L    Chloride 102 97 - 107 mmol/L    Bicarbonate 24 24 - 31 mmol/L    Urea Nitrogen 13 8 - 25 mg/dL    Creatinine 0.90 0.40 - 1.60 mg/dL    eGFR >90 >60 mL/min/1.73m*2    Calcium 8.8 8.5 - 10.4 mg/dL    Albumin 3.9 3.5 - 5.0 g/dL    Alkaline Phosphatase 79 35 - 125 U/L    Total Protein 6.2 5.9 - 7.9 g/dL    AST 32 5 - 40 U/L    Bilirubin, Total 0.4 0.1 - 1.2 mg/dL    ALT 47 (H) 5 - 40 U/L    Anion Gap 10 <=19 mmol/L   CBC and Auto Differential   Result Value Ref Range    WBC 14.4 (H) 4.4 - 11.3 x10*3/uL    nRBC 0.0 0.0 - 0.0 /100 WBCs    RBC 5.02 4.50 - 5.90 x10*6/uL    Hemoglobin 13.4 (L) 13.5 - 17.5 g/dL    Hematocrit 39.8 (L) 41.0 - 52.0 %    MCV 79 (L) 80 - 100 fL    MCH 26.7 26.0 - 34.0 pg    MCHC 33.7 32.0 - 36.0 g/dL    RDW 17.6 (H) 11.5 - 14.5 %    Platelets 393 150 - 450 x10*3/uL    Neutrophils % 68.0 40.0 - 80.0 %    Immature Granulocytes %, Automated 0.3 0.0 - 0.9 %    Lymphocytes % 23.0 13.0 - 44.0 %    Monocytes % 6.7 2.0 - 10.0 %    Eosinophils % 1.3 0.0 - 6.0 %    Basophils % 0.7 0.0 - 2.0 %    Neutrophils Absolute 9.78 (H) 1.20 - 7.70 x10*3/uL    Immature Granulocytes Absolute, Automated 0.05 0.00 - 0.70 x10*3/uL    Lymphocytes Absolute 3.30 1.20 - 4.80 x10*3/uL    Monocytes Absolute 0.96 0.10 - 1.00 x10*3/uL    Eosinophils Absolute 0.18 0.00 - 0.70 x10*3/uL    Basophils Absolute 0.10 0.00 - 0.10 x10*3/uL   Magnesium   Result Value Ref Range    Magnesium 1.90 1.60 - 3.10 mg/dL   Phosphorus   Result Value Ref Range    Phosphorus 2.8 2.5 - 4.5 mg/dL   Coagulation Screen   Result Value Ref Range    Protime 10.8 9.3 - 12.7 seconds    INR 1.0 0.9 - 1.2    aPTT 26.5 22.0 - 32.5 seconds   Comprehensive metabolic panel   Result Value Ref Range    Glucose 92 65 - 99 mg/dL    Sodium 136 133 - 145 mmol/L    Potassium 3.9 3.4 - 5.1 mmol/L    Chloride 98 97 - 107 mmol/L    Bicarbonate 24 24 - 31 mmol/L     Urea Nitrogen 12 8 - 25 mg/dL    Creatinine 1.00 0.40 - 1.60 mg/dL    eGFR 90 >60 mL/min/1.73m*2    Calcium 9.7 8.5 - 10.4 mg/dL    Albumin 4.0 3.5 - 5.0 g/dL    Alkaline Phosphatase 86 35 - 125 U/L    Total Protein 7.0 5.9 - 7.9 g/dL    AST 23 5 - 40 U/L    Bilirubin, Total 0.6 0.1 - 1.2 mg/dL    ALT 41 (H) 5 - 40 U/L    Anion Gap 14 <=19 mmol/L   CBC and Auto Differential   Result Value Ref Range    WBC 13.3 (H) 4.4 - 11.3 x10*3/uL    nRBC 0.0 0.0 - 0.0 /100 WBCs    RBC 6.11 (H) 4.50 - 5.90 x10*6/uL    Hemoglobin 16.0 13.5 - 17.5 g/dL    Hematocrit 47.4 41.0 - 52.0 %    MCV 78 (L) 80 - 100 fL    MCH 26.2 26.0 - 34.0 pg    MCHC 33.8 32.0 - 36.0 g/dL    RDW 18.3 (H) 11.5 - 14.5 %    Platelets 433 150 - 450 x10*3/uL    Neutrophils % 66.4 40.0 - 80.0 %    Immature Granulocytes %, Automated 0.3 0.0 - 0.9 %    Lymphocytes % 22.0 13.0 - 44.0 %    Monocytes % 8.6 2.0 - 10.0 %    Eosinophils % 1.9 0.0 - 6.0 %    Basophils % 0.8 0.0 - 2.0 %    Neutrophils Absolute 8.86 (H) 1.20 - 7.70 x10*3/uL    Immature Granulocytes Absolute, Automated 0.04 0.00 - 0.70 x10*3/uL    Lymphocytes Absolute 2.93 1.20 - 4.80 x10*3/uL    Monocytes Absolute 1.15 (H) 0.10 - 1.00 x10*3/uL    Eosinophils Absolute 0.25 0.00 - 0.70 x10*3/uL    Basophils Absolute 0.10 0.00 - 0.10 x10*3/uL   Magnesium   Result Value Ref Range    Magnesium 1.80 1.60 - 3.10 mg/dL   Phosphorus   Result Value Ref Range    Phosphorus 4.1 2.5 - 4.5 mg/dL   CBC and Auto Differential   Result Value Ref Range    WBC 14.1 (H) 4.4 - 11.3 x10*3/uL    nRBC 0.0 0.0 - 0.0 /100 WBCs    RBC 6.35 (H) 4.50 - 5.90 x10*6/uL    Hemoglobin 16.9 13.5 - 17.5 g/dL    Hematocrit 49.7 41.0 - 52.0 %    MCV 78 (L) 80 - 100 fL    MCH 26.6 26.0 - 34.0 pg    MCHC 34.0 32.0 - 36.0 g/dL    RDW 18.4 (H) 11.5 - 14.5 %    Platelets 450 150 - 450 x10*3/uL    Neutrophils % 65.3 40.0 - 80.0 %    Immature Granulocytes %, Automated 0.6 0.0 - 0.9 %    Lymphocytes % 21.7 13.0 - 44.0 %    Monocytes % 9.8 2.0 -  10.0 %    Eosinophils % 1.8 0.0 - 6.0 %    Basophils % 0.8 0.0 - 2.0 %    Neutrophils Absolute 9.18 (H) 1.20 - 7.70 x10*3/uL    Immature Granulocytes Absolute, Automated 0.09 0.00 - 0.70 x10*3/uL    Lymphocytes Absolute 3.05 1.20 - 4.80 x10*3/uL    Monocytes Absolute 1.37 (H) 0.10 - 1.00 x10*3/uL    Eosinophils Absolute 0.25 0.00 - 0.70 x10*3/uL    Basophils Absolute 0.11 (H) 0.00 - 0.10 x10*3/uL   Magnesium   Result Value Ref Range    Magnesium 1.80 1.60 - 3.10 mg/dL   Phosphorus   Result Value Ref Range    Phosphorus 4.7 (H) 2.5 - 4.5 mg/dL   Basic Metabolic Panel   Result Value Ref Range    Glucose 101 (H) 65 - 99 mg/dL    Sodium 134 133 - 145 mmol/L    Potassium 4.3 3.4 - 5.1 mmol/L    Chloride 96 (L) 97 - 107 mmol/L    Bicarbonate 23 (L) 24 - 31 mmol/L    Urea Nitrogen 19 8 - 25 mg/dL    Creatinine 1.10 0.40 - 1.60 mg/dL    eGFR 80 >60 mL/min/1.73m*2    Calcium 9.7 8.5 - 10.4 mg/dL    Anion Gap 15 <=19 mmol/L   POCT GLUCOSE   Result Value Ref Range    POCT Glucose 111 (H) 74 - 99 mg/dL   POCT GLUCOSE   Result Value Ref Range    POCT Glucose 106 (H) 74 - 99 mg/dL   POCT GLUCOSE   Result Value Ref Range    POCT Glucose 95 74 - 99 mg/dL   POCT GLUCOSE   Result Value Ref Range    POCT Glucose 102 (H) 74 - 99 mg/dL   POCT GLUCOSE   Result Value Ref Range    POCT Glucose 114 (H) 74 - 99 mg/dL   POCT GLUCOSE   Result Value Ref Range    POCT Glucose 121 (H) 74 - 99 mg/dL   POCT GLUCOSE   Result Value Ref Range    POCT Glucose 179 (H) 74 - 99 mg/dL   Electrocardiogram, 12-lead PRN ACS symptoms   Result Value Ref Range    Ventricular Rate 204 BPM    QRS Duration 70 ms    QT Interval 210 ms    QTC Calculation(Bazett) 387 ms    R Axis -4 degrees    T Axis 34 degrees    QRS Count 34 beats    Q Onset 229 ms    T Offset 334 ms    QTC Fredericia 315 ms   Electrocardiogram, 12-lead PRN ACS symptoms   Result Value Ref Range    Ventricular Rate 193 BPM    Atrial Rate 197 BPM    QRS Duration 80 ms    QT Interval 236 ms    QTC  Calculation(Bazett) 423 ms    R Axis 22 degrees    T Axis 31 degrees    QRS Count 32 beats    Q Onset 225 ms    T Offset 343 ms    QTC Fredericia 348 ms   Transthoracic Echo (TTE) Complete   Result Value Ref Range    AV pk ranjana 0.88 m/s    LVOT diam 2.14 cm    AV mn grad 1.7 mmHg    MV E/A ratio 1.09     LV Biplane EF 33 %    LA vol index A/L 37.4 ml/m2    MV avg E/e' ratio 7.15     RV free wall pk S' 8.10 cm/s    RVSP 38.9 mmHg    LVIDd 4.93 cm    AV pk grad 3.1 mmHg    Aortic Valve Area by Continuity of VTI 3.51 cm2    Aortic Valve Area by Continuity of Peak Velocity 3.47 cm2    LV A4C EF 28.7    Urinalysis Microscopic   Result Value Ref Range    WBC, Urine 1-5 1-5, NONE /HPF    RBC, Urine NONE NONE, 1-2, 3-5 /HPF    Mucus, Urine FEW Reference range not established. /LPF     No results found.    Diagnostic study results (if any) were reviewed by Cosme Reyes PA-C.    Assessment/Plan   Allergies, medications, history, and pertinent labs/EKGs/Imaging reviewed by Cosme Reyes PA-C.     Medical Decision Making  On my review of x-rays of the left ring finger are noted no foreign bodies or fracture.    Orders and Diagnoses  Diagnoses and all orders for this visit:  Laceration of left ring finger without damage to nail, foreign body presence unspecified, initial encounter  -     XR fingers left 2+ views; Future      Medical Admin Record      Follow Up Instructions  No follow-ups on file.    Patient disposition: Home    Electronically signed by Cosme Reyes PA-C  11:49 AM

## 2024-09-19 ENCOUNTER — APPOINTMENT (OUTPATIENT)
Dept: PRIMARY CARE | Facility: CLINIC | Age: 54
End: 2024-09-19
Payer: COMMERCIAL

## 2024-09-23 ENCOUNTER — APPOINTMENT (OUTPATIENT)
Dept: PRIMARY CARE | Facility: CLINIC | Age: 54
End: 2024-09-23
Payer: COMMERCIAL

## 2025-01-16 ENCOUNTER — HOSPITAL ENCOUNTER (INPATIENT)
Facility: HOSPITAL | Age: 55
LOS: 1 days | Discharge: HOME | DRG: 309 | End: 2025-01-17
Attending: STUDENT IN AN ORGANIZED HEALTH CARE EDUCATION/TRAINING PROGRAM | Admitting: INTERNAL MEDICINE
Payer: COMMERCIAL

## 2025-01-16 ENCOUNTER — OFFICE VISIT (OUTPATIENT)
Dept: URGENT CARE | Age: 55
End: 2025-01-16
Payer: COMMERCIAL

## 2025-01-16 ENCOUNTER — APPOINTMENT (OUTPATIENT)
Dept: RADIOLOGY | Facility: HOSPITAL | Age: 55
DRG: 309 | End: 2025-01-16
Payer: COMMERCIAL

## 2025-01-16 ENCOUNTER — HOSPITAL ENCOUNTER (OUTPATIENT)
Dept: CARDIOLOGY | Facility: HOSPITAL | Age: 55
Discharge: HOME | DRG: 309 | End: 2025-01-16
Payer: COMMERCIAL

## 2025-01-16 VITALS — HEART RATE: 210 BPM | DIASTOLIC BLOOD PRESSURE: 102 MMHG | OXYGEN SATURATION: 97 % | SYSTOLIC BLOOD PRESSURE: 154 MMHG

## 2025-01-16 DIAGNOSIS — R00.0 TACHYCARDIA: Primary | ICD-10-CM

## 2025-01-16 DIAGNOSIS — I42.9 CARDIOMYOPATHY, UNSPECIFIED TYPE (MULTI): ICD-10-CM

## 2025-01-16 DIAGNOSIS — I47.10 SVT (SUPRAVENTRICULAR TACHYCARDIA) (CMS-HCC): Primary | ICD-10-CM

## 2025-01-16 PROBLEM — I50.22 CHRONIC SYSTOLIC CONGESTIVE HEART FAILURE: Status: ACTIVE | Noted: 2025-01-16

## 2025-01-16 PROBLEM — F10.10 ALCOHOL ABUSE: Status: ACTIVE | Noted: 2025-01-16

## 2025-01-16 LAB
ALBUMIN SERPL BCP-MCNC: 4.1 G/DL (ref 3.4–5)
ALP SERPL-CCNC: 66 U/L (ref 33–120)
ALT SERPL W P-5'-P-CCNC: 33 U/L (ref 10–52)
ANION GAP SERPL CALCULATED.3IONS-SCNC: 12 MMOL/L (ref 10–20)
AST SERPL W P-5'-P-CCNC: 33 U/L (ref 9–39)
BASOPHILS # BLD AUTO: 0.12 X10*3/UL (ref 0–0.1)
BASOPHILS NFR BLD AUTO: 1.1 %
BILIRUB SERPL-MCNC: 0.4 MG/DL (ref 0–1.2)
BUN SERPL-MCNC: 17 MG/DL (ref 6–23)
CALCIUM SERPL-MCNC: 8.8 MG/DL (ref 8.6–10.3)
CARDIAC TROPONIN I PNL SERPL HS: 6 NG/L (ref 0–20)
CHLORIDE SERPL-SCNC: 104 MMOL/L (ref 98–107)
CO2 SERPL-SCNC: 26 MMOL/L (ref 21–32)
CREAT SERPL-MCNC: 1.09 MG/DL (ref 0.5–1.3)
EGFRCR SERPLBLD CKD-EPI 2021: 81 ML/MIN/1.73M*2
EOSINOPHIL # BLD AUTO: 0.33 X10*3/UL (ref 0–0.7)
EOSINOPHIL NFR BLD AUTO: 2.9 %
ERYTHROCYTE [DISTWIDTH] IN BLOOD BY AUTOMATED COUNT: 18.4 % (ref 11.5–14.5)
GLUCOSE SERPL-MCNC: 154 MG/DL (ref 74–99)
HCT VFR BLD AUTO: 42.6 % (ref 41–52)
HGB BLD-MCNC: 14.2 G/DL (ref 13.5–17.5)
IMM GRANULOCYTES # BLD AUTO: 0.03 X10*3/UL (ref 0–0.7)
IMM GRANULOCYTES NFR BLD AUTO: 0.3 % (ref 0–0.9)
LYMPHOCYTES # BLD AUTO: 3.4 X10*3/UL (ref 1.2–4.8)
LYMPHOCYTES NFR BLD AUTO: 30.2 %
MCH RBC QN AUTO: 26.4 PG (ref 26–34)
MCHC RBC AUTO-ENTMCNC: 33.3 G/DL (ref 32–36)
MCV RBC AUTO: 79 FL (ref 80–100)
MONOCYTES # BLD AUTO: 0.89 X10*3/UL (ref 0.1–1)
MONOCYTES NFR BLD AUTO: 7.9 %
NEUTROPHILS # BLD AUTO: 6.47 X10*3/UL (ref 1.2–7.7)
NEUTROPHILS NFR BLD AUTO: 57.6 %
NRBC BLD-RTO: 0 /100 WBCS (ref 0–0)
PLATELET # BLD AUTO: 502 X10*3/UL (ref 150–450)
POTASSIUM SERPL-SCNC: 4 MMOL/L (ref 3.5–5.3)
PROT SERPL-MCNC: 6.5 G/DL (ref 6.4–8.2)
RBC # BLD AUTO: 5.37 X10*6/UL (ref 4.5–5.9)
SODIUM SERPL-SCNC: 138 MMOL/L (ref 136–145)
TSH SERPL-ACNC: 1.26 MIU/L (ref 0.44–3.98)
WBC # BLD AUTO: 11.2 X10*3/UL (ref 4.4–11.3)

## 2025-01-16 PROCEDURE — 96375 TX/PRO/DX INJ NEW DRUG ADDON: CPT

## 2025-01-16 PROCEDURE — 85025 COMPLETE CBC W/AUTO DIFF WBC: CPT | Performed by: PHYSICIAN ASSISTANT

## 2025-01-16 PROCEDURE — 2500000004 HC RX 250 GENERAL PHARMACY W/ HCPCS (ALT 636 FOR OP/ED): Performed by: STUDENT IN AN ORGANIZED HEALTH CARE EDUCATION/TRAINING PROGRAM

## 2025-01-16 PROCEDURE — 93005 ELECTROCARDIOGRAM TRACING: CPT

## 2025-01-16 PROCEDURE — 84443 ASSAY THYROID STIM HORMONE: CPT | Performed by: PHYSICIAN ASSISTANT

## 2025-01-16 PROCEDURE — 71045 X-RAY EXAM CHEST 1 VIEW: CPT | Mod: FOREIGN READ | Performed by: RADIOLOGY

## 2025-01-16 PROCEDURE — 84484 ASSAY OF TROPONIN QUANT: CPT | Performed by: PHYSICIAN ASSISTANT

## 2025-01-16 PROCEDURE — 2500000004 HC RX 250 GENERAL PHARMACY W/ HCPCS (ALT 636 FOR OP/ED): Performed by: PHYSICIAN ASSISTANT

## 2025-01-16 PROCEDURE — 36415 COLL VENOUS BLD VENIPUNCTURE: CPT | Performed by: PHYSICIAN ASSISTANT

## 2025-01-16 PROCEDURE — 2500000001 HC RX 250 WO HCPCS SELF ADMINISTERED DRUGS (ALT 637 FOR MEDICARE OP): Performed by: INTERNAL MEDICINE

## 2025-01-16 PROCEDURE — 99291 CRITICAL CARE FIRST HOUR: CPT | Performed by: PHYSICIAN ASSISTANT

## 2025-01-16 PROCEDURE — 99291 CRITICAL CARE FIRST HOUR: CPT | Mod: 25 | Performed by: STUDENT IN AN ORGANIZED HEALTH CARE EDUCATION/TRAINING PROGRAM

## 2025-01-16 PROCEDURE — 2060000001 HC INTERMEDIATE ICU ROOM DAILY

## 2025-01-16 PROCEDURE — 96374 THER/PROPH/DIAG INJ IV PUSH: CPT

## 2025-01-16 PROCEDURE — 80053 COMPREHEN METABOLIC PANEL: CPT | Performed by: PHYSICIAN ASSISTANT

## 2025-01-16 PROCEDURE — 71045 X-RAY EXAM CHEST 1 VIEW: CPT

## 2025-01-16 RX ORDER — ADENOSINE 3 MG/ML
12 INJECTION, SOLUTION INTRAVENOUS ONCE
Status: COMPLETED | OUTPATIENT
Start: 2025-01-16 | End: 2025-01-16

## 2025-01-16 RX ORDER — ACETAMINOPHEN 325 MG/1
650 TABLET ORAL EVERY 4 HOURS PRN
Status: DISCONTINUED | OUTPATIENT
Start: 2025-01-16 | End: 2025-01-17 | Stop reason: HOSPADM

## 2025-01-16 RX ORDER — ACETAMINOPHEN 650 MG/1
650 SUPPOSITORY RECTAL EVERY 4 HOURS PRN
Status: DISCONTINUED | OUTPATIENT
Start: 2025-01-16 | End: 2025-01-17 | Stop reason: HOSPADM

## 2025-01-16 RX ORDER — LABETALOL 200 MG/1
200 TABLET, FILM COATED ORAL 2 TIMES DAILY
COMMUNITY
End: 2025-01-17 | Stop reason: HOSPADM

## 2025-01-16 RX ORDER — FOLIC ACID 1 MG/1
1 TABLET ORAL DAILY
Status: DISCONTINUED | OUTPATIENT
Start: 2025-01-16 | End: 2025-01-17 | Stop reason: HOSPADM

## 2025-01-16 RX ORDER — ACETAMINOPHEN 160 MG/5ML
650 SOLUTION ORAL EVERY 4 HOURS PRN
Status: DISCONTINUED | OUTPATIENT
Start: 2025-01-16 | End: 2025-01-17 | Stop reason: HOSPADM

## 2025-01-16 RX ORDER — MULTIVIT-MIN/IRON FUM/FOLIC AC 7.5 MG-4
1 TABLET ORAL DAILY
Status: DISCONTINUED | OUTPATIENT
Start: 2025-01-16 | End: 2025-01-17 | Stop reason: HOSPADM

## 2025-01-16 RX ORDER — ACETAMINOPHEN 500 MG
5 TABLET ORAL NIGHTLY PRN
Status: DISCONTINUED | OUTPATIENT
Start: 2025-01-16 | End: 2025-01-17 | Stop reason: HOSPADM

## 2025-01-16 RX ORDER — IBUPROFEN 200 MG
1-3 TABLET ORAL EVERY 6 HOURS PRN
COMMUNITY
End: 2025-01-17 | Stop reason: HOSPADM

## 2025-01-16 RX ORDER — ALBUTEROL SULFATE 0.83 MG/ML
2.5 SOLUTION RESPIRATORY (INHALATION) 4 TIMES DAILY
COMMUNITY
End: 2025-01-17 | Stop reason: HOSPADM

## 2025-01-16 RX ORDER — SACUBITRIL AND VALSARTAN 97; 103 MG/1; MG/1
1 TABLET, FILM COATED ORAL 2 TIMES DAILY
COMMUNITY
End: 2025-01-17 | Stop reason: HOSPADM

## 2025-01-16 RX ADMIN — SODIUM CHLORIDE 1000 ML: 900 INJECTION, SOLUTION INTRAVENOUS at 16:18

## 2025-01-16 RX ADMIN — Medication 1 TABLET: at 21:31

## 2025-01-16 RX ADMIN — AMIODARONE HYDROCHLORIDE 1 MG/MIN: 1.8 INJECTION, SOLUTION INTRAVENOUS at 22:54

## 2025-01-16 RX ADMIN — AMIODARONE HYDROCHLORIDE 150 MG: 1.5 INJECTION, SOLUTION INTRAVENOUS at 16:45

## 2025-01-16 RX ADMIN — AMIODARONE HYDROCHLORIDE 1 MG/MIN: 1.8 INJECTION, SOLUTION INTRAVENOUS at 17:03

## 2025-01-16 RX ADMIN — ADENOSINE 12 MG: 3 INJECTION INTRAVENOUS at 16:16

## 2025-01-16 RX ADMIN — FOLIC ACID 1 MG: 1 TABLET ORAL at 21:31

## 2025-01-16 RX ADMIN — ADENOSINE 12 MG: 3 INJECTION INTRAVENOUS at 16:22

## 2025-01-16 ASSESSMENT — LIFESTYLE VARIABLES
HAVE PEOPLE ANNOYED YOU BY CRITICIZING YOUR DRINKING: NO
TOTAL SCORE: 0
EVER HAD A DRINK FIRST THING IN THE MORNING TO STEADY YOUR NERVES TO GET RID OF A HANGOVER: NO
HAVE YOU EVER FELT YOU SHOULD CUT DOWN ON YOUR DRINKING: NO
EVER FELT BAD OR GUILTY ABOUT YOUR DRINKING: NO

## 2025-01-16 ASSESSMENT — PAIN SCALES - GENERAL
PAINLEVEL_OUTOF10: 0 - NO PAIN
PAINLEVEL_OUTOF10: 4

## 2025-01-16 ASSESSMENT — COLUMBIA-SUICIDE SEVERITY RATING SCALE - C-SSRS
1. IN THE PAST MONTH, HAVE YOU WISHED YOU WERE DEAD OR WISHED YOU COULD GO TO SLEEP AND NOT WAKE UP?: NO
6. HAVE YOU EVER DONE ANYTHING, STARTED TO DO ANYTHING, OR PREPARED TO DO ANYTHING TO END YOUR LIFE?: NO
2. HAVE YOU ACTUALLY HAD ANY THOUGHTS OF KILLING YOURSELF?: NO

## 2025-01-16 NOTE — H&P
"History Of Present Illness  Los Rubio is a 54 y.o. male presenting with palpitations.  Patient has a history of supraventricular tachycardia and systolic heart failure, EF earlier this year in May being 35%.  Patient was lying down and doing anything in particular when he developed palpitations.  No chest pain.  Denies any recent illness.  He became more lightheaded, and eventually went to the urgent care, he was found to have a heart rate in the 200s, and EMS was called.  He was given adenosine 6 mg by EMS, and 12 mg x 2 by ER without improvement.  After discussion with cardiology, amiodarone drip was started, the patient actually had a episode of emesis, and after retching converted back to sinus rhythm.  Patient feels well overall.  Currently amiodarone drip.  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     Past Medical History  He has a past medical history of Hypertension.    Surgical History  He has no past surgical history on file.     Social History  Current tobacco use  Daily alcohol use, approximately 4-6 standard drinks    Family History  No family history on file.     Allergies  Shellfish derived    Review of Systems   All other systems reviewed and are negative.       Physical Exam  General Middle-age male, nontoxic  Eyes: Clear sclera  Neck: No JVD  Cardio: Currently regular rate rhythm.  Supraventricular tachycardia on telemetry.  Respiratory: Bilateral somewhat coarse.  No distress.  Extremities: No lower extreme edema  Psychiatrical appropriate mood and affect  Skin: Warm and dry  Musculoskeletal: No bony deformity  Last Recorded Vitals  /88 (BP Location: Right arm, Patient Position: Lying)   Pulse (!) 103   Resp 18   Wt 72.6 kg (160 lb)   SpO2 97%     Relevant Results        Results for orders placed or performed during the hospital encounter of 01/16/25 (from the past 24 hours)   CBC and Auto Differential   Result Value Ref Range    " WBC 11.2 4.4 - 11.3 x10*3/uL    nRBC 0.0 0.0 - 0.0 /100 WBCs    RBC 5.37 4.50 - 5.90 x10*6/uL    Hemoglobin 14.2 13.5 - 17.5 g/dL    Hematocrit 42.6 41.0 - 52.0 %    MCV 79 (L) 80 - 100 fL    MCH 26.4 26.0 - 34.0 pg    MCHC 33.3 32.0 - 36.0 g/dL    RDW 18.4 (H) 11.5 - 14.5 %    Platelets 502 (H) 150 - 450 x10*3/uL    Neutrophils % 57.6 40.0 - 80.0 %    Immature Granulocytes %, Automated 0.3 0.0 - 0.9 %    Lymphocytes % 30.2 13.0 - 44.0 %    Monocytes % 7.9 2.0 - 10.0 %    Eosinophils % 2.9 0.0 - 6.0 %    Basophils % 1.1 0.0 - 2.0 %    Neutrophils Absolute 6.47 1.20 - 7.70 x10*3/uL    Immature Granulocytes Absolute, Automated 0.03 0.00 - 0.70 x10*3/uL    Lymphocytes Absolute 3.40 1.20 - 4.80 x10*3/uL    Monocytes Absolute 0.89 0.10 - 1.00 x10*3/uL    Eosinophils Absolute 0.33 0.00 - 0.70 x10*3/uL    Basophils Absolute 0.12 (H) 0.00 - 0.10 x10*3/uL   Comprehensive metabolic panel   Result Value Ref Range    Glucose 154 (H) 74 - 99 mg/dL    Sodium 138 136 - 145 mmol/L    Potassium 4.0 3.5 - 5.3 mmol/L    Chloride 104 98 - 107 mmol/L    Bicarbonate 26 21 - 32 mmol/L    Anion Gap 12 10 - 20 mmol/L    Urea Nitrogen 17 6 - 23 mg/dL    Creatinine 1.09 0.50 - 1.30 mg/dL    eGFR 81 >60 mL/min/1.73m*2    Calcium 8.8 8.6 - 10.3 mg/dL    Albumin 4.1 3.4 - 5.0 g/dL    Alkaline Phosphatase 66 33 - 120 U/L    Total Protein 6.5 6.4 - 8.2 g/dL    AST 33 9 - 39 U/L    Bilirubin, Total 0.4 0.0 - 1.2 mg/dL    ALT 33 10 - 52 U/L   Troponin I, High Sensitivity, Initial   Result Value Ref Range    Troponin I, High Sensitivity 6 0 - 20 ng/L   TSH with reflex to Free T4 if abnormal   Result Value Ref Range    Thyroid Stimulating Hormone 1.26 0.44 - 3.98 mIU/L         Assessment/Plan   Assessment & Plan  SVT (supraventricular tachycardia) (CMS-HCC)  Recurrent episode.  Continue Coreg.  Continue amiodarone drip pending cardiology evaluation.  Repeat RFP and magnesium in the morning.  Currently normal  Telemetry.  Echocardiogram  Cardiology  consult; Case has been discussed  Chronic systolic congestive heart failure  No acute exacerbation  EF is in the 35% range.  Unknown if ischemic versus nonischemic, but the patient does have alcohol use and may be contributing  Alcohol abuse  Admits to drinking 2 tall, heavy alcohol beers with liquor after night shift.  Denies evidence of withdrawal.  This may be contributing to the arrhythmia  Continue thiamine.  CHARITY.           Nathan Benson, DO

## 2025-01-16 NOTE — ASSESSMENT & PLAN NOTE
Admits to drinking 2 tall, heavy alcohol beers with liquor after night shift.  Denies evidence of withdrawal.  This may be contributing to the arrhythmia  Continue thiamine.  CHARITY.

## 2025-01-16 NOTE — ED TRIAGE NOTES
Pt in ED from  with a C/C of SVT. Pt state that he start feeling, pressure/Palpitations about 1 hr ago

## 2025-01-16 NOTE — ED PROVIDER NOTES
HPI   Chief Complaint   Patient presents with    SVT       This is a 54-year-old male presenting to the emergency department for SVT.  Patient was previously in SVT for the first time in May 2024.  In the emergency department adenosine followed by cardioversion and amiodarone drip were tried without successful resolution of SVT, he was admitted to ICU and started on IV metoprolol.  Today, patient states about an hour prior to arrival he started to have palpitations.  He felt like he was back in SVT.  He denies associated chest pain, shortness of breath, nausea, vomiting or diaphoresis.  He denies any recent illnesses.  Patient went to urgent care and then EMS was called and brought patient to emergency department.      Please see HPI for pertinent positive and negative ROS.         Patient History   Past Medical History:   Diagnosis Date    Hypertension      No past surgical history on file.  No family history on file.  Social History     Tobacco Use    Smoking status: Every Day     Current packs/day: 2.00     Average packs/day: 2.0 packs/day for 35.0 years (70.1 ttl pk-yrs)     Types: Cigarettes     Start date: 1990    Smokeless tobacco: Former   Substance Use Topics    Alcohol use: Not on file    Drug use: Yes     Types: Marijuana       Physical Exam   ED Triage Vitals [01/16/25 1619]   Temp Heart Rate Resp BP   -- (!) 202 -- 91/76      Pulse Ox Temp src Heart Rate Source Patient Position   97 % -- -- --      BP Location FiO2 (%)     -- --       Physical Exam  GENERAL APPEARANCE: Awake and alert. No acute respiratory distress.   VITAL SIGNS: As per the nurses' triage record.  HEENT: Normocephalic, atraumatic.   NECK: Soft, nontender and supple  CHEST: Nontender to palpation. Clear to auscultation bilaterally.  Symmetric rise and fall of chest wall.   HEART: Clear S1 and S2. Regular rate and rhythm. Strong and equal pulses in the extremities.  ABDOMEN: Soft, nontender, nondistended  MUSCULOSKELETAL: The calves are  nontender to palpation. Full gross active range of motion. Ambulating on own with no acute difficulties  NEUROLOGICAL: Awake, alert and oriented x 3. Motor power intact in the upper and lower extremities. Sensation is intact to light touch in the upper and lower extremities. Patient answering questions appropriately.   IMMUNOLOGICAL: No lymphatic streaking noted  DERMATOLOGIC: Warm and dry   PYSCH: Cooperative with appropriate mood and affect.    ED Course & [facility]   ED Course as of 01/16/25 1846   Thu Jan 16, 2025   1615 EKG Time:1614  EKG Interpretation time:1615  EKG Interpretation: EKG shows supraventricular tachycardia with a rate of 190 obvious per minute, left axis deviation, QTc normal at 440, no evidence of STEMI.    EKG was interpreted by myself independently [JL]   1743 Patient was vomiting and converted out of SVT.  He is currently on amiodarone drip.  ICU did come and evaluate patient however while they were down in the emergency department patient converted out of SVT.  Therefore we will watch patient over the next 30 minutes and if he remains in normal sinus rhythm plan for admission to stepdown unit. []   1750 EKG Time:1738  EKG Interpretation time:1750  EKG Interpretation: EKG shows normal sinus rhythm with a rate of 92 bpm, QTc 484, no evidence of STEMI.    EKG was interpreted by myself independently [JL]      ED Course User Index  [JL] Satya Bhandari DO  [] Caitlin Valderrama PA-C         Diagnoses as of 01/16/25 1846   SVT (supraventricular tachycardia) (CMS-[facility])                 No data recorded     [place] Coma Scale Score: 15 (01/16/25 1639 : Skyler Elaine RN)                           Medical Decision Making  Parts of this chart have been completed using voice recognition software. Please excuse any errors of transcription.  My thought process and reason for plan has been formulated from the time that I saw the patient until the time of disposition and is not specific to one specific moment  during their visit and furthermore my MDM encompasses this entire chart and not only this text box.      HPI: Detailed above.    Exam: A medically appropriate exam performed, outlined above, given the known history and presentation.    History obtained from: Patient and EMS    EKG: See my supervising physician's EKG interpretation    Medications given during visit:  Medications   amiodarone (Nexterone) 150 mg in dextrose (iso)  mL (0 mg intravenous Stopped 1/16/25 1655)     Followed by   amiodarone (Nexterone) 360 mg in dextrose,iso-osm 200 mL (1.8 mg/mL) infusion (premix) (1 mg/min intravenous New Bag 1/16/25 1703)     Followed by   amiodarone (Nexterone) 360 mg in dextrose,iso-osm 200 mL (1.8 mg/mL) infusion (premix) (has no administration in time range)   acetaminophen (Tylenol) tablet 650 mg (has no administration in time range)     Or   acetaminophen (Tylenol) oral liquid 650 mg (has no administration in time range)     Or   acetaminophen (Tylenol) suppository 650 mg (has no administration in time range)   melatonin tablet 5 mg (has no administration in time range)   benzocaine-menthol (Cepastat Sore Throat) lozenge 1 lozenge (has no administration in time range)   folic acid (Folvite) tablet 1 mg (has no administration in time range)   multivitamin with minerals 1 tablet (has no administration in time range)   adenosine (Adenocard) injection 12 mg (12 mg intravenous Given 1/16/25 1616)   sodium chloride 0.9 % bolus 1,000 mL (0 mL intravenous Stopped 1/16/25 1641)   adenosine (Adenocard) injection 12 mg (12 mg intravenous Given 1/16/25 1622)        Diagnostic/tests  Labs Reviewed   CBC WITH AUTO DIFFERENTIAL - Abnormal       Result Value    WBC 11.2      nRBC 0.0      RBC 5.37      Hemoglobin 14.2      Hematocrit 42.6      MCV 79 (*)     MCH 26.4      MCHC 33.3      RDW 18.4 (*)     Platelets 502 (*)     Neutrophils % 57.6      Immature Granulocytes %, Automated 0.3      Lymphocytes % 30.2      Monocytes  % 7.9      Eosinophils % 2.9      Basophils % 1.1      Neutrophils Absolute 6.47      Immature Granulocytes Absolute, Automated 0.03      Lymphocytes Absolute 3.40      Monocytes Absolute 0.89      Eosinophils Absolute 0.33      Basophils Absolute 0.12 (*)    COMPREHENSIVE METABOLIC PANEL - Abnormal    Glucose 154 (*)     Sodium 138      Potassium 4.0      Chloride 104      Bicarbonate 26      Anion Gap 12      Urea Nitrogen 17      Creatinine 1.09      eGFR 81      Calcium 8.8      Albumin 4.1      Alkaline Phosphatase 66      Total Protein 6.5      AST 33      Bilirubin, Total 0.4      ALT 33     SERIAL TROPONIN-INITIAL - Normal    Troponin I, High Sensitivity 6      Narrative:     Less than 99th percentile of normal range cutoff-  Female and children under 18 years old <14 ng/L; Male <21 ng/L: Negative  Repeat testing should be performed if clinically indicated.     Female and children under 18 years old 14-50 ng/L; Male 21-50 ng/L:  Consistent with possible cardiac damage and possible increased clinical   risk. Serial measurements may help to assess extent of myocardial damage.     >50 ng/L: Consistent with cardiac damage, increased clinical risk and  myocardial infarction. Serial measurements may help assess extent of   myocardial damage.      NOTE: Children less than 1 year old may have higher baseline troponin   levels and results should be interpreted in conjunction with the overall   clinical context.     NOTE: Troponin I testing is performed using a different   testing methodology at [facility] than at other   [facility]. Direct result comparisons should only   be made within the same method.   TSH WITH REFLEX TO FREE T4 IF ABNORMAL - Normal    Thyroid Stimulating Hormone 1.26      Narrative:     TSH testing is performed using different testing methodology at [facility] than at other [facility]. Direct result comparisons should only be made within the same method.      TROPONIN SERIES- (INITIAL, 1 HR)    Narrative:     The following orders were created for panel order Troponin I Series, High Sensitivity (0, 1 HR).  Procedure                               Abnormality         Status                     ---------                               -----------         ------                     Troponin I, High Sensiti...[[id number]]  Normal              Final result               Troponin, High Sensitivi...[[id number]]                                                   Please view results for these tests on the individual orders.   SERIAL TROPONIN, 1 HOUR      XR chest 1 view   Final Result   No acute pulmonary pathology.   Signed by Catarino Ferrera MD      Transthoracic Echo (TTE) Complete    (Results Pending)        Considerations/further MDM:  Patient was seen in conjucntion with my supervising physician,  . Please refer to his note.    Patient presents for SVT.  Adenosine 6 mg IV and squad without resolution.  Patient was given 12 mg IV adenosine x 2 in the emergency department without resolution of SVT.  Cardiology was consulted, Dr. Barrett who recommended amiodarone bolus followed by amiodarone drip.  The ICU was initially consulted as patient was still in SVT.  However, patient did have episode of vomiting and her words converted out of SVT.  Laboratory evaluation is grossly unremarkable.  TSH not elevated, initial troponin I 6, CBC and CMP unremarkable.  He has remained out of SVT in the emergency department.  Therefore he was admitted to stepdown unit, accepting physician is Dr. Lopez.  Patient was agreeable to admission.  He was admitted in stable condition.      Procedure  Critical Care    Performed by: Caitlin Valderrama PA-C  Authorized by: Satya Bhandari DO    Critical care provider statement:     Critical care time (minutes):  34    Critical care time was exclusive of:  Separately billable procedures and treating other patients    Critical care was necessary  to treat or prevent imminent or life-threatening deterioration of the following conditions: SVT.    Critical care was time spent personally by me on the following activities:  Re-evaluation of patient's condition, ordering and review of laboratory studies, ordering and review of radiographic studies, ordering and performing treatments and interventions and evaluation of patient's response to treatment    Care discussed with: admitting provider         Caitlin Valderrama PA-C  01/16/25 5231

## 2025-01-16 NOTE — ASSESSMENT & PLAN NOTE
Recurrent episode.  Continue Coreg.  Continue amiodarone drip pending cardiology evaluation.  Repeat RFP and magnesium in the morning.  Currently normal  Telemetry.  Echocardiogram  Cardiology consult; Case has been discussed

## 2025-01-16 NOTE — ASSESSMENT & PLAN NOTE
No acute exacerbation  EF is in the 35% range.  Unknown if ischemic versus nonischemic, but the patient does have alcohol use and may be contributing

## 2025-01-16 NOTE — PROGRESS NOTES
Subjective   Patient ID: Los Rubio is a 54 y.o. male. They present today with a chief complaint of Palpitations (Today x heart racing ).    History of Present Illness  HPI    Past Medical History  Allergies as of 01/16/2025 - Reviewed 08/30/2024   Allergen Reaction Noted    Shellfish derived Anaphylaxis 05/21/2024       (Not in a hospital admission)       Past Medical History:   Diagnosis Date    Hypertension        No past surgical history on file.     reports that he has been smoking cigarettes. He started smoking about 35 years ago. He has a 70.1 pack-year smoking history. He has quit using smokeless tobacco. He reports current drug use. Drug: Marijuana.    Review of Systems  Review of Systems                               Objective    Vitals:    01/16/25 1555   BP: (!) 154/102   Pulse: (!) 210   SpO2: 97%     No LMP for male patient.    Physical Exam    Procedures    Point of Care Test & Imaging Results from this visit  No results found for this visit on 01/16/25.   No results found.    Diagnostic study results (if any) were reviewed by [facility].    Assessment/Plan   Allergies, medications, history, and pertinent labs/EKGs/Imaging reviewed by Crystal L Severino, APRN-CNP.     Medical Decision Making  54yr old male patient presents accompanied by his wife for chief complaint of heart racing.  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  He was awakened by his chest fluttering and palpitations.  Patient reports he has a history of HTN and did take all of his medications today.  Manual blood pressure is 154/102, HR is registering at 210 on the finger pulse ox.  Patient is complaining of sob and feeling dizzy.  I do attempt to get HR via auscultation but the heart it beating too fast and irregular it was hard to get an accurate count.  911 was called when patient's vitals were taken.  EMS arrives, report is given and patient leaves  via cart in stable condition.     Orders and Diagnoses  There are no diagnoses linked to this encounter.    Medical Admin Record      Patient disposition: ED    Electronically signed by [facility]  3:57 PM

## 2025-01-17 ENCOUNTER — APPOINTMENT (OUTPATIENT)
Dept: RADIOLOGY | Facility: HOSPITAL | Age: 55
DRG: 309 | End: 2025-01-17
Payer: COMMERCIAL

## 2025-01-17 ENCOUNTER — APPOINTMENT (OUTPATIENT)
Dept: CARDIOLOGY | Facility: HOSPITAL | Age: 55
DRG: 309 | End: 2025-01-17
Payer: COMMERCIAL

## 2025-01-17 ENCOUNTER — PHARMACY VISIT (OUTPATIENT)
Dept: PHARMACY | Facility: CLINIC | Age: 55
End: 2025-01-17
Payer: COMMERCIAL

## 2025-01-17 VITALS
WEIGHT: 160 LBS | HEIGHT: 71 IN | BODY MASS INDEX: 22.4 KG/M2 | OXYGEN SATURATION: 99 % | TEMPERATURE: 98.2 F | RESPIRATION RATE: 19 BRPM | SYSTOLIC BLOOD PRESSURE: 156 MMHG | HEART RATE: 91 BPM | DIASTOLIC BLOOD PRESSURE: 111 MMHG

## 2025-01-17 LAB
ALBUMIN SERPL BCP-MCNC: 4 G/DL (ref 3.4–5)
ALP SERPL-CCNC: 63 U/L (ref 33–120)
ALT SERPL W P-5'-P-CCNC: 36 U/L (ref 10–52)
ANION GAP SERPL CALCULATED.3IONS-SCNC: 9 MMOL/L (ref 10–20)
AORTIC VALVE MEAN GRADIENT: 4 MMHG
AORTIC VALVE PEAK VELOCITY: 1.28 M/S
AST SERPL W P-5'-P-CCNC: 27 U/L (ref 9–39)
AV PEAK GRADIENT: 7 MMHG
AVA (PEAK VEL): 3.19 CM2
AVA (VTI): 3.11 CM2
BASOPHILS # BLD AUTO: 0.07 X10*3/UL (ref 0–0.1)
BASOPHILS NFR BLD AUTO: 0.5 %
BILIRUB SERPL-MCNC: 0.4 MG/DL (ref 0–1.2)
BUN SERPL-MCNC: 15 MG/DL (ref 6–23)
CALCIUM SERPL-MCNC: 8.9 MG/DL (ref 8.6–10.3)
CARDIAC TROPONIN I PNL SERPL HS: 55 NG/L (ref 0–20)
CHLORIDE SERPL-SCNC: 104 MMOL/L (ref 98–107)
CO2 SERPL-SCNC: 28 MMOL/L (ref 21–32)
CREAT SERPL-MCNC: 0.94 MG/DL (ref 0.5–1.3)
EGFRCR SERPLBLD CKD-EPI 2021: >90 ML/MIN/1.73M*2
EJECTION FRACTION APICAL 4 CHAMBER: 52.7
EJECTION FRACTION: 63 %
EOSINOPHIL # BLD AUTO: 0.2 X10*3/UL (ref 0–0.7)
EOSINOPHIL NFR BLD AUTO: 1.5 %
ERYTHROCYTE [DISTWIDTH] IN BLOOD BY AUTOMATED COUNT: 18.6 % (ref 11.5–14.5)
GLUCOSE SERPL-MCNC: 92 MG/DL (ref 74–99)
HCT VFR BLD AUTO: 42.8 % (ref 41–52)
HGB BLD-MCNC: 14 G/DL (ref 13.5–17.5)
IMM GRANULOCYTES # BLD AUTO: 0.04 X10*3/UL (ref 0–0.7)
IMM GRANULOCYTES NFR BLD AUTO: 0.3 % (ref 0–0.9)
LEFT ATRIUM VOLUME AREA LENGTH INDEX BSA: 34.9 ML/M2
LEFT VENTRICLE INTERNAL DIMENSION DIASTOLE: 5.03 CM (ref 3.5–6)
LEFT VENTRICULAR OUTFLOW TRACT DIAMETER: 2.12 CM
LV EJECTION FRACTION BIPLANE: 51 %
LYMPHOCYTES # BLD AUTO: 3.52 X10*3/UL (ref 1.2–4.8)
LYMPHOCYTES NFR BLD AUTO: 27 %
MAGNESIUM SERPL-MCNC: 2.02 MG/DL (ref 1.6–2.4)
MCH RBC QN AUTO: 26.3 PG (ref 26–34)
MCHC RBC AUTO-ENTMCNC: 32.7 G/DL (ref 32–36)
MCV RBC AUTO: 80 FL (ref 80–100)
MITRAL VALVE E/A RATIO: 0.9
MITRAL VALVE E/E' RATIO: 9.97
MONOCYTES # BLD AUTO: 1.11 X10*3/UL (ref 0.1–1)
MONOCYTES NFR BLD AUTO: 8.5 %
NEUTROPHILS # BLD AUTO: 8.09 X10*3/UL (ref 1.2–7.7)
NEUTROPHILS NFR BLD AUTO: 62.2 %
NRBC BLD-RTO: 0 /100 WBCS (ref 0–0)
PLATELET # BLD AUTO: 456 X10*3/UL (ref 150–450)
POTASSIUM SERPL-SCNC: 4.1 MMOL/L (ref 3.5–5.3)
PROT SERPL-MCNC: 6.4 G/DL (ref 6.4–8.2)
RBC # BLD AUTO: 5.33 X10*6/UL (ref 4.5–5.9)
RIGHT VENTRICLE FREE WALL PEAK S': 14.33 CM/S
RIGHT VENTRICLE PEAK SYSTOLIC PRESSURE: 36.4 MMHG
SODIUM SERPL-SCNC: 137 MMOL/L (ref 136–145)
TRICUSPID ANNULAR PLANE SYSTOLIC EXCURSION: 2.7 CM
WBC # BLD AUTO: 13 X10*3/UL (ref 4.4–11.3)

## 2025-01-17 PROCEDURE — 93016 CV STRESS TEST SUPVJ ONLY: CPT | Performed by: INTERNAL MEDICINE

## 2025-01-17 PROCEDURE — RXMED WILLOW AMBULATORY MEDICATION CHARGE

## 2025-01-17 PROCEDURE — 2500000004 HC RX 250 GENERAL PHARMACY W/ HCPCS (ALT 636 FOR OP/ED)

## 2025-01-17 PROCEDURE — 78452 HT MUSCLE IMAGE SPECT MULT: CPT | Performed by: NUCLEAR MEDICINE

## 2025-01-17 PROCEDURE — 80053 COMPREHEN METABOLIC PANEL: CPT | Performed by: INTERNAL MEDICINE

## 2025-01-17 PROCEDURE — 2500000004 HC RX 250 GENERAL PHARMACY W/ HCPCS (ALT 636 FOR OP/ED): Performed by: INTERNAL MEDICINE

## 2025-01-17 PROCEDURE — 83735 ASSAY OF MAGNESIUM: CPT | Performed by: INTERNAL MEDICINE

## 2025-01-17 PROCEDURE — 3430000001 HC RX 343 DIAGNOSTIC RADIOPHARMACEUTICALS

## 2025-01-17 PROCEDURE — 93306 TTE W/DOPPLER COMPLETE: CPT | Performed by: INTERNAL MEDICINE

## 2025-01-17 PROCEDURE — 2500000002 HC RX 250 W HCPCS SELF ADMINISTERED DRUGS (ALT 637 FOR MEDICARE OP, ALT 636 FOR OP/ED)

## 2025-01-17 PROCEDURE — A9503 TC99M MEDRONATE: HCPCS

## 2025-01-17 PROCEDURE — 99223 1ST HOSP IP/OBS HIGH 75: CPT

## 2025-01-17 PROCEDURE — 84484 ASSAY OF TROPONIN QUANT: CPT | Performed by: PHYSICIAN ASSISTANT

## 2025-01-17 PROCEDURE — 93017 CV STRESS TEST TRACING ONLY: CPT

## 2025-01-17 PROCEDURE — 93306 TTE W/DOPPLER COMPLETE: CPT

## 2025-01-17 PROCEDURE — 93018 CV STRESS TEST I&R ONLY: CPT | Performed by: INTERNAL MEDICINE

## 2025-01-17 PROCEDURE — 85025 COMPLETE CBC W/AUTO DIFF WBC: CPT | Performed by: INTERNAL MEDICINE

## 2025-01-17 PROCEDURE — A9502 TC99M TETROFOSMIN: HCPCS

## 2025-01-17 PROCEDURE — 36415 COLL VENOUS BLD VENIPUNCTURE: CPT | Performed by: INTERNAL MEDICINE

## 2025-01-17 PROCEDURE — 78452 HT MUSCLE IMAGE SPECT MULT: CPT

## 2025-01-17 RX ORDER — CARVEDILOL 6.25 MG/1
6.25 TABLET ORAL
Qty: 180 TABLET | Refills: 0 | Status: SHIPPED | OUTPATIENT
Start: 2025-01-17

## 2025-01-17 RX ORDER — CARVEDILOL 6.25 MG/1
6.25 TABLET ORAL
Status: DISCONTINUED | OUTPATIENT
Start: 2025-01-17 | End: 2025-01-17 | Stop reason: HOSPADM

## 2025-01-17 RX ORDER — REGADENOSON 0.08 MG/ML
0.4 INJECTION, SOLUTION INTRAVENOUS
Status: COMPLETED | OUTPATIENT
Start: 2025-01-17 | End: 2025-01-17

## 2025-01-17 RX ORDER — LANOLIN ALCOHOL/MO/W.PET/CERES
100 CREAM (GRAM) TOPICAL DAILY
Status: DISCONTINUED | OUTPATIENT
Start: 2025-01-17 | End: 2025-01-17 | Stop reason: HOSPADM

## 2025-01-17 RX ORDER — SPIRONOLACTONE 25 MG/1
12.5 TABLET ORAL DAILY
Qty: 45 TABLET | Refills: 0 | Status: SHIPPED | OUTPATIENT
Start: 2025-01-17

## 2025-01-17 RX ORDER — AMIODARONE HYDROCHLORIDE 200 MG/1
200 TABLET ORAL DAILY
Status: DISCONTINUED | OUTPATIENT
Start: 2025-01-17 | End: 2025-01-17 | Stop reason: HOSPADM

## 2025-01-17 RX ORDER — AMIODARONE HYDROCHLORIDE 200 MG/1
200 TABLET ORAL DAILY
Qty: 90 TABLET | Refills: 0 | Status: SHIPPED | OUTPATIENT
Start: 2025-01-18

## 2025-01-17 RX ORDER — AMINOPHYLLINE 25 MG/ML
125 INJECTION, SOLUTION INTRAVENOUS AS NEEDED
Status: CANCELLED | OUTPATIENT
Start: 2025-01-17

## 2025-01-17 RX ORDER — LANOLIN ALCOHOL/MO/W.PET/CERES
100 CREAM (GRAM) TOPICAL DAILY
Qty: 90 TABLET | Refills: 0 | Status: SHIPPED | OUTPATIENT
Start: 2025-01-17

## 2025-01-17 RX ADMIN — AMIODARONE HYDROCHLORIDE 200 MG: 200 TABLET ORAL at 13:01

## 2025-01-17 RX ADMIN — REGADENOSON 0.4 MG: 0.08 INJECTION, SOLUTION INTRAVENOUS at 09:40

## 2025-01-17 RX ADMIN — AMIODARONE HYDROCHLORIDE 0.5 MG/MIN: 1.8 INJECTION, SOLUTION INTRAVENOUS at 11:24

## 2025-01-17 RX ADMIN — TETROFOSMIN 35.2 MILLICURIE: 0.23 INJECTION, POWDER, LYOPHILIZED, FOR SOLUTION INTRAVENOUS at 09:40

## 2025-01-17 RX ADMIN — TECHNETIUM TC 99M MEDRONATE 11.1 MILLICURIE: 25 INJECTION, POWDER, FOR SOLUTION INTRAVENOUS at 08:27

## 2025-01-17 ASSESSMENT — PAIN SCALES - GENERAL: PAINLEVEL_OUTOF10: 0 - NO PAIN

## 2025-01-17 ASSESSMENT — ENCOUNTER SYMPTOMS: PALPITATIONS: 1

## 2025-01-17 NOTE — PROGRESS NOTES
Los Rubio is a 54 y.o. male admitted for SVT (supraventricular tachycardia) (CMS-HCC). Pharmacy reviewed the patient's [id number] medications and allergies for accuracy.    Medications ADDED:  albuterol 2.5 mg /3 mL (0.083 %) nebulizer solution  labetalol (Normodyne) 200 mg tablet  sacubitriL-valsartan (Entresto)  mg tablet  ibuprofen 200 mg tablet  Medications CHANGED:  None  Medications REMOVED:   thiamine (Vitamin B-1) 100 mg tablet   acetaminophen (Tylenol) 325 mg tablet  carvedilol (Coreg) 6.25 mg tablet  ondansetron ODT (Zofran-ODT) 4 mg disintegrating tablet  sacubitriL-valsartan (Entresto) 24-26 mg tablet    The list below reflects the updated PTA list. Comments regarding how patient may be taking medications differently can be found in the Admit Orders Activity  Prior to Admission Medications   Prescriptions Last Dose Informant   albuterol 2.5 mg /3 mL (0.083 %) nebulizer solution 1/15/2025 Self, Family Member   Sig: Take 3 mL (2.5 mg) by nebulization 4 times a day.   albuterol 90 mcg/actuation inhaler PRN Self, Family Member   Sig: Inhale 1 puff every 6 hours if needed for wheezing   or shortness of breath.      ibuprofen 200 mg tablet 1/15/2025 Bedtime Self, Family Member   Sig: Take 1-3 tablets (200-600 mg) by mouth every 6   hours if needed (pain).   labetalol (Normodyne) 200 mg tablet 1/11/2025 Self, Family Member   Sig: Take 1 tablet (200 mg) by mouth 2 times a day.      sacubitriL-valsartan (Entresto)  mg tablet 1/16/2025 Morning Self, Family Member   Sig: Take 1 tablet by mouth 2 times a day.   spironolactone (Aldactone) 25 mg tablet 1/16/2025 Morning Self, Family Member   Sig: Take 0.5 tablets (12.5 mg) by mouth once daily.      Facility-Administered Medications: None        The list below reflects the updated allergy list. Please review each documented allergy for additional clarification and justification.  Allergies  Reviewed by Gumaro Merrill on 1/16/2025         Severity Reactions Comments    Shellfish Derived High Anaphylaxis             Pharmacy has been updated to [facility].    Sources used to complete the med history include:   Patient and family member interviewed, good historians, dispense report, care everywhere, chart review history    Below are additional concerns with the patient's PTA list.  None    Gumaro Merrill, Maria Alejandra  Please reach out via [employer] Secure Chat for questions

## 2025-01-17 NOTE — ED NOTES
" Los Rubio is a 54 y.o. male presenting with palpitations.  Patient has a history of supraventricular tachycardia and systolic heart failure, EF earlier this year in May being 35%.  Patient was lying down and doing anything in particular when he developed palpitations.  No chest pain.  Denies any recent illness.  He became more lightheaded, and eventually went to the urgent care, he was found to have a heart rate in the 200s, and EMS was called.  He was given adenosine 6 mg by EMS, and 12 mg x 2 by ER without improvement.  After discussion with cardiology, amiodarone drip was started, the patient actually had a episode of emesis, and after retching converted back to sinus rhythm.  Patient feels well overall.  Currently amiodarone drip.  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     PMHX:  Past Medical History:   Diagnosis Date    Hypertension         Allergies   Allergen Reactions    Shellfish Derived Anaphylaxis         LABS:   Latest Reference Range & Units 01/16/25 16:37   GLUCOSE 74 - 99 mg/dL 154 (H)   SODIUM 136 - 145 mmol/L 138   POTASSIUM 3.5 - 5.3 mmol/L 4.0   CHLORIDE 98 - 107 mmol/L 104   Bicarbonate 21 - 32 mmol/L 26   Anion Gap 10 - 20 mmol/L 12   Blood Urea Nitrogen 6 - 23 mg/dL 17   Creatinine 0.50 - 1.30 mg/dL 1.09   EGFR >60 mL/min/1.73m*2 81   Calcium 8.6 - 10.3 mg/dL 8.8   Albumin 3.4 - 5.0 g/dL 4.1   Alkaline Phosphatase 33 - 120 U/L 66   ALT 10 - 52 U/L 33   AST 9 - 39 U/L 33   Bilirubin Total 0.0 - 1.2 mg/dL 0.4   Total Protein 6.4 - 8.2 g/dL 6.5   Troponin I, High Sensitivity 0 - 20 ng/L 6   Thyroid Stimulating Hormone 0.44 - 3.98 mIU/L 1.26   (H): Data is abnormally high   Latest Reference Range & Units 01/16/25 16:37   WBC 4.4 - 11.3 x10*3/uL 11.2   nRBC 0.0 - 0.0 /100 WBCs 0.0   RBC 4.50 - 5.90 x10*6/uL 5.37   HEMOGLOBIN 13.5 - 17.5 g/dL 14.2   HEMATOCRIT 41.0 - 52.0 % 42.6   MCV 80 - 100 fL 79 (L)   MCH 26.0 - 34.0 pg 26.4   MCHC 32.0 " - 36.0 g/dL 33.3   RED CELL DISTRIBUTION WIDTH 11.5 - 14.5 % 18.4 (H)   Platelets 150 - 450 x10*3/uL 502 (H)   (L): Data is abnormally low  (H): Data is abnormally high          PLAN: ADMIT                   Maria R Diamond, ELISHA  01/16/25 1818

## 2025-01-17 NOTE — CONSULTS
Inpatient consult to Cardiology  Consult performed by: Celena Almanzar, MAEGAN-CNP  Consult ordered by: Nathan Benson DO  Reason for consult: SVT        History Of Present Illness:    Los Rubio is a 54 y.o. male presenting with palpitations. He was previously seen by Dr. Mtz for cardiology but was lost to follow up. Past medical history includes supraventricular tachycardia, Cardiomyopathy (EF 35%), hypertensive disorder and alcohol use. Yesterday the patient reports that he was lying down and developed palpitations. Denies chest pain or pressure. He does report becoming lightheaded and eventually presented to Urgent Care. At the Urgent Care he was found to have tachycardia with rates in the 200s and EMS were called.  He was given 6 mg of adenosine per EMS with no improvement.  He presented to [facility] Emergency Department.  Initial EKG revealing supraventricular tachycardia with a rate of 199 bpm.  He was given 12 mg of adenosine x 2 by the emergency department without improvement of his rates.  The on-call cardiologist was called and amiodarone drip was started, the patient had an episode of emesis and converted back to sinus rhythm.  Lab work revealed sodium of 138, testing 4.0, creatinine 1.09 and hemoglobin 14.2.  Initial troponin negative at 6.  TSH normal at 1.26.  Chest x-ray negative for acute cardiopulmonary pathology.  The patient was admitted to the hospital on telemetry for further assessment and management.    Review of Systems   Cardiovascular:  Positive for palpitations. Negative for chest pain and leg swelling.   All other systems reviewed and are negative.        Last Recorded Vitals:  Vitals:    01/17/25 0130 01/17/25 0145 01/17/25 0415 01/17/25 0615   BP:   110/87 115/78   BP Location:       Patient Position:       Pulse: 69 72 62 60   Resp: (!) 30 18 19 (!) 21   Temp:    36.8 °C (98.2 °F)   SpO2: 96% 97% 98% 99%   Weight:       Height:           Last Labs:  CBC - 1/16/2025:   "4:37 PM  11.2 14.2 502    42.6      CMP - 1/16/2025:  4:37 PM  8.8 6.5 33 --- 0.4   4.7 4.1 33 66      PTT - 5/22/2024:  4:30 AM  1.0   10.8 26.5     Troponin I, High Sensitivity   Date/Time Value Ref Range Status   01/16/2025 04:37 PM 6 0 - 20 ng/L Final     Hemoglobin A1C   Date/Time Value Ref Range Status   05/21/2024 05:26 AM 5.7 (H) See below % Final     LDL Calculated   Date/Time Value Ref Range Status   06/25/2020 07:53 PM 84 65 - 130 MG/DL Final      Last I/O:  No intake/output data recorded.    Past Cardiology Tests (Last 3 Years):  EKG:  Electrocardiogram, 12-lead PRN ACS symptoms 05/21/2024      Electrocardiogram, 12-lead PRN ACS symptoms     Echo:  Transthoracic Echo (TTE) Complete 05/21/2024    Ejection Fractions:  No results found for: \"EF\"  Cath:  No results found for this or any previous visit from the past 1095 days.    Stress Test:  No results found for this or any previous visit from the past 1095 days.    Cardiac Imaging:  No results found for this or any previous visit from the past 1095 days.      Past Medical History:  He has a past medical history of Hypertension.    Past Surgical History:  He has no past surgical history on file.      Social History:  He reports that he has been smoking cigarettes. He started smoking about 35 years ago. He has a 70.1 pack-year smoking history. He has quit using smokeless tobacco. He reports current drug use. Drug: Marijuana. No history on file for alcohol use.    Family History:  No family history on file.     Allergies:  Shellfish derived    Inpatient Medications:  Scheduled medications   Medication Dose Route Frequency    carvedilol  6.25 mg oral BID    folic acid  1 mg oral Daily    multivitamin with minerals  1 tablet oral Daily    thiamine  100 mg oral Daily     PRN medications   Medication    acetaminophen    Or    acetaminophen    Or    acetaminophen    benzocaine-menthol    melatonin     Continuous Medications   Medication Dose Last Rate    " amiodarone  0.5 mg/min 0.5 mg/min (01/17/25 0645)     Outpatient Medications:  Current Outpatient Medications   Medication Instructions    acetaminophen (TYLENOL) 650 mg, oral, Every 6 hours PRN    albuterol 90 mcg/actuation inhaler 1 puff, inhalation, Every 6 hours PRN    albuterol 2.5 mg, 4 times daily    carvedilol (COREG) 6.25 mg, oral, 2 times daily    ibuprofen 200 mg tablet 1-3 tablets, Every 6 hours PRN    labetalol (NORMODYNE) 200 mg, oral, 2 times daily    ondansetron ODT (ZOFRAN-ODT) 4 mg, oral, Every 8 hours PRN    sacubitriL-valsartan (Entresto) 24-26 mg tablet 1 tablet, oral, 2 times daily    sacubitriL-valsartan (Entresto) 24-26 mg tablet Take 1 tablet by mouth twice a day    sacubitriL-valsartan (Entresto)  mg tablet 1 tablet, 2 times daily    spironolactone (ALDACTONE) 12.5 mg, oral, Daily    thiamine (VITAMIN B-1) 100 mg, oral, Daily       Physical Exam  Vitals reviewed.   HENT:      Head: Normocephalic and atraumatic.   Cardiovascular:      Rate and Rhythm: Normal rate and regular rhythm.      Heart sounds: No murmur heard.     No friction rub. No gallop.   Pulmonary:      Effort: Pulmonary effort is normal.      Breath sounds: Normal breath sounds. No wheezing, rhonchi or rales.   Abdominal:      General: Bowel sounds are normal.      Palpations: Abdomen is soft.   Musculoskeletal:      Right lower leg: No edema.      Left lower leg: No edema.   Skin:     General: Skin is warm and dry.      Capillary Refill: Capillary refill takes less than 2 seconds.      Findings: No bruising, erythema or lesion.   Neurological:      Mental Status: He is alert and oriented to person, place, and time.   Psychiatric:         Mood and Affect: Mood normal.         Behavior: Behavior normal.            Assessment/Plan   Supraventricular Tachycardia   Cardiomyopathy (EF 35%)  Hypertensive Disorder  Alcohol Use  Medical Noncompliance    Impression and Plan:    1/17: As described above.  Patient with a history of  supraventricular tachycardia was sent from urgent care after being found with heart rates in the 200s.  The patient's rhythm did not convert with 3 doses of adenosine, he was started on amiodarone drip, vomited and converted to normal sinus rhythm. He was seen previously by Dr. Mtz for cardiology as well as Dr. Hollingsworth for electrophysiology.  This morning he is resting comfortably in bed and having a bedside echocardiogram completed.  He denies chest pain, pressure or palpitations.  He is breathing comfortably on room air with pulse oximetry of 99%.  Appears euvolemic on examination.  Blood pressure 115/78.  Of note, he had an echocardiogram last May revealing ejection fraction of 35%, trace mitral valve regurgitation, trace tricuspid regurgitation and global hypokinesis of left ventricle with minor regional variations.  It was thought at this time that his cardiomyopathy was likely nonischemic due to his alcohol use and uncontrolled hypertension.  In order to rule out ischemic cardiomyopathy I ordered a Kaitlynn scan stress test as a noninvasive ischemic evaluation.  Additionally, I have ordered an electrophysiology consult for their recommendations regarding antiarrhythmic strategies for the patient's supraventricular tachycardia. Will await results of echocardiogram and stress test as well as electrophysiology recommendations and follow this patient with you.     Addendum 1342: Nuclear stress test with no evidence of stress induced ischemia or prior myocardial infarction. Echocardiogram with improvement to LV function with an ejection fraction of 60-65%. At this time with restoration of normal sinus rhythm, will discontinue amiodarone infusion and begin the patient on 200 mg amiodarone daily as an antiarrhythmic strategy. No objections from the cardiac perspective for discharge home this afternoon. He should follow up with Dr. Hollingsworth in the outpatient setting to discuss catheter based therapy for  his SVT.        Code Status:  Full Code          Celena Almanzar, APRN-CNP

## 2025-01-17 NOTE — DISCHARGE INSTRUCTIONS
"Please take the new medications as prescribed.    Please establish with cardiology and electrophysiology.  These numbers have been provided.    Please cut down drinking.  Two \"tall ice\" beers along with liquor will be equivalent to about 6 alcoholic drinks which is too much, and I think may contribute to your heart rhythm.      "

## 2025-01-17 NOTE — ED PROCEDURE NOTE
Procedure  Critical Care    Performed by: Satya Bhandari DO  Authorized by: Satya Bhandari DO    Critical care provider statement:     Critical care time (minutes):  32    Critical care time was exclusive of:  Separately billable procedures and treating other patients    Critical care was necessary to treat or prevent imminent or life-threatening deterioration of the following conditions:  Cardiac failure    Critical care was time spent personally by me on the following activities:  Examination of patient, evaluation of patient's response to treatment, discussions with consultants, re-evaluation of patient's condition, pulse oximetry, ordering and review of radiographic studies, ordering and review of laboratory studies, ordering and performing treatments and interventions and review of old charts    Care discussed with: admitting provider                 Satya Bhandari DO  01/16/25 [id number]

## 2025-01-23 LAB
Q ONSET: 217 MS
QRS COUNT: 32 BEATS
QRS DURATION: 70 MS
QT INTERVAL: 242 MS
QTC CALCULATION(BAZETT): 440 MS
QTC FREDERICIA: 360 MS
R AXIS: -19 DEGREES
T AXIS: 24 DEGREES
T OFFSET: 338 MS
VENTRICULAR RATE: 199 BPM

## 2025-01-30 ENCOUNTER — APPOINTMENT (OUTPATIENT)
Dept: CARDIOLOGY | Facility: HOSPITAL | Age: 55
DRG: 309 | End: 2025-01-30
Payer: COMMERCIAL

## 2025-01-30 ENCOUNTER — HOSPITAL ENCOUNTER (INPATIENT)
Facility: HOSPITAL | Age: 55
LOS: 1 days | Discharge: HOME | DRG: 309 | End: 2025-01-31
Attending: EMERGENCY MEDICINE | Admitting: INTERNAL MEDICINE
Payer: COMMERCIAL

## 2025-01-30 ENCOUNTER — APPOINTMENT (OUTPATIENT)
Dept: RADIOLOGY | Facility: HOSPITAL | Age: 55
DRG: 309 | End: 2025-01-30
Payer: COMMERCIAL

## 2025-01-30 DIAGNOSIS — I10 DIASTOLIC HYPERTENSION: ICD-10-CM

## 2025-01-30 DIAGNOSIS — R06.00 DYSPNEA, UNSPECIFIED TYPE: ICD-10-CM

## 2025-01-30 DIAGNOSIS — I47.10 SVT (SUPRAVENTRICULAR TACHYCARDIA) (CMS-HCC): Primary | ICD-10-CM

## 2025-01-30 DIAGNOSIS — R00.2 PALPITATIONS: ICD-10-CM

## 2025-01-30 LAB
ALBUMIN SERPL BCP-MCNC: 4.3 G/DL (ref 3.4–5)
ALP SERPL-CCNC: 64 U/L (ref 33–120)
ALT SERPL W P-5'-P-CCNC: 21 U/L (ref 10–52)
ANION GAP SERPL CALCULATED.3IONS-SCNC: 12 MMOL/L (ref 10–20)
APPEARANCE UR: CLEAR
AST SERPL W P-5'-P-CCNC: 21 U/L (ref 9–39)
BASOPHILS # BLD AUTO: 0.14 X10*3/UL (ref 0–0.1)
BASOPHILS NFR BLD AUTO: 0.9 %
BILIRUB SERPL-MCNC: 0.5 MG/DL (ref 0–1.2)
BILIRUB UR STRIP.AUTO-MCNC: NEGATIVE MG/DL
BNP SERPL-MCNC: 170 PG/ML (ref 0–99)
BUN SERPL-MCNC: 19 MG/DL (ref 6–23)
CALCIUM SERPL-MCNC: 8.9 MG/DL (ref 8.6–10.3)
CARDIAC TROPONIN I PNL SERPL HS: 7 NG/L (ref 0–20)
CARDIAC TROPONIN I PNL SERPL HS: 8 NG/L (ref 0–20)
CHLORIDE SERPL-SCNC: 105 MMOL/L (ref 98–107)
CO2 SERPL-SCNC: 24 MMOL/L (ref 21–32)
COLOR UR: ABNORMAL
CREAT SERPL-MCNC: 0.99 MG/DL (ref 0.5–1.3)
EGFRCR SERPLBLD CKD-EPI 2021: >90 ML/MIN/1.73M*2
EOSINOPHIL # BLD AUTO: 0.18 X10*3/UL (ref 0–0.7)
EOSINOPHIL NFR BLD AUTO: 1.1 %
ERYTHROCYTE [DISTWIDTH] IN BLOOD BY AUTOMATED COUNT: 17.6 % (ref 11.5–14.5)
FLUAV RNA RESP QL NAA+PROBE: NOT DETECTED
FLUBV RNA RESP QL NAA+PROBE: NOT DETECTED
GLUCOSE SERPL-MCNC: 165 MG/DL (ref 74–99)
GLUCOSE UR STRIP.AUTO-MCNC: NORMAL MG/DL
HCT VFR BLD AUTO: 42.3 % (ref 41–52)
HGB BLD-MCNC: 14.5 G/DL (ref 13.5–17.5)
HOLD SPECIMEN: NORMAL
IMM GRANULOCYTES # BLD AUTO: 0.05 X10*3/UL (ref 0–0.7)
IMM GRANULOCYTES NFR BLD AUTO: 0.3 % (ref 0–0.9)
KETONES UR STRIP.AUTO-MCNC: NEGATIVE MG/DL
LACTATE SERPL-SCNC: 1.2 MMOL/L (ref 0.4–2)
LACTATE SERPL-SCNC: 2.1 MMOL/L (ref 0.4–2)
LEUKOCYTE ESTERASE UR QL STRIP.AUTO: NEGATIVE
LIPASE SERPL-CCNC: 17 U/L (ref 9–82)
LYMPHOCYTES # BLD AUTO: 3.54 X10*3/UL (ref 1.2–4.8)
LYMPHOCYTES NFR BLD AUTO: 22.6 %
MCH RBC QN AUTO: 26.9 PG (ref 26–34)
MCHC RBC AUTO-ENTMCNC: 34.3 G/DL (ref 32–36)
MCV RBC AUTO: 79 FL (ref 80–100)
MONOCYTES # BLD AUTO: 0.85 X10*3/UL (ref 0.1–1)
MONOCYTES NFR BLD AUTO: 5.4 %
NEUTROPHILS # BLD AUTO: 10.92 X10*3/UL (ref 1.2–7.7)
NEUTROPHILS NFR BLD AUTO: 69.7 %
NITRITE UR QL STRIP.AUTO: NEGATIVE
NRBC BLD-RTO: 0 /100 WBCS (ref 0–0)
PH UR STRIP.AUTO: 6 [PH]
PLATELET # BLD AUTO: 488 X10*3/UL (ref 150–450)
POTASSIUM SERPL-SCNC: 4.3 MMOL/L (ref 3.5–5.3)
PROT SERPL-MCNC: 6.7 G/DL (ref 6.4–8.2)
PROT UR STRIP.AUTO-MCNC: NEGATIVE MG/DL
RBC # BLD AUTO: 5.39 X10*6/UL (ref 4.5–5.9)
RBC # UR STRIP.AUTO: NEGATIVE /UL
SARS-COV-2 RNA RESP QL NAA+PROBE: NOT DETECTED
SODIUM SERPL-SCNC: 137 MMOL/L (ref 136–145)
SP GR UR STRIP.AUTO: 1.04
TSH SERPL-ACNC: 1.75 MIU/L (ref 0.44–3.98)
UROBILINOGEN UR STRIP.AUTO-MCNC: NORMAL MG/DL
WBC # BLD AUTO: 15.7 X10*3/UL (ref 4.4–11.3)

## 2025-01-30 PROCEDURE — 2500000001 HC RX 250 WO HCPCS SELF ADMINISTERED DRUGS (ALT 637 FOR MEDICARE OP): Performed by: INTERNAL MEDICINE

## 2025-01-30 PROCEDURE — 80053 COMPREHEN METABOLIC PANEL: CPT | Performed by: EMERGENCY MEDICINE

## 2025-01-30 PROCEDURE — 85025 COMPLETE CBC W/AUTO DIFF WBC: CPT | Performed by: EMERGENCY MEDICINE

## 2025-01-30 PROCEDURE — 2550000001 HC RX 255 CONTRASTS: Performed by: EMERGENCY MEDICINE

## 2025-01-30 PROCEDURE — 36415 COLL VENOUS BLD VENIPUNCTURE: CPT | Performed by: EMERGENCY MEDICINE

## 2025-01-30 PROCEDURE — 2500000001 HC RX 250 WO HCPCS SELF ADMINISTERED DRUGS (ALT 637 FOR MEDICARE OP): Performed by: REGISTERED NURSE

## 2025-01-30 PROCEDURE — G0378 HOSPITAL OBSERVATION PER HR: HCPCS

## 2025-01-30 PROCEDURE — 1210000001 HC SEMI-PRIVATE ROOM DAILY

## 2025-01-30 PROCEDURE — 99221 1ST HOSP IP/OBS SF/LOW 40: CPT | Performed by: REGISTERED NURSE

## 2025-01-30 PROCEDURE — 2500000004 HC RX 250 GENERAL PHARMACY W/ HCPCS (ALT 636 FOR OP/ED)

## 2025-01-30 PROCEDURE — 83690 ASSAY OF LIPASE: CPT | Performed by: EMERGENCY MEDICINE

## 2025-01-30 PROCEDURE — 81003 URINALYSIS AUTO W/O SCOPE: CPT | Performed by: EMERGENCY MEDICINE

## 2025-01-30 PROCEDURE — 87636 SARSCOV2 & INF A&B AMP PRB: CPT | Performed by: EMERGENCY MEDICINE

## 2025-01-30 PROCEDURE — 2500000004 HC RX 250 GENERAL PHARMACY W/ HCPCS (ALT 636 FOR OP/ED): Performed by: EMERGENCY MEDICINE

## 2025-01-30 PROCEDURE — 2500000002 HC RX 250 W HCPCS SELF ADMINISTERED DRUGS (ALT 637 FOR MEDICARE OP, ALT 636 FOR OP/ED): Performed by: INTERNAL MEDICINE

## 2025-01-30 PROCEDURE — 84443 ASSAY THYROID STIM HORMONE: CPT | Performed by: EMERGENCY MEDICINE

## 2025-01-30 PROCEDURE — 2500000002 HC RX 250 W HCPCS SELF ADMINISTERED DRUGS (ALT 637 FOR MEDICARE OP, ALT 636 FOR OP/ED): Performed by: EMERGENCY MEDICINE

## 2025-01-30 PROCEDURE — 83880 ASSAY OF NATRIURETIC PEPTIDE: CPT | Performed by: EMERGENCY MEDICINE

## 2025-01-30 PROCEDURE — 96374 THER/PROPH/DIAG INJ IV PUSH: CPT

## 2025-01-30 PROCEDURE — 99221 1ST HOSP IP/OBS SF/LOW 40: CPT | Performed by: INTERNAL MEDICINE

## 2025-01-30 PROCEDURE — 83605 ASSAY OF LACTIC ACID: CPT | Performed by: EMERGENCY MEDICINE

## 2025-01-30 PROCEDURE — 84484 ASSAY OF TROPONIN QUANT: CPT | Performed by: EMERGENCY MEDICINE

## 2025-01-30 PROCEDURE — 96376 TX/PRO/DX INJ SAME DRUG ADON: CPT

## 2025-01-30 PROCEDURE — 99291 CRITICAL CARE FIRST HOUR: CPT

## 2025-01-30 PROCEDURE — 71275 CT ANGIOGRAPHY CHEST: CPT | Mod: FOREIGN READ | Performed by: RADIOLOGY

## 2025-01-30 PROCEDURE — 96361 HYDRATE IV INFUSION ADD-ON: CPT

## 2025-01-30 PROCEDURE — 71275 CT ANGIOGRAPHY CHEST: CPT

## 2025-01-30 PROCEDURE — 93005 ELECTROCARDIOGRAM TRACING: CPT

## 2025-01-30 PROCEDURE — 94640 AIRWAY INHALATION TREATMENT: CPT

## 2025-01-30 RX ORDER — METOPROLOL TARTRATE 1 MG/ML
5 INJECTION, SOLUTION INTRAVENOUS ONCE
Status: COMPLETED | OUTPATIENT
Start: 2025-01-30 | End: 2025-01-30

## 2025-01-30 RX ORDER — CARVEDILOL 6.25 MG/1
6.25 TABLET ORAL
Status: DISCONTINUED | OUTPATIENT
Start: 2025-01-30 | End: 2025-01-31 | Stop reason: HOSPADM

## 2025-01-30 RX ORDER — AMIODARONE HYDROCHLORIDE 200 MG/1
400 TABLET ORAL 2 TIMES DAILY
Status: DISCONTINUED | OUTPATIENT
Start: 2025-01-30 | End: 2025-01-30

## 2025-01-30 RX ORDER — IPRATROPIUM BROMIDE AND ALBUTEROL SULFATE 2.5; .5 MG/3ML; MG/3ML
3 SOLUTION RESPIRATORY (INHALATION) ONCE
Status: DISCONTINUED | OUTPATIENT
Start: 2025-01-30 | End: 2025-01-31 | Stop reason: HOSPADM

## 2025-01-30 RX ORDER — IPRATROPIUM BROMIDE AND ALBUTEROL SULFATE 2.5; .5 MG/3ML; MG/3ML
3 SOLUTION RESPIRATORY (INHALATION) ONCE
Status: DISCONTINUED | OUTPATIENT
Start: 2025-01-30 | End: 2025-01-30

## 2025-01-30 RX ORDER — LOSARTAN POTASSIUM 25 MG/1
25 TABLET ORAL DAILY
Status: DISCONTINUED | OUTPATIENT
Start: 2025-01-30 | End: 2025-01-31 | Stop reason: HOSPADM

## 2025-01-30 RX ORDER — LANOLIN ALCOHOL/MO/W.PET/CERES
100 CREAM (GRAM) TOPICAL DAILY
Status: DISCONTINUED | OUTPATIENT
Start: 2025-01-30 | End: 2025-01-31 | Stop reason: HOSPADM

## 2025-01-30 RX ORDER — METOPROLOL TARTRATE 1 MG/ML
5 INJECTION, SOLUTION INTRAVENOUS EVERY 2 HOUR PRN
Status: DISCONTINUED | OUTPATIENT
Start: 2025-01-30 | End: 2025-01-31 | Stop reason: HOSPADM

## 2025-01-30 RX ORDER — IPRATROPIUM BROMIDE AND ALBUTEROL SULFATE 2.5; .5 MG/3ML; MG/3ML
3 SOLUTION RESPIRATORY (INHALATION) EVERY 2 HOUR PRN
Status: DISCONTINUED | OUTPATIENT
Start: 2025-01-30 | End: 2025-01-31 | Stop reason: HOSPADM

## 2025-01-30 RX ORDER — FLUTICASONE FUROATE AND VILANTEROL 200; 25 UG/1; UG/1
1 POWDER RESPIRATORY (INHALATION)
Status: DISCONTINUED | OUTPATIENT
Start: 2025-01-31 | End: 2025-01-31 | Stop reason: HOSPADM

## 2025-01-30 RX ADMIN — LOSARTAN POTASSIUM 25 MG: 25 TABLET, FILM COATED ORAL at 20:56

## 2025-01-30 RX ADMIN — IPRATROPIUM BROMIDE AND ALBUTEROL SULFATE 3 ML: 2.5; .5 SOLUTION RESPIRATORY (INHALATION) at 19:29

## 2025-01-30 RX ADMIN — IPRATROPIUM BROMIDE AND ALBUTEROL SULFATE 3 ML: 2.5; .5 SOLUTION RESPIRATORY (INHALATION) at 11:06

## 2025-01-30 RX ADMIN — METOPROLOL TARTRATE 5 MG: 5 INJECTION INTRAVENOUS at 10:50

## 2025-01-30 RX ADMIN — AMIODARONE HYDROCHLORIDE 400 MG: 200 TABLET ORAL at 13:14

## 2025-01-30 RX ADMIN — SODIUM CHLORIDE 500 ML: 900 INJECTION, SOLUTION INTRAVENOUS at 08:31

## 2025-01-30 RX ADMIN — SODIUM CHLORIDE 500 ML: 900 INJECTION, SOLUTION INTRAVENOUS at 11:19

## 2025-01-30 RX ADMIN — THIAMINE HCL TAB 100 MG 100 MG: 100 TAB at 13:14

## 2025-01-30 RX ADMIN — IOHEXOL 75 ML: 350 INJECTION, SOLUTION INTRAVENOUS at 09:23

## 2025-01-30 RX ADMIN — METOPROLOL TARTRATE 5 MG: 5 INJECTION INTRAVENOUS at 10:10

## 2025-01-30 SDOH — ECONOMIC STABILITY: TRANSPORTATION INSECURITY: IN THE PAST 12 MONTHS, HAS LACK OF TRANSPORTATION KEPT YOU FROM MEDICAL APPOINTMENTS OR FROM GETTING MEDICATIONS?: NO

## 2025-01-30 SDOH — ECONOMIC STABILITY: HOUSING INSECURITY: IN THE PAST 12 MONTHS, HOW MANY TIMES HAVE YOU MOVED WHERE YOU WERE LIVING?: 0

## 2025-01-30 SDOH — ECONOMIC STABILITY: FOOD INSECURITY: WITHIN THE PAST 12 MONTHS, THE FOOD YOU BOUGHT JUST DIDN'T LAST AND YOU DIDN'T HAVE MONEY TO GET MORE.: NEVER TRUE

## 2025-01-30 SDOH — ECONOMIC STABILITY: HOUSING INSECURITY: AT ANY TIME IN THE PAST 12 MONTHS, WERE YOU HOMELESS OR LIVING IN A SHELTER (INCLUDING NOW)?: NO

## 2025-01-30 SDOH — HEALTH STABILITY: MENTAL HEALTH: HOW OFTEN DO YOU HAVE SIX OR MORE DRINKS ON ONE OCCASION?: WEEKLY

## 2025-01-30 SDOH — ECONOMIC STABILITY: FOOD INSECURITY: HOW HARD IS IT FOR YOU TO PAY FOR THE VERY BASICS LIKE FOOD, HOUSING, MEDICAL CARE, AND HEATING?: SOMEWHAT HARD

## 2025-01-30 SDOH — SOCIAL STABILITY: SOCIAL NETWORK: IN A TYPICAL WEEK, HOW MANY TIMES DO YOU TALK ON THE PHONE WITH FAMILY, FRIENDS, OR NEIGHBORS?: TWICE A WEEK

## 2025-01-30 SDOH — ECONOMIC STABILITY: FOOD INSECURITY: WITHIN THE PAST 12 MONTHS, YOU WORRIED THAT YOUR FOOD WOULD RUN OUT BEFORE YOU GOT THE MONEY TO BUY MORE.: NEVER TRUE

## 2025-01-30 SDOH — SOCIAL STABILITY: SOCIAL NETWORK: HOW OFTEN DO YOU ATTEND CHURCH OR RELIGIOUS SERVICES?: NEVER

## 2025-01-30 SDOH — SOCIAL STABILITY: SOCIAL INSECURITY: WITHIN THE LAST YEAR, HAVE YOU BEEN HUMILIATED OR EMOTIONALLY ABUSED IN OTHER WAYS BY YOUR PARTNER OR EX-PARTNER?: NO

## 2025-01-30 SDOH — SOCIAL STABILITY: SOCIAL NETWORK
DO YOU BELONG TO ANY CLUBS OR ORGANIZATIONS SUCH AS CHURCH GROUPS, UNIONS, FRATERNAL OR ATHLETIC GROUPS, OR SCHOOL GROUPS?: NO

## 2025-01-30 SDOH — SOCIAL STABILITY: SOCIAL INSECURITY: ARE YOU MARRIED, WIDOWED, DIVORCED, SEPARATED, NEVER MARRIED, OR LIVING WITH A PARTNER?: WIDOWED

## 2025-01-30 SDOH — SOCIAL STABILITY: SOCIAL INSECURITY
WITHIN THE LAST YEAR, HAVE YOU BEEN RAPED OR FORCED TO HAVE ANY KIND OF SEXUAL ACTIVITY BY YOUR PARTNER OR EX-PARTNER?: NO

## 2025-01-30 SDOH — ECONOMIC STABILITY: INCOME INSECURITY: IN THE PAST 12 MONTHS HAS THE ELECTRIC, GAS, OIL, OR WATER COMPANY THREATENED TO SHUT OFF SERVICES IN YOUR HOME?: NO

## 2025-01-30 SDOH — ECONOMIC STABILITY: HOUSING INSECURITY: IN THE LAST 12 MONTHS, WAS THERE A TIME WHEN YOU WERE NOT ABLE TO PAY THE MORTGAGE OR RENT ON TIME?: NO

## 2025-01-30 SDOH — HEALTH STABILITY: MENTAL HEALTH: HOW MANY DRINKS CONTAINING ALCOHOL DO YOU HAVE ON A TYPICAL DAY WHEN YOU ARE DRINKING?: 1 OR 2

## 2025-01-30 SDOH — HEALTH STABILITY: MENTAL HEALTH
DO YOU FEEL STRESS - TENSE, RESTLESS, NERVOUS, OR ANXIOUS, OR UNABLE TO SLEEP AT NIGHT BECAUSE YOUR MIND IS TROUBLED ALL THE TIME - THESE DAYS?: NOT AT ALL

## 2025-01-30 SDOH — HEALTH STABILITY: MENTAL HEALTH: HOW OFTEN DO YOU HAVE A DRINK CONTAINING ALCOHOL?: 4 OR MORE TIMES A WEEK

## 2025-01-30 SDOH — SOCIAL STABILITY: SOCIAL INSECURITY: WITHIN THE LAST YEAR, HAVE YOU BEEN AFRAID OF YOUR PARTNER OR EX-PARTNER?: NO

## 2025-01-30 SDOH — SOCIAL STABILITY: SOCIAL NETWORK: HOW OFTEN DO YOU ATTEND MEETINGS OF THE CLUBS OR ORGANIZATIONS YOU BELONG TO?: NEVER

## 2025-01-30 SDOH — HEALTH STABILITY: PHYSICAL HEALTH: ON AVERAGE, HOW MANY MINUTES DO YOU ENGAGE IN EXERCISE AT THIS LEVEL?: 0 MIN

## 2025-01-30 SDOH — SOCIAL STABILITY: SOCIAL NETWORK: HOW OFTEN DO YOU GET TOGETHER WITH FRIENDS OR RELATIVES?: NEVER

## 2025-01-30 SDOH — HEALTH STABILITY: PHYSICAL HEALTH: ON AVERAGE, HOW MANY DAYS PER WEEK DO YOU ENGAGE IN MODERATE TO STRENUOUS EXERCISE (LIKE A BRISK WALK)?: 0 DAYS

## 2025-01-30 ASSESSMENT — PAIN DESCRIPTION - ORIENTATION: ORIENTATION: MID

## 2025-01-30 ASSESSMENT — PAIN SCALES - GENERAL: PAINLEVEL_OUTOF10: 3

## 2025-01-30 ASSESSMENT — PAIN - FUNCTIONAL ASSESSMENT: PAIN_FUNCTIONAL_ASSESSMENT: 0-10

## 2025-01-30 ASSESSMENT — PAIN DESCRIPTION - LOCATION: LOCATION: CHEST

## 2025-01-30 ASSESSMENT — ACTIVITIES OF DAILY LIVING (ADL)
LACK_OF_TRANSPORTATION: NO
LACK_OF_TRANSPORTATION: NO

## 2025-01-30 ASSESSMENT — LIFESTYLE VARIABLES
AUDIT-C TOTAL SCORE: 7
SKIP TO QUESTIONS 9-10: 0

## 2025-01-30 NOTE — PROGRESS NOTES
01/30/25 1524   [facility] Disability Status   Are you deaf or do you have serious difficulty hearing? N   Are you blind or do you have serious difficulty seeing, even when wearing glasses? N   Because of a physical, mental, or emotional condition, do you have serious difficulty concentrating, remembering, or making decisions? (5 years old or older) N   Do you have serious difficulty walking or climbing stairs? N   Do you have serious difficulty dressing or bathing? N   Because of a physical, mental, or emotional condition, do you have serious difficulty doing errands alone such as visiting the doctor? N

## 2025-01-30 NOTE — PROGRESS NOTES
01/30/25 1519   Physical Activity   On average, how many days per week do you engage in moderate to strenuous exercise (like a brisk walk)? 0 days   On average, how many minutes do you engage in exercise at this level? 0 min   Financial Resource Strain   How hard is it for you to pay for the very basics like food, housing, medical care, and heating? Somewhat   Housing Stability   In the last 12 months, was there a time when you were not able to pay the mortgage or rent on time? N   In the past 12 months, how many times have you moved where you were living? 0   At any time in the past 12 months, were you homeless or living in a shelter (including now)? N   Transportation Needs   In the past 12 months, has lack of transportation kept you from medical appointments or from getting medications? no   In the past 12 months, has lack of transportation kept you from meetings, work, or from getting things needed for daily living? No   Food Insecurity   Within the past 12 months, you worried that your food would run out before you got the money to buy more. Never true   Within the past 12 months, the food you bought just didn't last and you didn't have money to get more. Never true   Stress   Do you feel stress - tense, restless, nervous, or anxious, or unable to sleep at night because your mind is troubled all the time - these days? Not at all   Social Connections   In a typical week, how many times do you talk on the phone with family, friends, or neighbors? Twice a week   How often do you get together with friends or relatives? Never   How often do you attend Denominational or [employer] services? Never   Do you belong to any clubs or organizations such as Denominational groups, unions, fraternal or athletic groups, or school groups? No   How often do you attend meetings of the clubs or organizations you belong to? Never   Are you , , , , never , or living with a partner?     Intimate Partner Violence   Within the last year, have you been afraid of your partner or ex-partner? No   Within the last year, have you been humiliated or emotionally abused in other ways by your partner or ex-partner? No   Within the last year, have you been kicked, hit, slapped, or otherwise physically hurt by your partner or ex-partner? No   Within the last year, have you been raped or forced to have any kind of sexual activity by your partner or ex-partner? No   Alcohol Use   Q1: How often do you have a drink containing alcohol? 4 or more ti  (pt said that he drinks to tall beers with a fireball)   Q2: How many drinks containing alcohol do you have on a typical day when you are drinking? 1 or 2   Q3: How often do you have six or more drinks on one occasion? Weekly   Utilities   In the past 12 months has the electric, gas, oil, or water company threatened to shut off services in your home? No   Health Literacy   How often do you need to have someone help you when you read instructions, pamphlets, or other written material from your doctor or pharmacy? Never

## 2025-01-30 NOTE — H&P (VIEW-ONLY)
Inpatient consult to Cardiology  Consult performed by: MAEGAN Hess-CNP  Consult ordered by: Quinn Odonnell MD  Reason for consult: SVT        History Of Present Illness:    Los Rubio is a 54 y.o. male presenting with recurrent SVT at rates in the 150s after utilizing his inhaler at work.  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  Also at that time underwent nuclear stress testing which was negative for ischemia echocardiogram revealed a normal left ventricular function as compared to a prior ejection fraction of 35% noted on echocardiogram in 2024, nl EF in 2020.  Patient also has a history of heavy tobacco use, ethanol use approximately 2 drinks per evening and hypertension.  Patient has been doing well taking his usual medications and today felt as though was having an asthma attack utilizes short term inhaler and developed rapid rates.  In the emergency room he was treated with IV metoprolol 5 mg x 2 with  slowing in rate into the 120s, Casey studies revealed a chronic WBC of 15.7 hemoglobin 14.5 hematocrit 42.3 platelets were 488 BUN and creatinine 19 and 0.99 glucose 165 potassium 4.3  normal LFT, TSH 1.75, #1 TI 7, TI #2  8, AG consistent with SVT, AVNRT at a rate of 156 bpm.   IV metoprolol 5 mg and repeat dose of IV metoprolol 5 mg with improvement of tachycardia. CT angio chest for PE was pursued without evidence of pneumonia, pneumothorax, pulmonary embolism or findings consistent with CHF. Initial lactate was 2.1 with repeat lactate 1.2.   Examination was unremarkable, patient asked to do vagal maneuvers and after the third maneuver rate slowed with termination and return to sinus rhythm rates in the 70s.  To be admitted and observed overnight    Last Recorded Vitals:  Vitals:    01/30/25 1145 01/30/25 1400 01/30/25 1430 01/30/25 1645   BP: (!)  144/110 (!) 152/107 (!) 123/95 (!) 151/105   BP Location:       Patient Position: Sitting Lying Lying Lying   Pulse: (!) 127 72 69 68   Resp: 18 20 (!) 21 20   Temp:       SpO2: 98% 97% 98% 99%   Weight:       Height:           Last Labs:  CBC - 1/30/2025:  8:28 AM  15.7 14.5 488    42.3      CMP - 1/30/2025:  8:28 AM  8.9 6.7 21 --- 0.5   4.7 4.3 21 64      PTT - 5/22/2024:  4:30 AM  1.0   10.8 26.5     Troponin I, High Sensitivity   Date/Time Value Ref Range Status   01/30/2025 09:33 AM 8 0 - 20 ng/L Final   01/30/2025 08:28 AM 7 0 - 20 ng/L Final   01/17/2025 07:22 AM 55 (HH) 0 - 20 ng/L Final     BNP   Date/Time Value Ref Range Status   01/30/2025 08:28  (H) 0 - 99 pg/mL Final     Hemoglobin A1C   Date/Time Value Ref Range Status   05/21/2024 05:26 AM 5.7 (H) See below % Final     LDL Calculated   Date/Time Value Ref Range Status   06/25/2020 07:53 PM 84 65 - 130 MG/DL Final      Last I/O:  No intake/output data recorded.    Past Cardiology Tests (Last 3 Years):  EKG:  ECG 12 lead 01/30/2025 (Preliminary)      Electrocardiogram, 12-lead PRN ACS symptoms 01/16/2025      Electrocardiogram, 12-lead PRN ACS symptoms 05/21/2024      Electrocardiogram, 12-lead PRN ACS symptoms     Echo:  Transthoracic Echo (TTE) Complete 01/17/2025  Transthoracic Echo (TTE) Complete    Result Date: 1/17/2025           [place] [address]            Phone [phone] TRANSTHORACIC ECHOCARDIOGRAM REPORT Patient Name:       CHARLES Donis Physician:    [id number] Shawn Barrett DO Study Date:         1/17/2025            Ordering Provider:    [id number] ASHOK GONZALEZ MRN/PID:            [id number]             Fellow: Accession#:         [id number]         Nurse: Date of Birth/Age:  1970 / 54 years Sonographer:          Nallely Calix                                                                 RDCS Gender Assigned at  M                    Additional Staff: Birth: Height:             152.40 cm            Admit Date: Weight:             72.57 kg             Admission Status:     Inpatient -                                                                Routine BSA / BMI:          1.70 m2 / 31.25      Department Location:  [facility]                     kg/m2 Blood Pressure: 110 /87 mmHg Study Type:    TRANSTHORACIC ECHO (TTE) COMPLETE Diagnosis/ICD: Supraventricular tachycardia-I47.1 Indication:    SVT, CHF CPT Codes:     Echo Complete w Full [id number] Patient History: Pertinent History: SVT, CHF. Study Detail: The following Echo studies were performed: 2D, M-Mode, Doppler and               color flow.  PHYSICIAN INTERPRETATION: Left Ventricle: Left ventricular ejection fraction is normal, by visual estimate at 60-65%. There are no regional wall motion abnormalities. The left ventricular cavity size is normal. There is normal septal and mildly increased posterior left ventricular wall thickness. There is left ventricular concentric remodeling. Spectral Doppler shows a normal pattern of left ventricular diastolic filling. Left Atrium: The left atrium is normal in size. Right Ventricle: The right ventricle is normal in size. There is normal right ventricular global systolic function. Right Atrium: The right atrium is normal in size. Aortic Valve: The aortic valve appears structurally normal. The aortic valve dimensionless index is 0.88. There is no evidence of aortic valve regurgitation. The peak instantaneous gradient of the aortic valve is 7 mmHg. The mean gradient of the aortic valve is 4 mmHg. Mitral Valve: The mitral valve is normal in structure. There is no evidence of mitral valve regurgitation. Tricuspid Valve: The tricuspid valve is structurally normal. There is mild tricuspid regurgitation. Pulmonic Valve: The pulmonic valve is structurally  normal. There is mild pulmonic valve regurgitation. Pericardium: No pericardial effusion noted. Aorta: The aortic root is normal.  CONCLUSIONS:  1. Left ventricular ejection fraction is normal, by visual estimate at 60-65%.  2. There is normal right ventricular global systolic function. QUANTITATIVE DATA SUMMARY:  2D MEASUREMENTS:            Normal Ranges: LAs:             3.53 cm    (2.7-4.0cm) IVSd:            0.95 cm    (0.6-1.1cm) LVPWd:           1.12 cm    (0.6-1.1cm) LVIDd:           5.03 cm    (3.9-5.9cm) LVIDs:           3.65 cm LV Mass Index:   113.4 g/m2 LV % FS          27.4 %  LA VOLUME:                    Normal Ranges: LA Vol A4C:        33.2 ml    (22+/-6mL/m2) LA Vol A2C:        85.8 ml LA Vol BP:         59.2 ml LA Vol Index A4C:  19.6 ml/m2 LA Vol Index A2C:  50.5 ml/m2 LA Vol Index BP:   34.9 ml/m2 LA Area A4C:       13.7 cm2 LA Area A2C:       24.4 cm2 LA Major Axis A4C: 4.8 cm LA Major Axis A2C: 5.9 cm LA Volume Index:   35.1 ml/m2 LA Vol A4C:        33.0 ml LA Vol A2C:        87.0 ml LA Vol Index BSA:  35.3 ml/m2  RA VOLUME BY A/L METHOD:            Normal Ranges: RA Vol A4C:              78.3 ml    (8.3-19.5ml) RA Vol Index A4C:        46.1 ml/m2 RA Area A4C:             22.3 cm2 RA Major Axis A4C:       5.4 cm  M-MODE MEASUREMENTS:         Normal Ranges: LAs:                 0.00 cm (2.7-4.0cm)  AORTA MEASUREMENTS:         Normal Ranges: Ao Sinus, d:        3.50 cm (2.1-3.5cm) Ao STJ, d:          3.10 cm (1.7-3.4cm) Asc Ao, d:          2.70 cm (2.1-3.4cm)  LV SYSTOLIC FUNCTION BY 2D PLANIMETRY (MOD):                      Normal Ranges: EF-A4C View:    53 % (>=55%) EF-A2C View:    50 % EF-Biplane:     51 % EF-Visual:      63 % EF-3DQ:         54 % EF-Auto:        53 % LV EF Reported: 63 %  LV DIASTOLIC FUNCTION:           Normal Ranges: MV Peak E:             0.61 m/s  (0.7-1.2 m/s) MV Peak A:             0.68 m/s  (0.42-0.7 m/s) E/A Ratio:             0.90      (1.0-2.2) MV e'                   0.080 m/s (>8.0) MV lateral e'          0.09 m/s MV medial e'           0.07 m/s E/e' Ratio:            7.61      (<8.0)  MITRAL VALVE:           Normal Ranges: MV DT:        5649 msec (150-240msec)  AORTIC VALVE:                     Normal Ranges: AoV Vmax:                1.28 m/s (<=1.7m/s) AoV Peak P.5 mmHg (<20mmHg) AoV Mean PG:             3.9 mmHg (1.7-11.5mmHg) LVOT Max Issa:            1.16 m/s (<=1.1m/s) AoV VTI:                 27.74 cm (18-25cm) LVOT VTI:                24.43 cm LVOT Diameter:           2.12 cm  (1.8-2.4cm) AoV Area, VTI:           3.11 cm2 (2.5-5.5cm2) AoV Area,Vmax:           3.19 cm2 (2.5-4.5cm2) AoV Dimensionless Index: 0.88  RIGHT VENTRICLE: RV Basal 4.16 cm RV Mid   3.10 cm RV Major 7.9 cm TAPSE:   27.0 mm RV s'    0.14 m/s  TRICUSPID VALVE/RVSP:          Normal Ranges: Peak TR Velocity:     2.89 m/s RV Syst Pressure:     36 mmHg  (< 30mmHg) IVC Diam:             2.35 cm  PULMONIC VALVE:          Normal Ranges: PV Accel Time:  94 msec  (>120ms) PV Max Issa:     0.7 m/s  (0.6-0.9m/s) PV Max P.0 mmHg  AORTA: Asc Ao Diam 2.70 cm  [id number] Shawn Barrett DO Electronically signed on 2025 at 1:24:52 PM  ** Final **     Transthoracic Echo (TTE) Complete    Result Date: 2024           [place] [id number]            Phone [phone] TRANSTHORACIC ECHOCARDIOGRAM REPORT  Patient Name:      CHARLES JONNA Donis Physician:    [id number] Manish Mtz MD Study Date:        2024             Ordering Provider:    [id number] CHAPARRO DÍAZ MRN/PID:           [id number]              Fellow: Accession#:        [id number]          Nurse: Date of Birth/Age: 1970 / 53 years  Sonographer:          Lincoln Rayo RDCS Gender:             M                     Additional Staff: Height:            180.00 cm             Admit Date: Weight:            75.00 kg              Admission Status:     Inpatient -                                                                Routine BSA / BMI:         1.94 m2 / 23.15 kg/m2 Department Location:  [facility] Blood Pressure: 149 /102 mmHg Study Type:    TRANSTHORACIC ECHO (TTE) COMPLETE Diagnosis/ICD: Abnormal electrocardiogram [ECG] [EKG]-R94.31 Indication:    Abn Ekg / SVT CPT Codes:     Echo Complete w Full Doppler-[id number] Patient History: Pertinent History: A-Fib, CHF, CAD, HTN, Hyperlipidemia and Murmur. Study Detail: The following Echo studies were performed: 2D, M-Mode, Doppler and               color flow.  PHYSICIAN INTERPRETATION: Left Ventricle: Left ventricular systolic function is moderately decreased, with an estimated ejection fraction of 35%. There is global hypokinesis of the left ventricle with minor regional variations. The left ventricular cavity size is normal. Spectral Doppler shows a normal pattern of left ventricular diastolic filling. Left Atrium: The left atrium is normal in size. Right Ventricle: The right ventricle is normal in size. There is normal right ventricular global systolic function. Right Atrium: The right atrium is normal in size. Aortic Valve: The aortic valve is trileaflet. There is no evidence of aortic valve regurgitation. The peak instantaneous gradient of the aortic valve is 3.1 mmHg. The mean gradient of the aortic valve is 1.7 mmHg. Mitral Valve: The mitral valve is normal in structure. There is trace mitral valve regurgitation. Tricuspid Valve: The tricuspid valve is structurally normal. There is trace tricuspid regurgitation. Pulmonic Valve: The pulmonic valve is not well visualized. The pulmonic valve regurgitation was not well visualized. Pericardium: There is no pericardial effusion noted. Aorta: The aortic root is normal.  CONCLUSIONS:  1. Left ventricular  systolic function is moderately decreased with a 35% estimated ejection fraction.  2. Trace mitral valve regurgitation.  3. Trace tricuspid regurgitation is visualized.  4. There is global hypokinesis of the left ventricle with minor regional variations. QUANTITATIVE DATA SUMMARY: 2D MEASUREMENTS:                          Normal Ranges: LAs:           3.49 cm   (2.7-4.0cm) IVSd:          0.99 cm   (0.6-1.1cm) LVPWd:         1.10 cm   (0.6-1.1cm) LVIDd:         4.93 cm   (3.9-5.9cm) LVIDs:         4.22 cm LV Mass Index: 96.9 g/m2 LV % FS        14.6 % LA VOLUME:                              Normal Ranges: LA Vol A4C:       79.5 ml    (22+/-6mL/m2) LA Vol A2C:       61.2 ml LA Vol BP:        72.7 ml LA Vol Index A4C: 40.9 ml/m2 LA Vol Index A2C: 31.5 ml/m2 LA Vol Index BP:  37.4 ml/m2 LA Volume Index:  38.0 ml/m2 LA Vol A4C:       74.3 ml LA Vol A2C:       56.7 ml RA VOLUME BY A/L METHOD:                       Normal Ranges: RA Area A4C: 20.0 cm2 LV SYSTOLIC FUNCTION BY 2D PLANIMETRY (MOD):                     Normal Ranges: EF-A4C View: 28.7 % (>=55%) EF-A2C View: 36.6 % EF-Biplane:  33.5 % LV DIASTOLIC FUNCTION:                        Normal Ranges: MV Peak E:    0.46 m/s (0.7-1.2 m/s) MV Peak A:    0.43 m/s (0.42-0.7 m/s) E/A Ratio:    1.09     (1.0-2.2) MV e'         0.06 m/s (>8.0) MV lateral e' 0.07 m/s MV medial e'  0.06 m/s E/e' Ratio:   7.15     (<8.0) MITRAL VALVE:                 Normal Ranges: MV DT: 156 msec (150-240msec) AORTIC VALVE:                                   Normal Ranges: AoV Vmax:                0.88 m/s (<=1.7m/s) AoV Peak PG:             3.1 mmHg (<20mmHg) AoV Mean P.7 mmHg (1.7-11.5mmHg) LVOT Max Issa:            0.84 m/s (<=1.1m/s) AoV VTI:                 15.84 cm (18-25cm) LVOT VTI:                15.43 cm LVOT Diameter:           2.14 cm  (1.8-2.4cm) AoV Area, VTI:           3.51 cm2 (2.5-5.5cm2) AoV Area,Vmax:           3.47 cm2 (2.5-4.5cm2) AoV Dimensionless Index:  0.97  RIGHT VENTRICLE: RV Basal 4.24 cm RV Mid   2.90 cm RV Major 7.3 cm RV s'    0.08 m/s TRICUSPID VALVE/RVSP:                             Normal Ranges: Peak TR Velocity: 2.78 m/s RV Syst Pressure: 38.9 mmHg (< 30mmHg) IVC Diam:         2.14 cm PULMONIC VALVE:                      Normal Ranges: PV Max Issa: 0.6 m/s  (0.6-0.9m/s) PV Max P.3 mmHg PV Mean P.7 mmHg PV VTI:     10.49 cm  [id number] Manish Mtz MD Electronically signed on 2024 at 6:03:32 PM  ** Final **          Transthoracic Echo (TTE) Complete 2024    Ejection Fractions:  EF   Date/Time Value Ref Range Status   2025 08:43 AM 63 %      Cath:  No results found for this or any previous visit from the past 1095 days.    Stress Test:  Nuclear Stress Test 2025     FINDINGS:  PERFUSION WITH STRESS:  Normal perfusion to all walls of the left  ventricular myocardium. PERFUSION AT REST:  Unchanged from perfusion  with stress. Gated study:  Normal motion and wall thickening.  Estimated left ventricular ejection fraction:  48%.      Left ventricular chamber size:  117 mL.      IMPRESSION:  1. There is no evidence for stress induced ischemia or prior  myocardial infarction.  2. The left ventricle is of normal size and demonstrates borderline  decreased wall motion with an estimated ejection fraction of  48%.      Cardiac Imaging:  No results found for this or any previous visit from the past 1095 days.      Past Medical History:  He has a past medical history of Hypertension.    Past Surgical History:  He has no past surgical history on file.      Social History:  He reports that he has been smoking cigarettes. He started smoking about 35 years ago. He has a 70.2 pack-year smoking history. He has quit using smokeless tobacco. He reports current drug use. Drug: Marijuana. No history on file for alcohol use.    Family History:  No family history on file.     Allergies:  Shellfish derived    Inpatient Medications:  Scheduled  medications   Medication Dose Route Frequency    carvedilol  6.25 mg oral BID    [START ON 1/31/2025] fluticasone furoate-vilanteroL  1 puff inhalation Daily    ipratropium-albuteroL  3 mL nebulization Once    losartan  25 mg oral Daily    thiamine  100 mg oral Daily     PRN medications   Medication    ipratropium-albuteroL    metoprolol     Continuous Medications   Medication Dose Last Rate     Outpatient Medications:  Current Outpatient Medications   Medication Instructions    amiodarone (PACERONE) 200 mg, oral, Daily    carvedilol (COREG) 6.25 mg, oral, 2 times daily (morning and late afternoon)    spironolactone (ALDACTONE) 12.5 mg, oral, Daily    Vitamin B-1 100 mg, oral, Daily     CT angio chest:  1.  No evidence of acute pulmonary embolus.  No acute infiltrates or  effusions.  No evidence of pneumothorax.  2.  Mild focal soft tissue attenuation within the right superior hilum  may reflect a mildly enlarged right hilar lymph node.  Small hilar  soft tissue mass cannot be excluded.  Short-term follow-up CT imaging  within 3 months recommended to assess for stability.   3.  Small pulmonary nodules measuring up to 4 mm involving the right  upper lobe, left upper lobe, and left lower lobe.    Physical Exam:  On examination the patient is alert and oriented x 3 no acute distress he is slightly at the end appearing male.  He is some barrel chest change he is upper chest.  Neck veins are not elevated upstrokes and volumes are normal there are no carotid bruits.  His midline lungs have some diminution of breath sounds in the bases  Normal sounding S1 single S2 without cardiac murmurs the PMI is nondisplaced.  Abdomen is unremarkable extremities have no peripheral edema pulses are palpable bilaterally     Assessment/Plan   54-year-old male with previous episodes of SVT/AVNRT treated with beta-blocker and amiodarone presents with recurrent episode ultimately terminated with vagal maneuvers.  Prior cardiomyopathy recent  nuclear stress test negative for ischemia suspect either tachycardia mediated with some possible ethanol component.  Last echocardiogram ejection fraction normalized with nuclear stress testing EF of 48%  Currently in sinus rhythm, most benefit and curative approach would be for RFA particularly with prior CMO.  Will discontinue amiodarone with continuation of beta-blocker  Patient is hypertensive with a nuclear ejection fraction still less than completely normal will add losartan  Keep K+>4 and Mg+ >2  Patient in agreement after discussion regarding RFA  Scheduled as an outpatient on February 5 th  In terms of beta-blocker should be instructed to take one half dose twice daily starting February 1, Saturday and Sunday and then discontinue with no beta-blocker Monday Tuesday procedure scheduled for Wednesday   Would again discuss smoking cessation, could offer nicotine patch and decrease ethanol  CT angio chest; right upper lobe nodule would consider pulmonary consult with tobacco history or outpatient follow-up     Discussed with Dr. Williamson and hospitalist service  For the consult on this pleasant gentleman       Code Status:  Full Code    I spent 60 minutes in the professional and overall care of this patient.        Uma Sandoval, APRN-CNP

## 2025-01-30 NOTE — H&P
"History Of Present Illness  Los Ruibo is a 54 y.o. male presenting with heart palpitations..    He was just hospitalized about 2 weeks ago with SVT.  Converted to sinus rhythm after being put on an amiodarone drip.  Sent home on amiodarone 200 mg twice a day.  Was supposed to follow-up with EP.  He took his amiodarone as prescribed but after using an asthma inhaler around 4 AM he noticed heart palpitations.  Arrived with a regular narrow complex tachycardia at about 150 bpm.  Given 2 doses of IV amiodarone.  He now has a regular narrow complex rhythm at about 125 bpm.  In either rhythm do I see P waves or flutter waves.  I do not see any abrupt change in rhythm when he went from 150 to 125.  He feels better now.     Past Medical History  He has a past medical history of Hypertension.  DVT as mentioned above    Surgical History  He has no past surgical history on file.     Social History active smoker.  He has at least 2 beers a day and tends to add some liquor as well to make it a \"highball\"    Family History  Negative for premature heart disease     Allergies  Shellfish derived    Review of Systems-see HPI for pertinent positives and negatives.     Physical Exam alert oriented undistressed  Heart Giller and tacky.  No loss couple murmur  Lungs clear to auscultation  Abdomen soft nontender nondistended  Extremities no significant edema  Neuro cranial nerves intact good tone strength arms and legs.     Last Recorded Vitals  BP (!) 144/110 (Patient Position: Sitting)   Pulse (!) 127   Temp 37.1 °C (98.8 °F)   Resp 18   Wt 72.6 kg (160 lb)   SpO2 98%     Relevant Results             Assessment/Plan   Assessment & Plan  Dyspnea, unspecified type    SVT (supraventricular tachycardia) (CMS-[facility])  He received a total of 10 mg IV metoprolol and his heart rate went from 150 to 125.  Rhythm looks the same.  Narrow complex with no visible P waves or flutter waves.  I suspect is all SVT.  He was sent home on " amiodarone 200 mg daily.  That is not much of a loading dose.  I am going to put him on 400 mg twice a day starting now and continue using the as needed metoprolol.  I am consulting electrophysiology and have messaged them about the current situation.      Quinn Odonnell MD

## 2025-01-30 NOTE — PROGRESS NOTES
01/30/25 1522   Discharge Planning   Living Arrangements Family members   Support Systems Family members   Type of Residence Private residence   Number of Stairs to Enter Residence 12  (pt said that he lives in a  2 story apt bildg with 3 flights of stairs to climb to get to the apt)   Number of Stairs Within Residence 0   Do you have animals or pets at home? No   Who is requesting discharge planning? Provider   Home or Post Acute Services None   Expected Discharge Disposition Home   Does the patient need discharge transport arranged? No   Financial Resource Strain   How hard is it for you to pay for the very basics like food, housing, medical care, and heating? Somewhat   Housing Stability   In the last 12 months, was there a time when you were not able to pay the mortgage or rent on time? N   In the past 12 months, how many times have you moved where you were living? 0   At any time in the past 12 months, were you homeless or living in a shelter (including now)? N   Transportation Needs   In the past 12 months, has lack of transportation kept you from medical appointments or from getting medications? no   In the past 12 months, has lack of transportation kept you from meetings, work, or from getting things needed for daily living? No   Patient Choice   Patient / Family choosing to utilize agency / facility established prior to hospitalization No   Stroke Family Assessment   Stroke Family Assessment Needed No

## 2025-01-30 NOTE — PROGRESS NOTES
Attestation/Supervisory note for RIKI Cruz      The patient is a 54-year-old male presenting to the emergency department because he feels like his heart rate is fast.  He states he has been seen in the emergency room several times for similar symptoms.  He states he does have a history of asthma.  He states that sometimes he is short of breath.  He does smoke.  He does have a history of hypertension but no history of hyperlipidemia or diabetes.  No history of CAD or ACS.  He denies any history of PE or DVT.  He denies any history of diabetes or hyperlipidemia.  He denies any recent trauma or sick contacts.  No recent travel.  No hot or cold intolerance.  No hair, skin or weight changes.  No headache or visual changes.  No difficulty swallowing or speaking.  No chest pain.  No diaphoresis.  No abdominal pain.  No nausea vomiting.  No diarrhea or constipation but no urinary complaints.  No focal weakness or numbness.  All pertinent positives and negatives are recorded above.  All other systems reviewed and otherwise negative.  Vital signs with tachycardia and diastolic hypertension but otherwise within normal limits.  Physical exam with a well-nourished well-developed male in no acute distress.  HEENT exam within normal limits.  He has no evidence of airway compromise or respiratory distress.  Abdominal exam is benign.  He does not have any gross motor, neurologic or vascular episodes on exam.  He is able to converse without difficulty.  He is able to walk and stand without difficulty.  Pulses are equal bilaterally.      EKG with SVT at 156 bpm, normal axis, normal voltage, normal ST segment, normal T waves      IV fluids and DuoNeb ordered      Diagnostic labs with with mild hyperglycemia, leukocytosis, thrombocytosis but otherwise unremarkable.      Initial troponin 7.  Repeat troponin 8      CT angio chest for pulmonary embolism   Final Result   Addendum (preliminary) 1 of 1   Critical value: These findings were  discussed with Keshia Cruz PA-C   on 1/30/2025 at 10:12 AM.   Signed by Fermín Chew MD      Final   1.  No evidence of acute pulmonary embolus.  No acute infiltrates or   effusions.  No evidence of pneumothorax.   2.  Mild focal soft tissue attenuation within the right superior hilum   may reflect a mildly enlarged right hilar lymph node.  Small hilar   soft tissue mass cannot be excluded.  Short-term follow-up CT imaging   within 3 months recommended to assess for stability.    3.  Small pulmonary nodules measuring up to 4 mm involving the right   upper lobe, left upper lobe, and left lower lobe.  Follow-up CT   imaging recommended according to Fleischner Society recommendations.    See below.   Fleischner Society 2017 Guidelines for Management of Incidentally   Detected Pulmonary Nodules in Adults:   A.  SOLID NODULES*   Nodule Type and Size:   Single:   *Low Risk**    < 6 mm - No routine follow-up   6-8 mm - CT at 6-12 months, then consider CT at 18-24 months   > 8 mm - Consider CT at 3 months, PET/CT, or tissue sampling   *High Risk**   < 6 mm - Optional CT at 12 months   6-8 mm - CT at 6-12 months, then CT at 18-24 months   > 8 mm - Consider CT at 3 months, PET/CT, or tissue sampling   Multiple:   *Low Risk**    < 6 mm - No routine follow-up   6-8 mm - CT at 3-6 months, then consider CT at 18-24 months   > 8 mm - CT at 3-6 months, then consider CT at 18-24 months   *High Risk**   < 6 mm - Optional CT at 12 months   6-8 mm - CT at 3-6 months, then at 18-24 months   > 8 mm - CT at 3-6 months, then at 18-24 months   B. SUBSOLID NODULES*   Nodule Type and Size:   Single:     *Ground glass   < 6 mm - No routine follow-up   > 6 mm - CT at 6-12 months to confirm persistence, then CT every 2   years until 5 years   *Part Solid   < 6 mm - No routine follow-up   > 6 mm - CT at 3-6 months to confirm persistence.  If unchanged and   solid component remains < 6 mm, annual CT should be performed for 5   years.    Multiple:   < 6 mm - CT at 3-6 months.  If stable, consider CT at 2 and 4 years.   > 6 mm - CT at 3-6 months.  Subsequent management based on the most   suspicious nodule(s).   Note - These recommendations do not apply to lung cancer screening,   patients with immunosuppression, or patients with known primary   cancer.   * Dimensions are average of long and short axes, rounded to the   nearest millimeter.   ** Consider all relevant risk factors.   Signed by Fermín Chew MD         The patient does not have any evidence of airway compromise or respiratory distress on exam but is persistently tachycardic.  EKG and cardiac enzymes without evidence of a STEMI.  No other events on telemetry other than SVT.  The patient did have some improvement in his heart rate with IV metoprolol given in the emergency department.  He does have evidence of diastolic hypertension but no evidence of hypertensive urgency/emergency under crisis.  CT chest shows no evidence of PE or dissection.  No evidence of pneumonia or pneumothorax.  No evidence of CHF.  There are some pulmonary nodules and hilar adenopathy noted on CT imaging.  The patient is well-perfused on exam and has no other evidence of sepsis.  He was admitted for further management by the hospitalist.      Impression/diagnosis:  1.  Palpitations  2.  Dyspnea, dyspnea on exertion, chronic  3.  Diastolic hypertension  4.  SVT  5.  Pulmonary nodules on CT imaging      I personally saw the patient and made/approve the management plan and take responsibility for the patient management.      I independently interpreted the following study (S) EKG and diagnostic labs      I personally discussed the patient's management with the patient      I reviewed the results of the diagnostic labs and diagnostic imaging.  Formal radiology read was completed by the radiologist.      Mariana Parker MD

## 2025-01-30 NOTE — ASSESSMENT & PLAN NOTE
He received a total of 10 mg IV metoprolol and his heart rate went from 150 to 125.  Rhythm looks the same.  Narrow complex with no visible P waves or flutter waves.  I suspect is all SVT.  He was sent home on amiodarone 200 mg daily.  That is not much of a loading dose.  I am going to put him on 400 mg twice a day starting now and continue using the as needed metoprolol.  I am consulting electrophysiology and have messaged them about the current situation.

## 2025-01-30 NOTE — PROGRESS NOTES
Los Rubio is a 54 y.o. male admitted for SVT (supraventricular tachycardia) (CMS-[facility]). Pharmacy reviewed the patient's [id number] medications and allergies for accuracy.    Medications ADDED:  None  Medications CHANGED:  None  Medications REMOVED:   None     The list below reflects the updated PTA list. Comments regarding how patient may be taking medications differently can be found in the Admit Orders Activity  Prior to Admission Medications   Prescriptions Last Dose Informant   amiodarone (Pacerone) 200 mg tablet 1/29/2025 Evening Self   Sig: Take 1 tablet (200 mg) by mouth once daily.   carvedilol (Coreg) 6.25 mg tablet 1/29/2025 Evening Self   Sig: Take 1 tablet (6.25 mg) by mouth 2 times daily   (morning and late afternoon).   spironolactone (Aldactone) 25 mg tablet 1/29/2025 Evening Self   Sig: Take 0.5 tablets (12.5 mg) by mouth once daily.   thiamine (Vitamin B-1) 100 mg tablet 1/29/2025 Evening Self   Sig: Take 1 tablet (100 mg) by mouth once daily.      Facility-Administered Medications: None        The list below reflects the updated allergy list. Please review each documented allergy for additional clarification and justification.  Allergies  Reviewed by Gumaro Merrill on 1/30/2025        Severity Reactions Comments    Shellfish Derived High Anaphylaxis             Pharmacy has been updated to [facility].    Sources used to complete the med history include:   Patient interview, good historian, dispense report, care everywhere, chart review history    Below are additional concerns with the patient's PTA list.  None    Gumaro Merrill, Maria Alejandra  Please reach out via [employer] Secure Chat for questions

## 2025-01-30 NOTE — CONSULTS
Inpatient consult to Cardiology  Consult performed by: MAEGAN Hess-CNP  Consult ordered by: Quinn Odonnell MD  Reason for consult: SVT        History Of Present Illness:    Los Rubio is a 54 y.o. male presenting with recurrent SVT at rates in the 150s after utilizing his inhaler at work.  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  Also at that time underwent nuclear stress testing which was negative for ischemia echocardiogram revealed a normal left ventricular function as compared to a prior ejection fraction of 35% noted on echocardiogram in 2024, nl EF in 2020.  Patient also has a history of heavy tobacco use, ethanol use approximately 2 drinks per evening and hypertension.  Patient has been doing well taking his usual medications and today felt as though was having an asthma attack utilizes short term inhaler and developed rapid rates.  In the emergency room he was treated with IV metoprolol 5 mg x 2 with  slowing in rate into the 120s, Casey studies revealed a chronic WBC of 15.7 hemoglobin 14.5 hematocrit 42.3 platelets were 488 BUN and creatinine 19 and 0.99 glucose 165 potassium 4.3  normal LFT, TSH 1.75, #1 TI 7, TI #2  8, AG consistent with SVT, AVNRT at a rate of 156 bpm.   IV metoprolol 5 mg and repeat dose of IV metoprolol 5 mg with improvement of tachycardia. CT angio chest for PE was pursued without evidence of pneumonia, pneumothorax, pulmonary embolism or findings consistent with CHF. Initial lactate was 2.1 with repeat lactate 1.2.   Examination was unremarkable, patient asked to do vagal maneuvers and after the third maneuver rate slowed with termination and return to sinus rhythm rates in the 70s.  To be admitted and observed overnight    Last Recorded Vitals:  Vitals:    01/30/25 1145 01/30/25 1400 01/30/25 1430 01/30/25 1645   BP: (!)  144/110 (!) 152/107 (!) 123/95 (!) 151/105   BP Location:       Patient Position: Sitting Lying Lying Lying   Pulse: (!) 127 72 69 68   Resp: 18 20 (!) 21 20   Temp:       SpO2: 98% 97% 98% 99%   Weight:       Height:           Last Labs:  CBC - 1/30/2025:  8:28 AM  15.7 14.5 488    42.3      CMP - 1/30/2025:  8:28 AM  8.9 6.7 21 --- 0.5   4.7 4.3 21 64      PTT - 5/22/2024:  4:30 AM  1.0   10.8 26.5     Troponin I, High Sensitivity   Date/Time Value Ref Range Status   01/30/2025 09:33 AM 8 0 - 20 ng/L Final   01/30/2025 08:28 AM 7 0 - 20 ng/L Final   01/17/2025 07:22 AM 55 (HH) 0 - 20 ng/L Final     BNP   Date/Time Value Ref Range Status   01/30/2025 08:28  (H) 0 - 99 pg/mL Final     Hemoglobin A1C   Date/Time Value Ref Range Status   05/21/2024 05:26 AM 5.7 (H) See below % Final     LDL Calculated   Date/Time Value Ref Range Status   06/25/2020 07:53 PM 84 65 - 130 MG/DL Final      Last I/O:  No intake/output data recorded.    Past Cardiology Tests (Last 3 Years):  EKG:  ECG 12 lead 01/30/2025 (Preliminary)      Electrocardiogram, 12-lead PRN ACS symptoms 01/16/2025      Electrocardiogram, 12-lead PRN ACS symptoms 05/21/2024      Electrocardiogram, 12-lead PRN ACS symptoms     Echo:  Transthoracic Echo (TTE) Complete 01/17/2025  Transthoracic Echo (TTE) Complete    Result Date: 1/17/2025           [place] [address]            Phone [phone] TRANSTHORACIC ECHOCARDIOGRAM REPORT Patient Name:       CHARLES Donis Physician:    [id number] Shawn Barrett DO Study Date:         1/17/2025            Ordering Provider:    [id number] ASHOK GONZALEZ MRN/PID:            [id number]             Fellow: Accession#:         [id number]         Nurse: Date of Birth/Age:  1970 / 54 years Sonographer:          Nallely Calix                                                                 RDCS Gender Assigned at  M                    Additional Staff: Birth: Height:             152.40 cm            Admit Date: Weight:             72.57 kg             Admission Status:     Inpatient -                                                                Routine BSA / BMI:          1.70 m2 / 31.25      Department Location:  [facility]                     kg/m2 Blood Pressure: 110 /87 mmHg Study Type:    TRANSTHORACIC ECHO (TTE) COMPLETE Diagnosis/ICD: Supraventricular tachycardia-I47.1 Indication:    SVT, CHF CPT Codes:     Echo Complete w Full [id number] Patient History: Pertinent History: SVT, CHF. Study Detail: The following Echo studies were performed: 2D, M-Mode, Doppler and               color flow.  PHYSICIAN INTERPRETATION: Left Ventricle: Left ventricular ejection fraction is normal, by visual estimate at 60-65%. There are no regional wall motion abnormalities. The left ventricular cavity size is normal. There is normal septal and mildly increased posterior left ventricular wall thickness. There is left ventricular concentric remodeling. Spectral Doppler shows a normal pattern of left ventricular diastolic filling. Left Atrium: The left atrium is normal in size. Right Ventricle: The right ventricle is normal in size. There is normal right ventricular global systolic function. Right Atrium: The right atrium is normal in size. Aortic Valve: The aortic valve appears structurally normal. The aortic valve dimensionless index is 0.88. There is no evidence of aortic valve regurgitation. The peak instantaneous gradient of the aortic valve is 7 mmHg. The mean gradient of the aortic valve is 4 mmHg. Mitral Valve: The mitral valve is normal in structure. There is no evidence of mitral valve regurgitation. Tricuspid Valve: The tricuspid valve is structurally normal. There is mild tricuspid regurgitation. Pulmonic Valve: The pulmonic valve is structurally  normal. There is mild pulmonic valve regurgitation. Pericardium: No pericardial effusion noted. Aorta: The aortic root is normal.  CONCLUSIONS:  1. Left ventricular ejection fraction is normal, by visual estimate at 60-65%.  2. There is normal right ventricular global systolic function. QUANTITATIVE DATA SUMMARY:  2D MEASUREMENTS:            Normal Ranges: LAs:             3.53 cm    (2.7-4.0cm) IVSd:            0.95 cm    (0.6-1.1cm) LVPWd:           1.12 cm    (0.6-1.1cm) LVIDd:           5.03 cm    (3.9-5.9cm) LVIDs:           3.65 cm LV Mass Index:   113.4 g/m2 LV % FS          27.4 %  LA VOLUME:                    Normal Ranges: LA Vol A4C:        33.2 ml    (22+/-6mL/m2) LA Vol A2C:        85.8 ml LA Vol BP:         59.2 ml LA Vol Index A4C:  19.6 ml/m2 LA Vol Index A2C:  50.5 ml/m2 LA Vol Index BP:   34.9 ml/m2 LA Area A4C:       13.7 cm2 LA Area A2C:       24.4 cm2 LA Major Axis A4C: 4.8 cm LA Major Axis A2C: 5.9 cm LA Volume Index:   35.1 ml/m2 LA Vol A4C:        33.0 ml LA Vol A2C:        87.0 ml LA Vol Index BSA:  35.3 ml/m2  RA VOLUME BY A/L METHOD:            Normal Ranges: RA Vol A4C:              78.3 ml    (8.3-19.5ml) RA Vol Index A4C:        46.1 ml/m2 RA Area A4C:             22.3 cm2 RA Major Axis A4C:       5.4 cm  M-MODE MEASUREMENTS:         Normal Ranges: LAs:                 0.00 cm (2.7-4.0cm)  AORTA MEASUREMENTS:         Normal Ranges: Ao Sinus, d:        3.50 cm (2.1-3.5cm) Ao STJ, d:          3.10 cm (1.7-3.4cm) Asc Ao, d:          2.70 cm (2.1-3.4cm)  LV SYSTOLIC FUNCTION BY 2D PLANIMETRY (MOD):                      Normal Ranges: EF-A4C View:    53 % (>=55%) EF-A2C View:    50 % EF-Biplane:     51 % EF-Visual:      63 % EF-3DQ:         54 % EF-Auto:        53 % LV EF Reported: 63 %  LV DIASTOLIC FUNCTION:           Normal Ranges: MV Peak E:             0.61 m/s  (0.7-1.2 m/s) MV Peak A:             0.68 m/s  (0.42-0.7 m/s) E/A Ratio:             0.90      (1.0-2.2) MV e'                   0.080 m/s (>8.0) MV lateral e'          0.09 m/s MV medial e'           0.07 m/s E/e' Ratio:            7.61      (<8.0)  MITRAL VALVE:           Normal Ranges: MV DT:        5649 msec (150-240msec)  AORTIC VALVE:                     Normal Ranges: AoV Vmax:                1.28 m/s (<=1.7m/s) AoV Peak P.5 mmHg (<20mmHg) AoV Mean PG:             3.9 mmHg (1.7-11.5mmHg) LVOT Max Issa:            1.16 m/s (<=1.1m/s) AoV VTI:                 27.74 cm (18-25cm) LVOT VTI:                24.43 cm LVOT Diameter:           2.12 cm  (1.8-2.4cm) AoV Area, VTI:           3.11 cm2 (2.5-5.5cm2) AoV Area,Vmax:           3.19 cm2 (2.5-4.5cm2) AoV Dimensionless Index: 0.88  RIGHT VENTRICLE: RV Basal 4.16 cm RV Mid   3.10 cm RV Major 7.9 cm TAPSE:   27.0 mm RV s'    0.14 m/s  TRICUSPID VALVE/RVSP:          Normal Ranges: Peak TR Velocity:     2.89 m/s RV Syst Pressure:     36 mmHg  (< 30mmHg) IVC Diam:             2.35 cm  PULMONIC VALVE:          Normal Ranges: PV Accel Time:  94 msec  (>120ms) PV Max Issa:     0.7 m/s  (0.6-0.9m/s) PV Max P.0 mmHg  AORTA: Asc Ao Diam 2.70 cm  [id number] Shawn Barrett DO Electronically signed on 2025 at 1:24:52 PM  ** Final **     Transthoracic Echo (TTE) Complete    Result Date: 2024           [place] [id number]            Phone [phone] TRANSTHORACIC ECHOCARDIOGRAM REPORT  Patient Name:      CHARLES JONNA Donis Physician:    [id number] Manish Mtz MD Study Date:        2024             Ordering Provider:    [id number] CHAPARRO DÍAZ MRN/PID:           [id number]              Fellow: Accession#:        [id number]          Nurse: Date of Birth/Age: 1970 / 53 years  Sonographer:          Lincoln Rayo RDCS Gender:             M                     Additional Staff: Height:            180.00 cm             Admit Date: Weight:            75.00 kg              Admission Status:     Inpatient -                                                                Routine BSA / BMI:         1.94 m2 / 23.15 kg/m2 Department Location:  [facility] Blood Pressure: 149 /102 mmHg Study Type:    TRANSTHORACIC ECHO (TTE) COMPLETE Diagnosis/ICD: Abnormal electrocardiogram [ECG] [EKG]-R94.31 Indication:    Abn Ekg / SVT CPT Codes:     Echo Complete w Full Doppler-[id number] Patient History: Pertinent History: A-Fib, CHF, CAD, HTN, Hyperlipidemia and Murmur. Study Detail: The following Echo studies were performed: 2D, M-Mode, Doppler and               color flow.  PHYSICIAN INTERPRETATION: Left Ventricle: Left ventricular systolic function is moderately decreased, with an estimated ejection fraction of 35%. There is global hypokinesis of the left ventricle with minor regional variations. The left ventricular cavity size is normal. Spectral Doppler shows a normal pattern of left ventricular diastolic filling. Left Atrium: The left atrium is normal in size. Right Ventricle: The right ventricle is normal in size. There is normal right ventricular global systolic function. Right Atrium: The right atrium is normal in size. Aortic Valve: The aortic valve is trileaflet. There is no evidence of aortic valve regurgitation. The peak instantaneous gradient of the aortic valve is 3.1 mmHg. The mean gradient of the aortic valve is 1.7 mmHg. Mitral Valve: The mitral valve is normal in structure. There is trace mitral valve regurgitation. Tricuspid Valve: The tricuspid valve is structurally normal. There is trace tricuspid regurgitation. Pulmonic Valve: The pulmonic valve is not well visualized. The pulmonic valve regurgitation was not well visualized. Pericardium: There is no pericardial effusion noted. Aorta: The aortic root is normal.  CONCLUSIONS:  1. Left ventricular  systolic function is moderately decreased with a 35% estimated ejection fraction.  2. Trace mitral valve regurgitation.  3. Trace tricuspid regurgitation is visualized.  4. There is global hypokinesis of the left ventricle with minor regional variations. QUANTITATIVE DATA SUMMARY: 2D MEASUREMENTS:                          Normal Ranges: LAs:           3.49 cm   (2.7-4.0cm) IVSd:          0.99 cm   (0.6-1.1cm) LVPWd:         1.10 cm   (0.6-1.1cm) LVIDd:         4.93 cm   (3.9-5.9cm) LVIDs:         4.22 cm LV Mass Index: 96.9 g/m2 LV % FS        14.6 % LA VOLUME:                              Normal Ranges: LA Vol A4C:       79.5 ml    (22+/-6mL/m2) LA Vol A2C:       61.2 ml LA Vol BP:        72.7 ml LA Vol Index A4C: 40.9 ml/m2 LA Vol Index A2C: 31.5 ml/m2 LA Vol Index BP:  37.4 ml/m2 LA Volume Index:  38.0 ml/m2 LA Vol A4C:       74.3 ml LA Vol A2C:       56.7 ml RA VOLUME BY A/L METHOD:                       Normal Ranges: RA Area A4C: 20.0 cm2 LV SYSTOLIC FUNCTION BY 2D PLANIMETRY (MOD):                     Normal Ranges: EF-A4C View: 28.7 % (>=55%) EF-A2C View: 36.6 % EF-Biplane:  33.5 % LV DIASTOLIC FUNCTION:                        Normal Ranges: MV Peak E:    0.46 m/s (0.7-1.2 m/s) MV Peak A:    0.43 m/s (0.42-0.7 m/s) E/A Ratio:    1.09     (1.0-2.2) MV e'         0.06 m/s (>8.0) MV lateral e' 0.07 m/s MV medial e'  0.06 m/s E/e' Ratio:   7.15     (<8.0) MITRAL VALVE:                 Normal Ranges: MV DT: 156 msec (150-240msec) AORTIC VALVE:                                   Normal Ranges: AoV Vmax:                0.88 m/s (<=1.7m/s) AoV Peak PG:             3.1 mmHg (<20mmHg) AoV Mean P.7 mmHg (1.7-11.5mmHg) LVOT Max Issa:            0.84 m/s (<=1.1m/s) AoV VTI:                 15.84 cm (18-25cm) LVOT VTI:                15.43 cm LVOT Diameter:           2.14 cm  (1.8-2.4cm) AoV Area, VTI:           3.51 cm2 (2.5-5.5cm2) AoV Area,Vmax:           3.47 cm2 (2.5-4.5cm2) AoV Dimensionless Index:  0.97  RIGHT VENTRICLE: RV Basal 4.24 cm RV Mid   2.90 cm RV Major 7.3 cm RV s'    0.08 m/s TRICUSPID VALVE/RVSP:                             Normal Ranges: Peak TR Velocity: 2.78 m/s RV Syst Pressure: 38.9 mmHg (< 30mmHg) IVC Diam:         2.14 cm PULMONIC VALVE:                      Normal Ranges: PV Max Issa: 0.6 m/s  (0.6-0.9m/s) PV Max P.3 mmHg PV Mean P.7 mmHg PV VTI:     10.49 cm  [id number] Manish Mtz MD Electronically signed on 2024 at 6:03:32 PM  ** Final **          Transthoracic Echo (TTE) Complete 2024    Ejection Fractions:  EF   Date/Time Value Ref Range Status   2025 08:43 AM 63 %      Cath:  No results found for this or any previous visit from the past 1095 days.    Stress Test:  Nuclear Stress Test 2025     FINDINGS:  PERFUSION WITH STRESS:  Normal perfusion to all walls of the left  ventricular myocardium. PERFUSION AT REST:  Unchanged from perfusion  with stress. Gated study:  Normal motion and wall thickening.  Estimated left ventricular ejection fraction:  48%.      Left ventricular chamber size:  117 mL.      IMPRESSION:  1. There is no evidence for stress induced ischemia or prior  myocardial infarction.  2. The left ventricle is of normal size and demonstrates borderline  decreased wall motion with an estimated ejection fraction of  48%.      Cardiac Imaging:  No results found for this or any previous visit from the past 1095 days.      Past Medical History:  He has a past medical history of Hypertension.    Past Surgical History:  He has no past surgical history on file.      Social History:  He reports that he has been smoking cigarettes. He started smoking about 35 years ago. He has a 70.2 pack-year smoking history. He has quit using smokeless tobacco. He reports current drug use. Drug: Marijuana. No history on file for alcohol use.    Family History:  No family history on file.     Allergies:  Shellfish derived    Inpatient Medications:  Scheduled  medications   Medication Dose Route Frequency    carvedilol  6.25 mg oral BID    [START ON 1/31/2025] fluticasone furoate-vilanteroL  1 puff inhalation Daily    ipratropium-albuteroL  3 mL nebulization Once    losartan  25 mg oral Daily    thiamine  100 mg oral Daily     PRN medications   Medication    ipratropium-albuteroL    metoprolol     Continuous Medications   Medication Dose Last Rate     Outpatient Medications:  Current Outpatient Medications   Medication Instructions    amiodarone (PACERONE) 200 mg, oral, Daily    carvedilol (COREG) 6.25 mg, oral, 2 times daily (morning and late afternoon)    spironolactone (ALDACTONE) 12.5 mg, oral, Daily    Vitamin B-1 100 mg, oral, Daily     CT angio chest:  1.  No evidence of acute pulmonary embolus.  No acute infiltrates or  effusions.  No evidence of pneumothorax.  2.  Mild focal soft tissue attenuation within the right superior hilum  may reflect a mildly enlarged right hilar lymph node.  Small hilar  soft tissue mass cannot be excluded.  Short-term follow-up CT imaging  within 3 months recommended to assess for stability.   3.  Small pulmonary nodules measuring up to 4 mm involving the right  upper lobe, left upper lobe, and left lower lobe.    Physical Exam:  On examination the patient is alert and oriented x 3 no acute distress he is slightly at the end appearing male.  He is some barrel chest change he is upper chest.  Neck veins are not elevated upstrokes and volumes are normal there are no carotid bruits.  His midline lungs have some diminution of breath sounds in the bases  Normal sounding S1 single S2 without cardiac murmurs the PMI is nondisplaced.  Abdomen is unremarkable extremities have no peripheral edema pulses are palpable bilaterally     Assessment/Plan   54-year-old male with previous episodes of SVT/AVNRT treated with beta-blocker and amiodarone presents with recurrent episode ultimately terminated with vagal maneuvers.  Prior cardiomyopathy recent  nuclear stress test negative for ischemia suspect either tachycardia mediated with some possible ethanol component.  Last echocardiogram ejection fraction normalized with nuclear stress testing EF of 48%  Currently in sinus rhythm, most benefit and curative approach would be for RFA particularly with prior CMO.  Will discontinue amiodarone with continuation of beta-blocker  Patient is hypertensive with a nuclear ejection fraction still less than completely normal will add losartan  Keep K+>4 and Mg+ >2  Patient in agreement after discussion regarding RFA  Scheduled as an outpatient on February 5 th  In terms of beta-blocker should be instructed to take one half dose twice daily starting February 1, Saturday and Sunday and then discontinue with no beta-blocker Monday Tuesday procedure scheduled for Wednesday   Would again discuss smoking cessation, could offer nicotine patch and decrease ethanol  CT angio chest; right upper lobe nodule would consider pulmonary consult with tobacco history or outpatient follow-up     Discussed with Dr. Williamson and hospitalist service  For the consult on this pleasant gentleman       Code Status:  Full Code    I spent 60 minutes in the professional and overall care of this patient.        Uma Sandoval, APRN-CNP

## 2025-01-30 NOTE — ED PROVIDER NOTES
HPI   Chief Complaint   Patient presents with    Rapid Heart Rate     Pt states his heart rate has been elevated x 1 hour. Midsternal cp       HPI  Patient is a 54-year-old male with history of hypertension and nicotine dependence presenting for evaluation of rapid heart rate that started 1 hour prior to arrival.  Patient states that he used his albuterol inhaler and states that since then he has had palpitations and midsternal chest pain.  He states he has been to the hospital a few times before for rapid heart rate but he is unsure what he is diagnosed with.  He does not follow-up with cardiology.  He states he does smoke 2 packs a day for the past 35 years.  Denies any history of DVT or PE.  No recent travel or hospitalization.  No history of CAD.  Otherwise has no acute complaints.      Patient History   Past Medical History:   Diagnosis Date    Hypertension      No past surgical history on file.  No family history on file.  Social History     Tobacco Use    Smoking status: Every Day     Current packs/day: 2.00     Average packs/day: 2.0 packs/day for 35.1 years (70.2 ttl pk-yrs)     Types: Cigarettes     Start date: 1990    Smokeless tobacco: Former   Substance Use Topics    Alcohol use: Not on file    Drug use: Yes     Types: Marijuana       Physical Exam   ED Triage Vitals [01/30/25 0811]   Temperature Heart Rate Respirations BP   37.1 °C (98.8 °F) (!) 148 18 (!) 141/113      Pulse Ox Temp src Heart Rate Source Patient Position   100 % -- -- Sitting      BP Location FiO2 (%)     Left arm --       Physical Exam  Vitals and nursing note reviewed.   Constitutional:       General: He is not in acute distress.     Appearance: He is well-developed.   HENT:      Head: Normocephalic and atraumatic.   Eyes:      Conjunctiva/sclera: Conjunctivae normal.   Cardiovascular:      Rate and Rhythm: Regular rhythm. Tachycardia present.      Heart sounds: No murmur heard.  Pulmonary:      Effort: Pulmonary effort is normal. No  respiratory distress.      Comments: Lungs with expiratory rhonchi bilaterally  Abdominal:      Palpations: Abdomen is soft.      Tenderness: There is no abdominal tenderness.   Musculoskeletal:         General: No swelling.      Cervical back: Neck supple.   Skin:     General: Skin is warm and dry.      Capillary Refill: Capillary refill takes less than 2 seconds.   Neurological:      Mental Status: He is alert.   Psychiatric:         Mood and Affect: Mood normal.           ED Course & MDM   ED Course as of 01/30/25 1147   u Jan 30, 2025   1011 Did receive a call from radiology regarding the right hilar lymph node on patient CT scan at that he states may be at risk for possible masslike structure and needs close follow-up within 3 months.  Will relay information to patient. [JJ]      ED Course User Index  [JJ] Keshia Cruz PA-C         Diagnoses as of 01/30/25 1147   Dyspnea, unspecified type   Palpitations   Diastolic hypertension   SVT (supraventricular tachycardia) (CMS-HCC)                 No data recorded     [place] Coma Scale Score: 15 (01/30/25 0811 : Marilu Alba RN)                           Medical Decision Making  Parts of this chart have been completed using voice recognition software. Please excuse any errors of transcription.  My thought process and reason for plan has been formulated from the time that I saw the patient until the time of disposition and is not specific to one specific moment during their visit and furthermore my MDM encompasses this entire chart and not only this text box.      HPI: Detailed above.    Exam: A medically appropriate exam performed, outlined above, given the known history and presentation.    History obtained from: Patient    EKG: Interpreted by attending physician and reviewed by me    Social Determinants of Health considered during this visit: Lives independently, does not follow-up with providers    Medications given during visit:  Medications    ipratropium-albuteroL (Duo-Neb) 0.5-2.5 mg/3 mL nebulizer solution 3 mL (has no administration in time range)   sodium chloride 0.9 % bolus 500 mL (500 mL intravenous New Bag 1/30/25 1119)   sodium chloride 0.9 % bolus 500 mL (0 mL intravenous Stopped 1/30/25 0924)   iohexol (OMNIPaque) 350 mg iodine/mL solution 75 mL (75 mL intravenous Given 1/30/25 0923)   metoprolol tartrate (Lopressor) injection 5 mg (5 mg intravenous Given 1/30/25 1010)   metoprolol tartrate (Lopressor) injection 5 mg (5 mg intravenous Given 1/30/25 1050)        Diagnostic/tests  Labs Reviewed   CBC WITH AUTO DIFFERENTIAL - Abnormal       Result Value    WBC 15.7 (*)     nRBC 0.0      RBC 5.39      Hemoglobin 14.5      Hematocrit 42.3      MCV 79 (*)     MCH 26.9      MCHC 34.3      RDW 17.6 (*)     Platelets 488 (*)     Neutrophils % 69.7      Immature Granulocytes %, Automated 0.3      Lymphocytes % 22.6      Monocytes % 5.4      Eosinophils % 1.1      Basophils % 0.9      Neutrophils Absolute 10.92 (*)     Immature Granulocytes Absolute, Automated 0.05      Lymphocytes Absolute 3.54      Monocytes Absolute 0.85      Eosinophils Absolute 0.18      Basophils Absolute 0.14 (*)    COMPREHENSIVE METABOLIC PANEL - Abnormal    Glucose 165 (*)     Sodium 137      Potassium 4.3      Chloride 105      Bicarbonate 24      Anion Gap 12      Urea Nitrogen 19      Creatinine 0.99      eGFR >90      Calcium 8.9      Albumin 4.3      Alkaline Phosphatase 64      Total Protein 6.7      AST 21      Bilirubin, Total 0.5      ALT 21     B-TYPE NATRIURETIC PEPTIDE - Abnormal     (*)     Narrative:        <100 pg/mL - Heart failure unlikely  100-299 pg/mL - Intermediate probability of acute heart                  failure exacerbation. Correlate with clinical                  context and patient history.    >=300 pg/mL - Heart Failure likely. Correlate with clinical                  context and patient history.    BNP testing is performed using different  testing methodology at [facility] than at [facility]. Direct result comparisons should only be made within the same method.      URINALYSIS WITH REFLEX CULTURE AND MICROSCOPIC - Abnormal    Color, Urine Light-Yellow      Appearance, Urine Clear      Specific Gravity, Urine 1.036 (*)     pH, Urine 6.0      Protein, Urine NEGATIVE      Glucose, Urine Normal      Blood, Urine NEGATIVE      Ketones, Urine NEGATIVE      Bilirubin, Urine NEGATIVE      Urobilinogen, Urine Normal      Nitrite, Urine NEGATIVE      Leukocyte Esterase, Urine NEGATIVE     LACTATE - Abnormal    Lactate 2.1 (*)     Narrative:     Venipuncture immediately after or during the administration of Metamizole may lead to falsely low results. Testing should be performed immediately prior to Metamizole dosing.   TSH WITH REFLEX TO FREE T4 IF ABNORMAL - Normal    Thyroid Stimulating Hormone 1.75      Narrative:     TSH testing is performed using different testing methodology at [facility] than at [facility]. Direct result comparisons should only be made within the same method.     LIPASE - Normal    Lipase 17      Narrative:     Venipuncture immediately after or during the administration of Metamizole may lead to falsely low results. Testing should be performed immediately prior to Metamizole dosing.   SARS-COV-2 AND INFLUENZA A/B PCR - Normal    Flu A Result Not Detected      Flu B Result Not Detected      Coronavirus 2019, PCR Not Detected      Narrative:     This assay is an FDA-cleared, in vitro diagnostic nucleic acid amplification test for the qualitative detection and differentiation of SARS CoV-2/ Influenza A/B from nasopharyngeal specimens collected from individuals with signs and symptoms of respiratory tract infections, and has been validated for use at [facility]. Negative results do not preclude COVID-19/ Influenza A/B infections and should not be used as the sole  basis for diagnosis, treatment, or other management decisions. Testing for SARS CoV-2 is recommended only for patients who meet current clinical and/or epidemiological criteria defined by federal, state, or local public health directives.   SERIAL TROPONIN-INITIAL - Normal    Troponin I, High Sensitivity 7      Narrative:     Less than 99th percentile of normal range cutoff-  Female and children under 18 years old <14 ng/L; Male <21 ng/L: Negative  Repeat testing should be performed if clinically indicated.     Female and children under 18 years old 14-50 ng/L; Male 21-50 ng/L:  Consistent with possible cardiac damage and possible increased clinical   risk. Serial measurements may help to assess extent of myocardial damage.     >50 ng/L: Consistent with cardiac damage, increased clinical risk and  myocardial infarction. Serial measurements may help assess extent of   myocardial damage.      NOTE: Children less than 1 year old may have higher baseline troponin   levels and results should be interpreted in conjunction with the overall   clinical context.     NOTE: Troponin I testing is performed using a different   testing methodology at [facility] than at other   [facility]. Direct result comparisons should only   be made within the same method.   SERIAL TROPONIN, 1 HOUR - Normal    Troponin I, High Sensitivity 8      Narrative:     Less than 99th percentile of normal range cutoff-  Female and children under 18 years old <14 ng/L; Male <21 ng/L: Negative  Repeat testing should be performed if clinically indicated.     Female and children under 18 years old 14-50 ng/L; Male 21-50 ng/L:  Consistent with possible cardiac damage and possible increased clinical   risk. Serial measurements may help to assess extent of myocardial damage.     >50 ng/L: Consistent with cardiac damage, increased clinical risk and  myocardial infarction. Serial measurements may help assess extent of   myocardial damage.       NOTE: Children less than 1 year old may have higher baseline troponin   levels and results should be interpreted in conjunction with the overall   clinical context.     NOTE: Troponin I testing is performed using a different   testing methodology at [facility] than at other   [facility]. Direct result comparisons should only   be made within the same method.   LACTATE - Normal    Lactate 1.2      Narrative:     Venipuncture immediately after or during the administration of Metamizole may lead to falsely low results. Testing should be performed immediately prior to Metamizole dosing.   TROPONIN SERIES- (INITIAL, 1 HR)    Narrative:     The following orders were created for panel order Troponin I Series, High Sensitivity (0, 1 HR).  Procedure                               Abnormality         Status                     ---------                               -----------         ------                     Troponin I, High Sensiti...[[id number]]  Normal              Final result               Troponin, High Sensitivi...[[id number]]  Normal              Final result                 Please view results for these tests on the individual orders.   URINALYSIS WITH REFLEX CULTURE AND MICROSCOPIC    Narrative:     The following orders were created for panel order Urinalysis with Reflex Culture and Microscopic.  Procedure                               Abnormality         Status                     ---------                               -----------         ------                     Urinalysis with Reflex C...[[id number]]  Abnormal            Final result               Extra Urine Gray Tube[[id number]]                            In process                   Please view results for these tests on the individual orders.   EXTRA URINE GRAY TUBE      CT angio chest for pulmonary embolism   Final Result   Addendum (preliminary) 1 of 1   Critical value: These findings were discussed with Keshia Cruz PA-C   on  1/30/2025 at 10:12 AM.   Signed by Fermín Chew MD      Final   1.  No evidence of acute pulmonary embolus.  No acute infiltrates or   effusions.  No evidence of pneumothorax.   2.  Mild focal soft tissue attenuation within the right superior hilum   may reflect a mildly enlarged right hilar lymph node.  Small hilar   soft tissue mass cannot be excluded.  Short-term follow-up CT imaging   within 3 months recommended to assess for stability.    3.  Small pulmonary nodules measuring up to 4 mm involving the right   upper lobe, left upper lobe, and left lower lobe.  Follow-up CT   imaging recommended according to Fleischner Society recommendations.    See below.   Fleischner Society 2017 Guidelines for Management of Incidentally   Detected Pulmonary Nodules in Adults:   A.  SOLID NODULES*   Nodule Type and Size:   Single:   *Low Risk**    < 6 mm - No routine follow-up   6-8 mm - CT at 6-12 months, then consider CT at 18-24 months   > 8 mm - Consider CT at 3 months, PET/CT, or tissue sampling   *High Risk**   < 6 mm - Optional CT at 12 months   6-8 mm - CT at 6-12 months, then CT at 18-24 months   > 8 mm - Consider CT at 3 months, PET/CT, or tissue sampling   Multiple:   *Low Risk**    < 6 mm - No routine follow-up   6-8 mm - CT at 3-6 months, then consider CT at 18-24 months   > 8 mm - CT at 3-6 months, then consider CT at 18-24 months   *High Risk**   < 6 mm - Optional CT at 12 months   6-8 mm - CT at 3-6 months, then at 18-24 months   > 8 mm - CT at 3-6 months, then at 18-24 months   B. SUBSOLID NODULES*   Nodule Type and Size:   Single:     *Ground glass   < 6 mm - No routine follow-up   > 6 mm - CT at 6-12 months to confirm persistence, then CT every 2   years until 5 years   *Part Solid   < 6 mm - No routine follow-up   > 6 mm - CT at 3-6 months to confirm persistence.  If unchanged and   solid component remains < 6 mm, annual CT should be performed for 5   years.   Multiple:   < 6 mm - CT at 3-6 months.  If  stable, consider CT at 2 and 4 years.   > 6 mm - CT at 3-6 months.  Subsequent management based on the most   suspicious nodule(s).   Note - These recommendations do not apply to lung cancer screening,   patients with immunosuppression, or patients with known primary   cancer.   * Dimensions are average of long and short axes, rounded to the   nearest millimeter.   ** Consider all relevant risk factors.   Signed by Fermín Chew MD           Considerations/further MDM:  Patient is a 54-year-old male presenting for evaluation of rapid heart rate, chest pain    Differential diagnosis associated with the patient presentation includes: Arrhythmia versus ACS versus pneumonia versus PE    Chart review was performed with multiple episodes of previous SVT with recent hospitalization on 1/15/2025 for SVT.    I saw this patient in conjunction with Dr. Parker    Patient is well-appearing in no apparent distress during the visit.  Vital signs are consistent with hypertension and tachycardia but otherwise unremarkable.  The patient has no evidence of airway compromise or respiratory distress on exam.  Pulses are equal bilaterally.  Heart rate is regular and rapid on exam.  EKG performed with evidence of S VT but without acute ischemia.  Patient is without evidence of sepsis or toxicity on exam.  No recent illness or flulike symptoms to suggest infectious etiology.  Patient provided fluids, IV metoprolol 5 mg and repeat dose of IV metoprolol 5 mg with improvement of tachycardia.  CT angio chest for PE was pursued without evidence of pneumonia, pneumothorax, pulmonary embolism or findings consistent with CHF.  Initial lactate was 2.1 with repeat lactate 1.2.  Given patient's persistent tachycardia and SVT, will admit for further management of symptoms.  Patient counseled on all findings and agreeable with plan of care.  In the management and evaluation of the patient’s presenting complaint, the finding of pulmonary nodules,  hilar lymph node was found. It is not felt that this has a relationship to the current presenting complaint. However, this finding was verbally discussed with the patient and the importance of outpatient follow-up was stressed.        Procedure  Critical Care    Performed by: Keshia Cruz PA-C  Authorized by: Mariana Parker MD    Critical care provider statement:     Critical care time (minutes):  32    Critical care time was exclusive of:  Separately billable procedures and treating other patients    Critical care was necessary to treat or prevent imminent or life-threatening deterioration of the following conditions: SVT with IV medications.    Critical care was time spent personally by me on the following activities:  Development of treatment plan with patient or surrogate, evaluation of patient's response to treatment, examination of patient, obtaining history from patient or surrogate, ordering and performing treatments and interventions, ordering and review of laboratory studies, ordering and review of radiographic studies, re-evaluation of patient's condition and review of old charts    Care discussed with: admitting provider         Keshia Cruz PA-C  01/30/25 1143

## 2025-01-30 NOTE — CARE PLAN
Pt does not have a POA or Living Will  ADOD: 4 days    Pt lives at home with his dtr in a 2nd floor apt bldg, no elevator; pt said that he must climb 3 flights of stairs to enter the apt  He works [employer]. He does not use any assistive device when ambulating  He drives and is independent with ADL's  He wears glasses, no hearing aids. He said that he can read and comprehend what he is reading  He does not use home 02.cpap/bipap/nebulizer.  He is not a diabetic.  Pt denies depression and anxiety  Pt is here for SVT, new onset  No anticipated discharge needs    DISCHARGE PLAN: HOME WITH DTR

## 2025-01-31 VITALS
RESPIRATION RATE: 18 BRPM | HEART RATE: 60 BPM | WEIGHT: 160 LBS | TEMPERATURE: 97.7 F | BODY MASS INDEX: 22.4 KG/M2 | HEIGHT: 71 IN | OXYGEN SATURATION: 98 % | DIASTOLIC BLOOD PRESSURE: 96 MMHG | SYSTOLIC BLOOD PRESSURE: 160 MMHG

## 2025-01-31 PROBLEM — R06.00 DYSPNEA, UNSPECIFIED TYPE: Status: RESOLVED | Noted: 2025-01-30 | Resolved: 2025-01-31

## 2025-01-31 LAB
ANION GAP SERPL CALCULATED.3IONS-SCNC: 11 MMOL/L (ref 10–20)
BUN SERPL-MCNC: 19 MG/DL (ref 6–23)
CALCIUM SERPL-MCNC: 8.3 MG/DL (ref 8.6–10.3)
CHLORIDE SERPL-SCNC: 104 MMOL/L (ref 98–107)
CO2 SERPL-SCNC: 25 MMOL/L (ref 21–32)
CREAT SERPL-MCNC: 0.94 MG/DL (ref 0.5–1.3)
EGFRCR SERPLBLD CKD-EPI 2021: >90 ML/MIN/1.73M*2
ERYTHROCYTE [DISTWIDTH] IN BLOOD BY AUTOMATED COUNT: 17.2 % (ref 11.5–14.5)
GLUCOSE SERPL-MCNC: 95 MG/DL (ref 74–99)
HCT VFR BLD AUTO: 37.7 % (ref 41–52)
HGB BLD-MCNC: 12.7 G/DL (ref 13.5–17.5)
MCH RBC QN AUTO: 26.5 PG (ref 26–34)
MCHC RBC AUTO-ENTMCNC: 33.7 G/DL (ref 32–36)
MCV RBC AUTO: 79 FL (ref 80–100)
NRBC BLD-RTO: 0 /100 WBCS (ref 0–0)
PLATELET # BLD AUTO: 408 X10*3/UL (ref 150–450)
POTASSIUM SERPL-SCNC: 3.9 MMOL/L (ref 3.5–5.3)
Q ONSET: 230 MS
QRS COUNT: 26 BEATS
QRS DURATION: 76 MS
QT INTERVAL: 298 MS
QTC CALCULATION(BAZETT): 480 MS
QTC FREDERICIA: 409 MS
R AXIS: -2 DEGREES
RBC # BLD AUTO: 4.8 X10*6/UL (ref 4.5–5.9)
SODIUM SERPL-SCNC: 136 MMOL/L (ref 136–145)
T AXIS: 62 DEGREES
T OFFSET: 379 MS
VENTRICULAR RATE: 156 BPM
WBC # BLD AUTO: 12.6 X10*3/UL (ref 4.4–11.3)

## 2025-01-31 PROCEDURE — 94664 DEMO&/EVAL PT USE INHALER: CPT

## 2025-01-31 PROCEDURE — 2500000002 HC RX 250 W HCPCS SELF ADMINISTERED DRUGS (ALT 637 FOR MEDICARE OP, ALT 636 FOR OP/ED): Performed by: INTERNAL MEDICINE

## 2025-01-31 PROCEDURE — 9420000001 HC RT PATIENT EDUCATION 5 MIN

## 2025-01-31 PROCEDURE — 85027 COMPLETE CBC AUTOMATED: CPT | Performed by: INTERNAL MEDICINE

## 2025-01-31 PROCEDURE — 99238 HOSP IP/OBS DSCHRG MGMT 30/<: CPT | Performed by: INTERNAL MEDICINE

## 2025-01-31 PROCEDURE — 80048 BASIC METABOLIC PNL TOTAL CA: CPT | Performed by: INTERNAL MEDICINE

## 2025-01-31 PROCEDURE — 2500000001 HC RX 250 WO HCPCS SELF ADMINISTERED DRUGS (ALT 637 FOR MEDICARE OP): Performed by: REGISTERED NURSE

## 2025-01-31 PROCEDURE — 94640 AIRWAY INHALATION TREATMENT: CPT

## 2025-01-31 PROCEDURE — 2500000001 HC RX 250 WO HCPCS SELF ADMINISTERED DRUGS (ALT 637 FOR MEDICARE OP): Performed by: INTERNAL MEDICINE

## 2025-01-31 PROCEDURE — 36415 COLL VENOUS BLD VENIPUNCTURE: CPT | Performed by: INTERNAL MEDICINE

## 2025-01-31 RX ORDER — HYDROCHLOROTHIAZIDE 12.5 MG/1
12.5 CAPSULE ORAL DAILY
Status: DISCONTINUED | OUTPATIENT
Start: 2025-01-31 | End: 2025-01-31 | Stop reason: HOSPADM

## 2025-01-31 RX ORDER — FLUTICASONE PROPIONATE AND SALMETEROL 500; 50 UG/1; UG/1
1 POWDER RESPIRATORY (INHALATION)
Qty: 60 EACH | Refills: 2 | Status: SHIPPED | OUTPATIENT
Start: 2025-01-31

## 2025-01-31 RX ORDER — LOSARTAN POTASSIUM AND HYDROCHLOROTHIAZIDE 12.5; 5 MG/1; MG/1
1 TABLET ORAL DAILY
Qty: 30 TABLET | Refills: 3 | Status: SHIPPED | OUTPATIENT
Start: 2025-01-31

## 2025-01-31 RX ORDER — LOSARTAN POTASSIUM 25 MG/1
25 TABLET ORAL ONCE
Status: COMPLETED | OUTPATIENT
Start: 2025-01-31 | End: 2025-01-31

## 2025-01-31 RX ADMIN — HYDROCHLOROTHIAZIDE 12.5 MG: 12.5 CAPSULE ORAL at 12:59

## 2025-01-31 RX ADMIN — CARVEDILOL 6.25 MG: 6.25 TABLET, FILM COATED ORAL at 18:11

## 2025-01-31 RX ADMIN — CARVEDILOL 6.25 MG: 6.25 TABLET, FILM COATED ORAL at 08:56

## 2025-01-31 RX ADMIN — THIAMINE HCL TAB 100 MG 100 MG: 100 TAB at 08:56

## 2025-01-31 RX ADMIN — IPRATROPIUM BROMIDE AND ALBUTEROL SULFATE 3 ML: 2.5; .5 SOLUTION RESPIRATORY (INHALATION) at 11:28

## 2025-01-31 RX ADMIN — LOSARTAN POTASSIUM 25 MG: 25 TABLET, FILM COATED ORAL at 12:59

## 2025-01-31 RX ADMIN — LOSARTAN POTASSIUM 25 MG: 25 TABLET, FILM COATED ORAL at 08:56

## 2025-01-31 RX ADMIN — IPRATROPIUM BROMIDE AND ALBUTEROL SULFATE 3 ML: 2.5; .5 SOLUTION RESPIRATORY (INHALATION) at 02:24

## 2025-01-31 SDOH — HEALTH STABILITY: MENTAL HEALTH: HOW OFTEN DO YOU HAVE A DRINK CONTAINING ALCOHOL?: 4 OR MORE TIMES A WEEK

## 2025-01-31 SDOH — SOCIAL STABILITY: SOCIAL NETWORK: HOW OFTEN DO YOU ATTEND MEETINGS OF THE CLUBS OR ORGANIZATIONS YOU BELONG TO?: NEVER

## 2025-01-31 SDOH — HEALTH STABILITY: MENTAL HEALTH: EXPERIENCED ANY OF THE FOLLOWING LIFE EVENTS: OTHER (COMMENT)

## 2025-01-31 SDOH — SOCIAL STABILITY: SOCIAL INSECURITY: HAS ANYONE EVER THREATENED TO HURT YOUR FAMILY OR YOUR PETS?: NO

## 2025-01-31 SDOH — SOCIAL STABILITY: SOCIAL INSECURITY: HAVE YOU HAD ANY THOUGHTS OF HARMING ANYONE ELSE?: NO

## 2025-01-31 SDOH — SOCIAL STABILITY: SOCIAL INSECURITY: POSSIBLE ABUSE REPORTED TO:: OTHER (COMMENT)

## 2025-01-31 SDOH — SOCIAL STABILITY: SOCIAL INSECURITY: ARE YOU OR HAVE YOU BEEN THREATENED OR ABUSED PHYSICALLY, EMOTIONALLY, OR SEXUALLY BY ANYONE?: NO

## 2025-01-31 SDOH — SOCIAL STABILITY: SOCIAL INSECURITY: DO YOU FEEL UNSAFE GOING BACK TO THE PLACE WHERE YOU ARE LIVING?: NO

## 2025-01-31 SDOH — HEALTH STABILITY: MENTAL HEALTH
DO YOU FEEL STRESS - TENSE, RESTLESS, NERVOUS, OR ANXIOUS, OR UNABLE TO SLEEP AT NIGHT BECAUSE YOUR MIND IS TROUBLED ALL THE TIME - THESE DAYS?: ONLY A LITTLE

## 2025-01-31 SDOH — SOCIAL STABILITY: SOCIAL INSECURITY: DO YOU FEEL ANYONE HAS EXPLOITED OR TAKEN ADVANTAGE OF YOU FINANCIALLY OR OF YOUR PERSONAL PROPERTY?: NO

## 2025-01-31 SDOH — SOCIAL STABILITY: SOCIAL NETWORK: HOW OFTEN DO YOU GET TOGETHER WITH FRIENDS OR RELATIVES?: NEVER

## 2025-01-31 SDOH — HEALTH STABILITY: MENTAL HEALTH: HOW MANY DRINKS CONTAINING ALCOHOL DO YOU HAVE ON A TYPICAL DAY WHEN YOU ARE DRINKING?: 1 OR 2

## 2025-01-31 SDOH — SOCIAL STABILITY: SOCIAL INSECURITY: ABUSE: ADULT

## 2025-01-31 SDOH — SOCIAL STABILITY: SOCIAL INSECURITY: HAVE YOU HAD THOUGHTS OF HARMING ANYONE ELSE?: NO

## 2025-01-31 SDOH — HEALTH STABILITY: PHYSICAL HEALTH: ON AVERAGE, HOW MANY DAYS PER WEEK DO YOU ENGAGE IN MODERATE TO STRENUOUS EXERCISE (LIKE A BRISK WALK)?: 0 DAYS

## 2025-01-31 SDOH — HEALTH STABILITY: MENTAL HEALTH: HOW OFTEN DO YOU HAVE SIX OR MORE DRINKS ON ONE OCCASION?: NEVER

## 2025-01-31 SDOH — SOCIAL STABILITY: SOCIAL INSECURITY: WERE YOU ABLE TO COMPLETE ALL THE BEHAVIORAL HEALTH SCREENINGS?: YES

## 2025-01-31 SDOH — SOCIAL STABILITY: SOCIAL INSECURITY: DOES ANYONE TRY TO KEEP YOU FROM HAVING/CONTACTING OTHER FRIENDS OR DOING THINGS OUTSIDE YOUR HOME?: NO

## 2025-01-31 SDOH — SOCIAL STABILITY: SOCIAL NETWORK: IN A TYPICAL WEEK, HOW MANY TIMES DO YOU TALK ON THE PHONE WITH FAMILY, FRIENDS, OR NEIGHBORS?: NEVER

## 2025-01-31 SDOH — HEALTH STABILITY: PHYSICAL HEALTH: ON AVERAGE, HOW MANY MINUTES DO YOU ENGAGE IN EXERCISE AT THIS LEVEL?: 0 MIN

## 2025-01-31 SDOH — SOCIAL STABILITY: SOCIAL INSECURITY: ARE YOU MARRIED, WIDOWED, DIVORCED, SEPARATED, NEVER MARRIED, OR LIVING WITH A PARTNER?: WIDOWED

## 2025-01-31 SDOH — SOCIAL STABILITY: SOCIAL INSECURITY: ARE THERE ANY APPARENT SIGNS OF INJURIES/BEHAVIORS THAT COULD BE RELATED TO ABUSE/NEGLECT?: NO

## 2025-01-31 SDOH — SOCIAL STABILITY: SOCIAL NETWORK: HOW OFTEN DO YOU ATTEND CHURCH OR RELIGIOUS SERVICES?: NEVER

## 2025-01-31 ASSESSMENT — LIFESTYLE VARIABLES
HAVE YOU OR SOMEONE ELSE BEEN INJURED AS A RESULT OF YOUR DRINKING: NO
HOW OFTEN DURING THE LAST YEAR HAVE YOU HAD A FEELING OF GUILT OR REMORSE AFTER DRINKING: NEVER
SUBSTANCE_ABUSE_PAST_12_MONTHS: NO
HOW MANY STANDARD DRINKS CONTAINING ALCOHOL DO YOU HAVE ON A TYPICAL DAY: 1 OR 2
AUDIT TOTAL SCORE: 5
HOW OFTEN DURING THE LAST YEAR HAVE YOU FOUND THAT YOU WERE NOT ABLE TO STOP DRINKING ONCE YOU HAD STARTED: NEVER
HAS A RELATIVE, FRIEND, DOCTOR, OR ANOTHER HEALTH PROFESSIONAL EXPRESSED CONCERN ABOUT YOUR DRINKING OR SUGGESTED YOU CUT DOWN: NO
AUDIT TOTAL SCORE: 4
AUDIT-C TOTAL SCORE: 4
HOW OFTEN DO YOU HAVE 6 OR MORE DRINKS ON ONE OCCASION: NEVER
HOW OFTEN DURING THE LAST YEAR HAVE YOU NEEDED AN ALCOHOLIC DRINK FIRST THING IN THE MORNING TO GET YOURSELF GOING AFTER A NIGHT OF HEAVY DRINKING: NEVER
HAS A RELATIVE, FRIEND, DOCTOR, OR ANOTHER HEALTH PROFESSIONAL EXPRESSED CONCERN ABOUT YOUR DRINKING OR SUGGESTED YOU CUT DOWN: NO
SKIP TO QUESTIONS 9-10: 1
HOW MANY STANDARD DRINKS CONTAINING ALCOHOL DO YOU HAVE ON A TYPICAL DAY: 1 OR 2
HOW OFTEN DO YOU HAVE 6 OR MORE DRINKS ON ONE OCCASION: NEVER
AUDIT TOTAL SCORE: 1
HOW OFTEN DO YOU HAVE A DRINK CONTAINING ALCOHOL: 4 OR MORE TIMES A WEEK
AUDIT-C TOTAL SCORE: 4
SKIP TO QUESTIONS 9-10: 1
AUDIT-C TOTAL SCORE: 4
HOW OFTEN DURING THE LAST YEAR HAVE YOU BEEN UNABLE TO REMEMBER WHAT HAPPENED THE NIGHT BEFORE BECAUSE YOU HAD BEEN DRINKING: NEVER
HOW OFTEN DURING THE LAST YEAR HAVE YOU FOUND THAT YOU WERE NOT ABLE TO STOP DRINKING ONCE YOU HAD STARTED: NEVER
SKIP TO QUESTIONS 9-10: 1
HAVE YOU OR SOMEONE ELSE BEEN INJURED AS A RESULT OF YOUR DRINKING: NO
HOW OFTEN DO YOU HAVE A DRINK CONTAINING ALCOHOL: 4 OR MORE TIMES A WEEK
AUDIT-C TOTAL SCORE: 4
HOW OFTEN DURING THE LAST YEAR HAVE YOU FAILED TO DO WHAT WAS NORMALLY EXPECTED FROM YOU BECAUSE OF DRINKING: NEVER
HOW OFTEN DURING THE LAST YEAR HAVE YOU BEEN UNABLE TO REMEMBER WHAT HAPPENED THE NIGHT BEFORE BECAUSE YOU HAD BEEN DRINKING: LESS THAN MONTHLY
HOW OFTEN DURING THE LAST YEAR HAVE YOU HAD A FEELING OF GUILT OR REMORSE AFTER DRINKING: NEVER
AUDIT-C TOTAL SCORE: 4
AUDIT TOTAL SCORE: 0
HOW OFTEN DURING THE LAST YEAR HAVE YOU FAILED TO DO WHAT WAS NORMALLY EXPECTED FROM YOU BECAUSE OF DRINKING: NEVER
PRESCIPTION_ABUSE_PAST_12_MONTHS: NO
SUBSTANCE_ABUSE_PAST_12_MONTHS: NO
PRESCIPTION_ABUSE_PAST_12_MONTHS: NO
HOW OFTEN DURING THE LAST YEAR HAVE YOU NEEDED AN ALCOHOLIC DRINK FIRST THING IN THE MORNING TO GET YOURSELF GOING AFTER A NIGHT OF HEAVY DRINKING: NEVER

## 2025-01-31 ASSESSMENT — COGNITIVE AND FUNCTIONAL STATUS - GENERAL
DAILY ACTIVITIY SCORE: 24
PATIENT BASELINE BEDBOUND: NO
MOBILITY SCORE: 24

## 2025-01-31 ASSESSMENT — PAIN SCALES - GENERAL
PAINLEVEL_OUTOF10: 0 - NO PAIN

## 2025-01-31 ASSESSMENT — PATIENT HEALTH QUESTIONNAIRE - PHQ9
SUM OF ALL RESPONSES TO PHQ9 QUESTIONS 1 & 2: 0
2. FEELING DOWN, DEPRESSED OR HOPELESS: NOT AT ALL
2. FEELING DOWN, DEPRESSED OR HOPELESS: NOT AT ALL
1. LITTLE INTEREST OR PLEASURE IN DOING THINGS: NOT AT ALL
1. LITTLE INTEREST OR PLEASURE IN DOING THINGS: NOT AT ALL
SUM OF ALL RESPONSES TO PHQ9 QUESTIONS 1 & 2: 0

## 2025-01-31 ASSESSMENT — COLUMBIA-SUICIDE SEVERITY RATING SCALE - C-SSRS
2. HAVE YOU ACTUALLY HAD ANY THOUGHTS OF KILLING YOURSELF?: NO
1. IN THE PAST MONTH, HAVE YOU WISHED YOU WERE DEAD OR WISHED YOU COULD GO TO SLEEP AND NOT WAKE UP?: NO
6. HAVE YOU EVER DONE ANYTHING, STARTED TO DO ANYTHING, OR PREPARED TO DO ANYTHING TO END YOUR LIFE?: NO

## 2025-01-31 ASSESSMENT — PAIN - FUNCTIONAL ASSESSMENT: PAIN_FUNCTIONAL_ASSESSMENT: 0-10

## 2025-01-31 ASSESSMENT — ACTIVITIES OF DAILY LIVING (ADL)
GROOMING: INDEPENDENT
PATIENT'S MEMORY ADEQUATE TO SAFELY COMPLETE DAILY ACTIVITIES?: YES
BATHING: INDEPENDENT
HEARING - LEFT EAR: FUNCTIONAL
TOILETING: INDEPENDENT
ADEQUATE_TO_COMPLETE_ADL: YES
JUDGMENT_ADEQUATE_SAFELY_COMPLETE_DAILY_ACTIVITIES: YES
DRESSING YOURSELF: INDEPENDENT
WALKS IN HOME: INDEPENDENT
FEEDING YOURSELF: INDEPENDENT
HEARING - RIGHT EAR: FUNCTIONAL

## 2025-01-31 NOTE — NURSING NOTE
Assumed care of patient. Patient is resting in bed with no needs at this time. He was made aware to order some breakfast. Call light is in place will continue to monitor.

## 2025-01-31 NOTE — DISCHARGE SUMMARY
Discharge Diagnosis  SVT (supraventricular tachycardia) (CMS-[facility])    Issues Requiring Follow-Up  History of systolic dysfunction and hypertension.  Also has a history of SVT.  Came in with SVT causing acute symptoms.  Was given some metoprolol and some extra amiodarone.  EP NP came by and had him do vagal maneuvers and he converted to sinus rhythm.  Has been in a sinus rhythm since then.  His blood pressures been running a little high.  We have added losartan to his antihypertensive regimen.  He is to have a EP procedure done next week in an attempt to definitively address the SVT.  EP wants him to taper off his carvedilol between now and then.  That appointment is in 5 days.    Discharge Meds     Medication List      START taking these medications     fluticasone propion-salmeteroL 500-50 mcg/dose diskus inhaler; Commonly   known as: Advair Diskus; Inhale 1 puff 2 times a day. Rinse mouth with   water after use to reduce aftertaste and incidence of candidiasis. Do not   swallow.   losartan-hydrochlorothiazide 50-12.5 mg tablet; Commonly known as:   Hyzaar; Take 1 tablet by mouth once daily.     CONTINUE taking these medications     carvedilol 6.25 mg tablet; Commonly known as: Coreg; Take 1 tablet (6.25   mg) by mouth 2 times daily (morning and late afternoon).   Vitamin B-1 100 mg tablet; Generic drug: thiamine; Take 1 tablet (100   mg) by mouth once daily.     STOP taking these medications     amiodarone 200 mg tablet; Commonly known as: Pacerone   spironolactone 25 mg tablet; Commonly known as: Aldactone       Test Results Pending At Discharge  Pending Labs       No current pending labs.            Hospital Course  See above    Pertinent Physical Exam At Time of Discharge  Physical Exam    Outpatient Follow-Up  No future appointments.      Quinn Odonnell MD

## 2025-01-31 NOTE — PROGRESS NOTES
01/31/25 0911   Discharge Planning   Expected Discharge Disposition Home     No therapy ordered at this time   No skilled needs    NO BARRIERS TO DISCHARGE FROM CARE TRANSITIONS

## 2025-01-31 NOTE — DISCHARGE INSTRUCTIONS
Cut your carvedilol tablets in half and take half a tablet twice a day Saturday and Sunday and then stop taking on Monday.

## 2025-02-05 ENCOUNTER — HOSPITAL ENCOUNTER (OUTPATIENT)
Facility: HOSPITAL | Age: 55
Setting detail: OUTPATIENT SURGERY
Discharge: HOME | End: 2025-02-05
Attending: INTERNAL MEDICINE | Admitting: INTERNAL MEDICINE
Payer: COMMERCIAL

## 2025-02-05 VITALS
BODY MASS INDEX: 22.4 KG/M2 | HEART RATE: 109 BPM | TEMPERATURE: 98 F | SYSTOLIC BLOOD PRESSURE: 156 MMHG | RESPIRATION RATE: 26 BRPM | WEIGHT: 160 LBS | OXYGEN SATURATION: 99 % | DIASTOLIC BLOOD PRESSURE: 102 MMHG | HEIGHT: 71 IN

## 2025-02-05 DIAGNOSIS — I47.10 SVT (SUPRAVENTRICULAR TACHYCARDIA) (CMS-HCC): Primary | ICD-10-CM

## 2025-02-05 PROCEDURE — 99153 MOD SED SAME PHYS/QHP EA: CPT | Performed by: INTERNAL MEDICINE

## 2025-02-05 PROCEDURE — C1760 CLOSURE DEV, VASC: HCPCS | Performed by: INTERNAL MEDICINE

## 2025-02-05 PROCEDURE — 93623 PRGRMD STIMJ&PACG IV RX NFS: CPT | Performed by: INTERNAL MEDICINE

## 2025-02-05 PROCEDURE — C1766 INTRO/SHEATH,STRBLE,NON-PEEL: HCPCS | Performed by: INTERNAL MEDICINE

## 2025-02-05 PROCEDURE — 93653 COMPRE EP EVAL TX SVT: CPT | Performed by: INTERNAL MEDICINE

## 2025-02-05 PROCEDURE — 2500000001 HC RX 250 WO HCPCS SELF ADMINISTERED DRUGS (ALT 637 FOR MEDICARE OP): Performed by: NURSE PRACTITIONER

## 2025-02-05 PROCEDURE — 7100000010 HC PHASE TWO TIME - EACH INCREMENTAL 1 MINUTE: Performed by: INTERNAL MEDICINE

## 2025-02-05 PROCEDURE — 99152 MOD SED SAME PHYS/QHP 5/>YRS: CPT | Performed by: INTERNAL MEDICINE

## 2025-02-05 PROCEDURE — 7100000009 HC PHASE TWO TIME - INITIAL BASE CHARGE: Performed by: INTERNAL MEDICINE

## 2025-02-05 PROCEDURE — 2780000003 HC OR 278 NO HCPCS: Performed by: INTERNAL MEDICINE

## 2025-02-05 PROCEDURE — 2720000007 HC OR 272 NO HCPCS: Performed by: INTERNAL MEDICINE

## 2025-02-05 PROCEDURE — 2500000004 HC RX 250 GENERAL PHARMACY W/ HCPCS (ALT 636 FOR OP/ED): Performed by: NURSE PRACTITIONER

## 2025-02-05 PROCEDURE — C1733 CATH, EP, OTHR THAN COOL-TIP: HCPCS | Performed by: INTERNAL MEDICINE

## 2025-02-05 PROCEDURE — 2500000004 HC RX 250 GENERAL PHARMACY W/ HCPCS (ALT 636 FOR OP/ED): Performed by: INTERNAL MEDICINE

## 2025-02-05 PROCEDURE — C1730 CATH, EP, 19 OR FEW ELECT: HCPCS | Performed by: INTERNAL MEDICINE

## 2025-02-05 RX ORDER — ONDANSETRON HYDROCHLORIDE 2 MG/ML
4 INJECTION, SOLUTION INTRAVENOUS EVERY 8 HOURS PRN
Status: DISCONTINUED | OUTPATIENT
Start: 2025-02-05 | End: 2025-02-05 | Stop reason: HOSPADM

## 2025-02-05 RX ORDER — HYDRALAZINE HYDROCHLORIDE 20 MG/ML
20 INJECTION INTRAMUSCULAR; INTRAVENOUS ONCE
Status: COMPLETED | OUTPATIENT
Start: 2025-02-05 | End: 2025-02-05

## 2025-02-05 RX ORDER — ONDANSETRON 4 MG/1
4 TABLET, FILM COATED ORAL EVERY 8 HOURS PRN
Status: DISCONTINUED | OUTPATIENT
Start: 2025-02-05 | End: 2025-02-05 | Stop reason: HOSPADM

## 2025-02-05 RX ORDER — FENTANYL CITRATE 50 UG/ML
INJECTION, SOLUTION INTRAMUSCULAR; INTRAVENOUS AS NEEDED
Status: DISCONTINUED | OUTPATIENT
Start: 2025-02-05 | End: 2025-02-05 | Stop reason: HOSPADM

## 2025-02-05 RX ORDER — ACETAMINOPHEN 325 MG/1
650 TABLET ORAL EVERY 4 HOURS PRN
Status: DISCONTINUED | OUTPATIENT
Start: 2025-02-05 | End: 2025-02-05 | Stop reason: HOSPADM

## 2025-02-05 RX ORDER — HYDRALAZINE HYDROCHLORIDE 20 MG/ML
10 INJECTION INTRAMUSCULAR; INTRAVENOUS ONCE
Status: COMPLETED | OUTPATIENT
Start: 2025-02-05 | End: 2025-02-05

## 2025-02-05 RX ORDER — CARVEDILOL 6.25 MG/1
6.25 TABLET ORAL ONCE
Status: COMPLETED | OUTPATIENT
Start: 2025-02-05 | End: 2025-02-05

## 2025-02-05 RX ORDER — BUPIVACAINE HYDROCHLORIDE 2.5 MG/ML
INJECTION, SOLUTION EPIDURAL; INFILTRATION; INTRACAUDAL AS NEEDED
Status: DISCONTINUED | OUTPATIENT
Start: 2025-02-05 | End: 2025-02-05 | Stop reason: HOSPADM

## 2025-02-05 RX ORDER — MIDAZOLAM HYDROCHLORIDE 1 MG/ML
INJECTION, SOLUTION INTRAMUSCULAR; INTRAVENOUS AS NEEDED
Status: DISCONTINUED | OUTPATIENT
Start: 2025-02-05 | End: 2025-02-05 | Stop reason: HOSPADM

## 2025-02-05 RX ADMIN — HYDRALAZINE HYDROCHLORIDE 20 MG: 20 INJECTION INTRAMUSCULAR; INTRAVENOUS at 16:26

## 2025-02-05 RX ADMIN — HYDRALAZINE HYDROCHLORIDE 10 MG: 20 INJECTION INTRAMUSCULAR; INTRAVENOUS at 14:54

## 2025-02-05 RX ADMIN — CARVEDILOL 6.25 MG: 6.25 TABLET, FILM COATED ORAL at 15:35

## 2025-02-05 RX ADMIN — HYDROCHLOROTHIAZIDE: 12.5 CAPSULE ORAL at 15:35

## 2025-02-05 ASSESSMENT — PAIN SCALES - GENERAL: PAINLEVEL_OUTOF10: 0 - NO PAIN

## 2025-02-05 ASSESSMENT — PAIN - FUNCTIONAL ASSESSMENT: PAIN_FUNCTIONAL_ASSESSMENT: 0-10

## 2025-02-05 NOTE — POST-PROCEDURE NOTE
Physician Transition of Care Summary  Invasive Cardiovascular Lab    Procedure Date: 2/5/2025  Attending:    * Philip Williamson - Primary  Resident/Fellow/Other Assistant: Surgeons and Role:  * No surgeons found with a matching role *    Indications:   Pre-op Diagnosis      * SVT (supraventricular tachycardia) (CMS-HCC) [I47.10]    Post-procedure diagnosis:   Post-op Diagnosis     * SVT (supraventricular tachycardia) (CMS-HCC) [I47.10]    Procedure(s):   EP Study  65366 - [place] COMPRE EP EVAL R ATR VNTRC PACG&REC HIS BNDL REC    Ablation SVT  56593 - [place] COMPRE EP EVAL ABLTJ 3D MAPG TX SVT        Procedure Findings:   See below    Description of the Procedure:   After written witnessed informed consent was obtained the procedure from the patient, the patient was transferred to the EP laboratory. The patient was in the fasting state. A grounding pad was placed. The patient was set up for monitoring of surface ECG and intracardiac electrograms. Patches for three dimensional mapping were applied to the chest and back. The right and left groins were prepped and draped in the usual sterile fashion. Bupivacaine was administered locally for anesthetic.    Right and Left Femoral Vein access was obtained. The vessel was accessed using the modified Seldinger technique. The catheters were positioned as follows:    Left Femoral Vein  - 6 Fr [employer] curry D - type octopolar - for HIS recording  Right Femoral Vein - 6 Fr Coronary sinus EPXT octopolar catheter  Right Femoral Vein - 5 Fr Quad Chanda for the HRA  Right Femoral Vein - 5 Fr Quad Chanda for the RV base  Right Femoral Vein - SJM Sapphire non irrigated 7 [place] ablation catheter via a long Agilis steerable sheath    Baseline intracardiac intervals were normal    Arrhythmias included and treatment:  Right ventricular pacing from the RV base demonstrated intact VA conduction that was concentric. VA Wenckebach was 360 ms at baseline. The AV Wenckebach was 340 ms at  baseline. Programmed electrical stimulation from the proximal coronary sinus electrogram demonstrated the presence of dual AV lidia physiology with a 100 ms AH jump after a 10 ms decrement in the S2 extra stimulus. A narrow QRS complex tachycardia was induced with a VA time of 0 ms, and a CL of 400 ms. 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 Three dimensional mapping ([employer] precision system) was utilized to navigate and confirm anatomy. A HIS cloud was created. A 7 Fr non-irrigated 4 mm tip ablation catheter was utilized to modify the slow pathway. Ablation lesions were applied at 50 navarro for a temperature of 60 degrees celsius for  ablation. The patient had junctional tachycardia during ablation with intact VA conduction. Post ablation, the patient continued to have VA conduction and normal anterograde conduction. No other arrhythmias were inducible 30 minutes post ablation despite pharmacological provocation with isoproterenol.     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      Complications:   None    Stents/Implants:   Implants       No implant documentation for this case.            Anticoagulation/Antiplatelet Plan:   None    Estimated Blood Loss:   20 mL    Anesthesia: Moderate Sedation Anesthesia Staff: No anesthesia staff entered.    Any Specimen(s) Removed:   No specimens collected during this procedure.    Disposition:   Stable      Electronically signed by: Philip Williamson MD, 2/5/2025 3:08 PM

## 2025-02-05 NOTE — NURSING NOTE
Per patient Dr. Kenny office instructed him to hold all blood pressure medicines. Patient has been holding meds since Monday 2/3/25. Pt in pre area was hypertensive 180-190's/100-110's. NP notified, no changes made at this time. RN to RN report given to cath lab nurse regarding BP and medication holds. Pt arrived back from procedure HTN, per eMAR and procedural nurse, BP not treated in procedure. Pt blood pressure 180-190's/110's-120's. 10mg Hydralazine ordered per NP. Pt remains hypertensive, orders placed to give patient home blood pressure medications. Home meds given, pt remains hypertensive afterwards. Teri notified of HTN and treatments thus far, per Dr. Williamson 20mg hydralazine ordered. Patient received hydralazine, blood pressures remaining at 156/102. Reviewing chart, in September (9/10/24) patient was 144/103 and this past month 1/16/25 the blood pressure was 154/102. Spoke to Dr. Williamson via phone, he is okay with discharging pt home with current blood pressure of 156/102. Instructed pt to tae carvedilol pm dose this evening per Dr. Kenny requests.

## 2025-02-05 NOTE — NURSING NOTE
END OF PROCEDURE MONITORING CRITERIA  01. BP is within +/- 20% of preprocedure  02. Oxygen sat is at 92% or above on RA, or existing order for O2 treatment, or at pre-sedation levels, otherwise new O2 order needed.  03. Unless the patient has a pre-procedure history of diminished level of consciousness, s/he is easily arousable and when aroused is able to responds appropriately for his/her age.  04. Significant complications related to the specific procedure are absent, have been controlled, or have been evaluated, including:      A. Pain.      B. Wound drainage.      C. All drains and tubes are patent.      D. Nausea and Vomiting. Vomiting is not persisten and has not occurred within 15 minutes prior to discharge.      E. Bladder distention. Voided bladder and/or no symptoms or urinary retention (e.g., bladder distention, frequent voiding in small amounts).      F. Neurovascular status.      G. Level of Consciousness consistent with pre procedural status.        children  affirmed that they were driving the patient home.

## 2025-02-05 NOTE — DISCHARGE INSTRUCTIONS
You received sedation today, please follow these guidelines:  FOR NEXT 24 HOURS  - Upon discharge, you should return home and rest for the remainder of the day and evening. You do not have to stay on bed rest but should not be very active.  It is recommended a responsible adult be with you for the first 24 hours after the procedure.    -Please resume your blood thinning medication the evening of your procedure.      - No driving for 24 hours after procedure.     - Do not drive, operate machinery, or use power tools for 24 hours after your procedure.     - Do not make any legal decisions for 24 hours after your procedure.     - Do not drink alcoholic beverages for 24 hours after your procedure.     WOUND CARE    ·      Avoid heavy lifting (over 10 pounds) for 7 days, squatting or excessive bending for 2 days, and strenuous exercise for 7 days.  ·      No submerged bathing, swimming, or hot tubs for the next 7 days, or until fully healed.  ·      Avoid sexual activity for 3-4 days until any groin discomfort has ceased.  - The transparent dressing should be removed from the site 24 hours after the procedure.  Wash the site gently with soap and water. Rinse well and pat dry. Keep the area clean and dry. You may apply a Band-Aid to the site. Avoid lotions, ointments, or powders until fully healed.     - You may shower the day after your procedure.      - It is normal to notice a small bruise around the puncture site and/or a small grape sized or smaller lump. Any large bruising or large lump warrants a call to the office.      - If bleeding should occur, lay down and apply pressure to the affected area for 10 minutes.  If the bleeding stops notify your physician.  If there is a large amount of bleeding or spurting of blood CALL 911 immediately.  DO NOT drive yourself to the hospital.     - You may experience some tenderness, bruising or minimal inflammation.  If you have any concerns, you may contact the Cath Lab or if  any of these symptoms become excessive, contact your cardiologist or go to the emergency room.      OTHER INSTRUCTIONS  - You may take acetaminophen (Tylenol) as directed for discomfort.  If pain is not relieved with acetaminophen (Tylenol), contact your doctor.     - If you notice or experience any of the following, you should notify your doctor or seek medical attention  · Chest pain or discomfort  · Change in mental status or weakness in extremities.  · Dizziness, light headedness, or feeling faint.  · Change in the site where the procedure was performed, such as bleeding or an increased area of bruising or swelling.  · Tingling, numbness, pain, or coolness in the leg/arm beyond the site where the procedure was performed.  · Signs of infection (i.e. shaking chills, temperature > 100 degrees Fahrenheit, warmth, redness) in the leg/arm area where the procedure was performed.  · Changes in urination   · Bloody or black stools  · Vomiting blood  · Severe nose bleeds  · Any excessive bleeding

## 2025-02-05 NOTE — LETTER
February 5, 2025     Patient: Los Rubio   YOB: 1970   Date of Visit: 2/5/2025       To Whom It May Concern:    Los Rubio was seen in my clinic on 2/5/2025. Please excuse Los for his absence from work on this day to make the appointment.    Los is to have the following work restrictions:   Avoid heavy lifting (over 10 pounds) for 7 days, squatting or excessive bending for 2 days, and strenuous exercise for 7 days.     If you have any questions or concerns, please don't hesitate to call.         Sincerely,         Radha James, CNP  [phone]        CC: No Recipients

## 2025-02-06 ASSESSMENT — PAIN SCALES - GENERAL: PAINLEVEL_OUTOF10: 0 - NO PAIN

## 2025-02-18 PROCEDURE — RXMED WILLOW AMBULATORY MEDICATION CHARGE

## 2025-02-20 ENCOUNTER — PHARMACY VISIT (OUTPATIENT)
Dept: PHARMACY | Facility: CLINIC | Age: 55
End: 2025-02-20
Payer: COMMERCIAL

## 2025-03-20 PROCEDURE — RXMED WILLOW AMBULATORY MEDICATION CHARGE

## 2025-03-22 ENCOUNTER — PHARMACY VISIT (OUTPATIENT)
Dept: PHARMACY | Facility: CLINIC | Age: 55
End: 2025-03-22
Payer: COMMERCIAL

## 2025-04-21 DIAGNOSIS — I47.10 SVT (SUPRAVENTRICULAR TACHYCARDIA) (CMS-HCC): ICD-10-CM

## 2025-04-22 RX ORDER — LANOLIN ALCOHOL/MO/W.PET/CERES
100 CREAM (GRAM) TOPICAL DAILY
Qty: 90 TABLET | Refills: 0 | OUTPATIENT
Start: 2025-04-22

## 2025-04-22 RX ORDER — CARVEDILOL 6.25 MG/1
6.25 TABLET ORAL
Qty: 180 TABLET | Refills: 0 | OUTPATIENT
Start: 2025-04-22

## 2025-04-24 DIAGNOSIS — I47.10 SVT (SUPRAVENTRICULAR TACHYCARDIA) (CMS-HCC): ICD-10-CM

## 2025-04-24 PROCEDURE — RXMED WILLOW AMBULATORY MEDICATION CHARGE

## 2025-04-24 RX ORDER — LANOLIN ALCOHOL/MO/W.PET/CERES
100 CREAM (GRAM) TOPICAL DAILY
Qty: 90 TABLET | Refills: 0 | Status: SHIPPED | OUTPATIENT
Start: 2025-04-24

## 2025-04-24 RX ORDER — CARVEDILOL 6.25 MG/1
6.25 TABLET ORAL
Qty: 180 TABLET | Refills: 0 | Status: SHIPPED | OUTPATIENT
Start: 2025-04-24

## 2025-04-25 ENCOUNTER — PHARMACY VISIT (OUTPATIENT)
Dept: PHARMACY | Facility: CLINIC | Age: 55
End: 2025-04-25
Payer: COMMERCIAL

## 2025-04-25 PROCEDURE — RXMED WILLOW AMBULATORY MEDICATION CHARGE

## 2025-05-07 NOTE — PROGRESS NOTES
No chief complaint on file.       The patient is a 54-year-old male with a history of a cardiomyopathy and SVT post catheter-based therapy last February.  He is seeing me today in follow-up.  Well from the rhythm perspective post his ablation with no recurrences whatsoever.  He has no history of atrial fibrillation.  He is in need of a primary care physician and notes that he has been having quite a bit of trouble with asthma flares.           Active Ambulatory Problems     Diagnosis Date Noted    SVT (supraventricular tachycardia) (CMS-HCC) 01/16/2025    Chronic systolic congestive heart failure 01/16/2025    Alcohol abuse 01/16/2025    Typical atrial flutter (Multi) 05/08/2025     Resolved Ambulatory Problems     Diagnosis Date Noted    SVT (supraventricular tachycardia) (CMS-HCC) 05/21/2024    Dyspnea, unspecified type 01/30/2025     Past Medical History:   Diagnosis Date    Hypertension         Review of Systems   Constitutional: Negative.   HENT: Negative.     Eyes: Negative.    Cardiovascular: Negative.    Respiratory:  Positive for shortness of breath and wheezing.    Endocrine: Negative.    Skin: Negative.    Musculoskeletal: Negative.    Gastrointestinal: Negative.    Genitourinary: Negative.    Neurological: Negative.    Psychiatric/Behavioral: Negative.            Current Outpatient Medications   Medication Instructions    carvedilol (COREG) 6.25 mg, oral, 2 times daily (morning and late afternoon)    fluticasone propion-salmeteroL (Advair Diskus) 500-50 mcg/dose diskus inhaler 1 puff, inhalation, 2 times daily RT, Rinse mouth with water after use to reduce aftertaste and incidence of candidiasis. Do not swallow.    losartan-hydrochlorothiazide (Hyzaar) 50-12.5 mg tablet 1 tablet, oral, Daily    thiamine (VITAMIN B-1) 100 mg, oral, Daily       Objective     There were no vitals filed for this visit.     Vitals and nursing note reviewed.   Constitutional:       Appearance: Healthy appearance.   HENT:     Mouth/Throat:      Pharynx: Oropharynx is clear.   Pulmonary:      Effort: Pulmonary effort is normal.      Breath sounds: Decreased air movement present. Wheezing present.   Cardiovascular:      PMI at left midclavicular line. Normal rate. Regular rhythm. Normal S1. Normal S2.       Murmurs: There is a grade 1/6 holosystolic murmur.      No gallop.  No click. No rub.   Pulses:     Intact distal pulses.   Edema:     Peripheral edema absent.   Abdominal:      General: Bowel sounds are normal.   Musculoskeletal:      Cervical back: Normal range of motion. Skin:     General: Skin is warm and dry.   Neurological:      General: No focal deficit present.      Mental Status: Alert and oriented to person, place and time.            Lab Review:   No visits with results within 2 Month(s) from this visit.   Latest known visit with results is:   Admission on 01/30/2025, Discharged on 01/31/2025   Component Date Value    Ventricular Rate 01/30/2025 156     QRS Duration 01/30/2025 76     QT Interval 01/30/2025 298     QTC Calculation(Bazett) 01/30/2025 480     R Axis 01/30/2025 -2     T Axis 01/30/2025 62     QRS Count 01/30/2025 26     Q Onset 01/30/2025 230     T Offset 01/30/2025 379     QTC Fredericia 01/30/2025 409     Thyroid Stimulating Horm* 01/30/2025 1.75     WBC 01/30/2025 15.7 (H)     nRBC 01/30/2025 0.0     RBC 01/30/2025 5.39     Hemoglobin 01/30/2025 14.5     Hematocrit 01/30/2025 42.3     MCV 01/30/2025 79 (L)     MCH 01/30/2025 26.9     MCHC 01/30/2025 34.3     RDW 01/30/2025 17.6 (H)     Platelets 01/30/2025 488 (H)     Neutrophils % 01/30/2025 69.7     Immature Granulocytes %,* 01/30/2025 0.3     Lymphocytes % 01/30/2025 22.6     Monocytes % 01/30/2025 5.4     Eosinophils % 01/30/2025 1.1     Basophils % 01/30/2025 0.9     Neutrophils Absolute 01/30/2025 10.92 (H)     Immature Granulocytes Ab* 01/30/2025 0.05     Lymphocytes Absolute 01/30/2025 3.54     Monocytes Absolute 01/30/2025 0.85     Eosinophils  Absolute 01/30/2025 0.18     Basophils Absolute 01/30/2025 0.14 (H)     Glucose 01/30/2025 165 (H)     Sodium 01/30/2025 137     Potassium 01/30/2025 4.3     Chloride 01/30/2025 105     Bicarbonate 01/30/2025 24     Anion Gap 01/30/2025 12     Urea Nitrogen 01/30/2025 19     Creatinine 01/30/2025 0.99     eGFR 01/30/2025 >90     Calcium 01/30/2025 8.9     Albumin 01/30/2025 4.3     Alkaline Phosphatase 01/30/2025 64     Total Protein 01/30/2025 6.7     AST 01/30/2025 21     Bilirubin, Total 01/30/2025 0.5     ALT 01/30/2025 21     Lipase 01/30/2025 17     BNP 01/30/2025 170 (H)     Flu A Result 01/30/2025 Not Detected     Flu B Result 01/30/2025 Not Detected     Coronavirus 2019, PCR 01/30/2025 Not Detected     Color, Urine 01/30/2025 Light-Yellow     Appearance, Urine 01/30/2025 Clear     Specific Gravity, Urine 01/30/2025 1.036 (N)     pH, Urine 01/30/2025 6.0     Protein, Urine 01/30/2025 NEGATIVE     Glucose, Urine 01/30/2025 Normal     Blood, Urine 01/30/2025 NEGATIVE     Ketones, Urine 01/30/2025 NEGATIVE     Bilirubin, Urine 01/30/2025 NEGATIVE     Urobilinogen, Urine 01/30/2025 Normal     Nitrite, Urine 01/30/2025 NEGATIVE     Leukocyte Esterase, Urine 01/30/2025 NEGATIVE     Extra Tube 01/30/2025 Hold for add-ons.     Troponin I, High Sensiti* 01/30/2025 7     Lactate 01/30/2025 2.1 (H)     Troponin I, High Sensiti* 01/30/2025 8     Lactate 01/30/2025 1.2     WBC 01/31/2025 12.6 (H)     nRBC 01/31/2025 0.0     RBC 01/31/2025 4.80     Hemoglobin 01/31/2025 12.7 (L)     Hematocrit 01/31/2025 37.7 (L)     MCV 01/31/2025 79 (L)     MCH 01/31/2025 26.5     MCHC 01/31/2025 33.7     RDW 01/31/2025 17.2 (H)     Platelets 01/31/2025 408     Glucose 01/31/2025 95     Sodium 01/31/2025 136     Potassium 01/31/2025 3.9     Chloride 01/31/2025 104     Bicarbonate 01/31/2025 25     Anion Gap 01/31/2025 11     Urea Nitrogen 01/31/2025 19     Creatinine 01/31/2025 0.94     eGFR 01/31/2025 >90     Calcium 01/31/2025 8.3 (L)         ECG:  Normal sinus rhythm at 98 bpm IL interval 124 ms QRS duration 80 ms QTc 480 ms nonspecific ST segment changes    Assessment/plan:  History as above.  No recurrent arrhythmias.  He should only follow with us as needed if he has a recurrence of arrhythmia.  I will refer him for primary care physician and renew his medications in the meantime and sent him with a prednisone taper given his asthma flare.    Problem List Items Addressed This Visit       SVT (supraventricular tachycardia) (CMS-[facility]) - Primary    Relevant Orders    ECG 12 lead (Clinic Performed) (Completed)      Philip Williamson MD

## 2025-05-08 ENCOUNTER — OFFICE VISIT (OUTPATIENT)
Dept: URGENT CARE | Age: 55
End: 2025-05-08
Payer: COMMERCIAL

## 2025-05-08 ENCOUNTER — OFFICE VISIT (OUTPATIENT)
Facility: CLINIC | Age: 55
End: 2025-05-08
Payer: COMMERCIAL

## 2025-05-08 VITALS
DIASTOLIC BLOOD PRESSURE: 72 MMHG | HEART RATE: 69 BPM | TEMPERATURE: 97.5 F | WEIGHT: 160 LBS | HEIGHT: 71 IN | SYSTOLIC BLOOD PRESSURE: 113 MMHG | OXYGEN SATURATION: 96 % | BODY MASS INDEX: 22.4 KG/M2

## 2025-05-08 DIAGNOSIS — I47.10 SVT (SUPRAVENTRICULAR TACHYCARDIA) (CMS-HCC): Primary | ICD-10-CM

## 2025-05-08 DIAGNOSIS — J45.909 ASTHMA, UNSPECIFIED ASTHMA SEVERITY, UNSPECIFIED WHETHER COMPLICATED, UNSPECIFIED WHETHER PERSISTENT (HHS-HCC): ICD-10-CM

## 2025-05-08 DIAGNOSIS — J45.21 MILD INTERMITTENT ASTHMA WITH EXACERBATION (HHS-HCC): Primary | ICD-10-CM

## 2025-05-08 PROBLEM — I48.3 TYPICAL ATRIAL FLUTTER (MULTI): Status: ACTIVE | Noted: 2025-05-08

## 2025-05-08 PROCEDURE — 93005 ELECTROCARDIOGRAM TRACING: CPT | Performed by: INTERNAL MEDICINE

## 2025-05-08 PROCEDURE — 99213 OFFICE O/P EST LOW 20 MIN: CPT | Performed by: INTERNAL MEDICINE

## 2025-05-08 PROCEDURE — 99213 OFFICE O/P EST LOW 20 MIN: CPT | Mod: 25 | Performed by: INTERNAL MEDICINE

## 2025-05-08 PROCEDURE — 93010 ELECTROCARDIOGRAM REPORT: CPT | Performed by: INTERNAL MEDICINE

## 2025-05-08 RX ORDER — IPRATROPIUM BROMIDE AND ALBUTEROL SULFATE 2.5; .5 MG/3ML; MG/3ML
3 SOLUTION RESPIRATORY (INHALATION) ONCE
Status: COMPLETED | OUTPATIENT
Start: 2025-05-08 | End: 2025-05-08

## 2025-05-08 RX ORDER — METHYLPREDNISOLONE SODIUM SUCCINATE 125 MG/2ML
125 INJECTION INTRAMUSCULAR; INTRAVENOUS ONCE
Status: COMPLETED | OUTPATIENT
Start: 2025-05-08 | End: 2025-05-08

## 2025-05-08 RX ORDER — LANOLIN ALCOHOL/MO/W.PET/CERES
100 CREAM (GRAM) TOPICAL DAILY
Qty: 90 TABLET | Refills: 3 | Status: SHIPPED | OUTPATIENT
Start: 2025-05-08

## 2025-05-08 RX ORDER — CARVEDILOL 6.25 MG/1
6.25 TABLET ORAL
Qty: 180 TABLET | Refills: 3 | Status: SHIPPED | OUTPATIENT
Start: 2025-05-08

## 2025-05-08 RX ORDER — PREDNISONE 20 MG/1
TABLET ORAL
Qty: 30 TABLET | Refills: 0 | Status: SHIPPED | OUTPATIENT
Start: 2025-05-08 | End: 2025-05-08

## 2025-05-08 RX ORDER — PREDNISONE 20 MG/1
TABLET ORAL
Qty: 20 TABLET | Refills: 0 | Status: SHIPPED | OUTPATIENT
Start: 2025-05-09 | End: 2025-05-21

## 2025-05-08 RX ORDER — LOSARTAN POTASSIUM AND HYDROCHLOROTHIAZIDE 12.5; 5 MG/1; MG/1
1 TABLET ORAL DAILY
Qty: 90 TABLET | Refills: 3 | Status: SHIPPED | OUTPATIENT
Start: 2025-05-08

## 2025-05-08 RX ORDER — ALBUTEROL SULFATE 90 UG/1
2 INHALANT RESPIRATORY (INHALATION) EVERY 6 HOURS PRN
Qty: 18 G | Refills: 0 | Status: SHIPPED | OUTPATIENT
Start: 2025-05-08 | End: 2026-05-08

## 2025-05-08 RX ADMIN — IPRATROPIUM BROMIDE AND ALBUTEROL SULFATE 3 ML: 2.5; .5 SOLUTION RESPIRATORY (INHALATION) at 12:30

## 2025-05-08 RX ADMIN — METHYLPREDNISOLONE SODIUM SUCCINATE 125 MG: 125 INJECTION INTRAMUSCULAR; INTRAVENOUS at 12:29

## 2025-05-08 ASSESSMENT — ENCOUNTER SYMPTOMS
PSYCHIATRIC NEGATIVE: 1
SHORTNESS OF BREATH: 1
OCCASIONAL FEELINGS OF UNSTEADINESS: 0
CARDIOVASCULAR NEGATIVE: 1
EYES NEGATIVE: 1
LOSS OF SENSATION IN FEET: 0
GASTROINTESTINAL NEGATIVE: 1
SHORTNESS OF BREATH: 1
WHEEZING: 1
ENDOCRINE NEGATIVE: 1
WHEEZING: 1
DEPRESSION: 0
MUSCULOSKELETAL NEGATIVE: 1
CONSTITUTIONAL NEGATIVE: 1
NEUROLOGICAL NEGATIVE: 1

## 2025-05-08 ASSESSMENT — COLUMBIA-SUICIDE SEVERITY RATING SCALE - C-SSRS
2. HAVE YOU ACTUALLY HAD ANY THOUGHTS OF KILLING YOURSELF?: NO
6. HAVE YOU EVER DONE ANYTHING, STARTED TO DO ANYTHING, OR PREPARED TO DO ANYTHING TO END YOUR LIFE?: NO
1. IN THE PAST MONTH, HAVE YOU WISHED YOU WERE DEAD OR WISHED YOU COULD GO TO SLEEP AND NOT WAKE UP?: NO

## 2025-05-08 ASSESSMENT — PATIENT HEALTH QUESTIONNAIRE - PHQ9
1. LITTLE INTEREST OR PLEASURE IN DOING THINGS: NOT AT ALL
2. FEELING DOWN, DEPRESSED OR HOPELESS: NOT AT ALL
SUM OF ALL RESPONSES TO PHQ9 QUESTIONS 1 AND 2: 0

## 2025-05-08 NOTE — ASSESSMENT & PLAN NOTE
History as above.  No recurrent arrhythmias.  He should only follow with us as needed if he has a recurrence of arrhythmia.  I will refer him for primary care physician and renew his medications in the meantime and sent him with a prednisone taper given his asthma flare.

## 2025-05-08 NOTE — PROGRESS NOTES
"Subjective   Patient ID: Los Rubio is a 54 y.o. male. They present today with a chief complaint of Asthma (Asthma attack started last night around 3 am/).    History of Present Illness  Los Rubio is a 54 y.o. male who presents to the clinic for 1 day of asthma exacerbation.  Patient states that he has a history of asthma.  He states he is a smoker.  Patient denies any history of vaping.  Patient states he developed chest tightness/shortness of breath last evening, Around 3 AM.  He states he has taken his rescue inhaler and nebulizer with little relief.  Pt denies any chest pain, sob, N/V at this time in clinic.             Past Medical History  Allergies as of 05/08/2025 - Reviewed 05/08/2025   Allergen Reaction Noted    Shellfish derived Anaphylaxis 05/21/2024       Prescriptions Prior to Admission[1]     Medical History[2]    Surgical History[3]     reports that he has been smoking cigarettes. He started smoking about 35 years ago. He has a 70.7 pack-year smoking history. He has quit using smokeless tobacco. He reports current drug use. Drug: Marijuana.    Review of Systems  Review of Systems   Respiratory:  Positive for shortness of breath and wheezing.    All other systems reviewed and are negative.                                 Objective    Vitals:    05/08/25 1206   BP: 113/72   Pulse: 69   Temp: 36.4 °C (97.5 °F)   SpO2: 96%   Weight: 72.6 kg (160 lb)   Height: 1.803 m (5' 11\")     No LMP for male patient.    Physical Exam  Constitutional:       Appearance: Normal appearance.   HENT:      Right Ear: Tympanic membrane normal.      Left Ear: Tympanic membrane normal.   Cardiovascular:      Rate and Rhythm: Normal rate and regular rhythm.   Pulmonary:      Breath sounds: Wheezing present.      Comments: Expiratory, all lung fields  Neurological:      General: No focal deficit present.      Mental Status: He is alert and oriented to person, place, and time. Mental status is at baseline. "   Psychiatric:         Behavior: Behavior normal.         Procedures    Point of Care Test & Imaging Results from this visit  No results found for this visit on 05/08/25.   Imaging  No results found.    Cardiology, Vascular, and Other Imaging  ECG 12 lead (Clinic Performed)  Result Date: 5/8/2025  Normal sinus rhythm at 98 bpm LA interval 124 ms QRS duration 80 ms QTc 480 ms nonspecific ST segment changes        Diagnostic study results (if any) were reviewed by ABDELRAHMAN Cavanaugh.    Assessment/Plan   Allergies, medications, history, and pertinent labs/EKGs/Imaging reviewed by ABDELRAHMAN Cavanaugh.     Medical Decision Making  Signs and symptoms consistent with acute asthma exacerbation. No evidence of significant respiratory distress or failure. Steroids given and advise continued use of albuterol prn. Advise patient to go to ED if symptoms change or worsen.    - Post breathing treatment-patient able to speak in full sentences.  Lungs clear to auscultation bilateral.  Orders and Diagnoses  Diagnoses and all orders for this visit:  Mild intermittent asthma with exacerbation ([facility]-HCC)  -     ipratropium-albuteroL (Duo-Neb) 0.5-2.5 mg/3 mL nebulizer solution 3 mL  -     methylPREDNISolone sodium succinate (PF) (SOLU-Medrol) injection 125 mg  -     predniSONE (Deltasone) 20 mg tablet; Take 3 tablets (60 mg) by mouth once daily for 3 days, THEN 2 tablets (40 mg) once daily for 3 days, THEN 1 tablet (20 mg) once daily for 3 days, THEN 0.5 tablets (10 mg) once daily for 3 days. Do not fill before May 9, 2025.  -     albuterol 90 mcg/actuation inhaler; Inhale 2 puffs every 6 hours if needed for wheezing.  Asthma, unspecified asthma severity, unspecified whether complicated, unspecified whether persistent ([facility])  -     predniSONE (Deltasone) 20 mg tablet; Take 3 tablets (60 mg) by mouth once daily for 3 days, THEN 2 tablets (40 mg) once daily for 3 days, THEN 1 tablet (20 mg) once daily for 3 days, THEN 0.5  tablets (10 mg) once daily for 3 days. Do not fill before May 9, 2025.  -     albuterol 90 mcg/actuation inhaler; Inhale 2 puffs every 6 hours if needed for wheezing.      Medical Admin Record  Administrations This Visit       ipratropium-albuteroL (Duo-Neb) 0.5-2.5 mg/3 mL nebulizer solution 3 mL       Admin Date  05/08/2025 Action  Given Dose  3 mL Route  nebulization Documented By  Satya Saleh MA              methylPREDNISolone sodium succinate (PF) (SOLU-Medrol) injection 125 mg       Admin Date  05/08/2025 Action  Given Dose  125 mg Route  intramuscular Documented By  Satya Saleh MA                    Patient disposition: Home    Electronically signed by MAEGAN Cavanaugh-JUDY  12:55 PM           [1] (Not in a hospital admission)   [2]   Past Medical History:  Diagnosis Date    Hypertension    [3]   Past Surgical History:  Procedure Laterality Date    CARDIAC ELECTROPHYSIOLOGY PROCEDURE N/A 2/5/2025    Procedure: EP Study;  Surgeon: Philip Williamson MD;  Location: [facility] Cardiac Cath Lab;  Service: Electrophysiology;  Laterality: N/A;  EP study with SVT ablation CPT [id number]; ICD I47.1    CARDIAC ELECTROPHYSIOLOGY PROCEDURE N/A 2/5/2025    Procedure: Ablation SVT;  Surgeon: Philip Williamson MD;  Location: [facility] Cardiac Cath Lab;  Service: Electrophysiology;  Laterality: N/A;

## 2025-05-08 NOTE — PATIENT INSTRUCTIONS
Prednisone taper please start tomorrow.  Albuterol inhaler please take as needed for shortness of breath wheezing.  Please follow with your primary care doctor next 5 to 7 days.  If worsening symptoms, please go to local emergency department for further evaluation    Please follow up with your primary provider within one week if symptoms do not improve.  You may schedule an appointment online at [email or web address] or call [phone]. Go to the Emergency Department if symptoms significantly worsen or if you develop chest pain or shortness of breath.

## 2025-05-13 DIAGNOSIS — I47.10 SVT (SUPRAVENTRICULAR TACHYCARDIA): ICD-10-CM

## 2025-05-13 RX ORDER — CARVEDILOL 6.25 MG/1
6.25 TABLET ORAL
Qty: 180 TABLET | Refills: 0 | Status: SHIPPED | OUTPATIENT
Start: 2025-05-13

## 2025-05-14 PROBLEM — I10 ESSENTIAL HYPERTENSION: Status: ACTIVE | Noted: 2022-06-29

## 2025-05-14 PROBLEM — J45.909 ASTHMA WITHOUT STATUS ASTHMATICUS (HHS-HCC): Status: ACTIVE | Noted: 2025-05-14

## 2025-05-14 PROBLEM — I42.9 CARDIOMYOPATHY: Status: ACTIVE | Noted: 2024-11-12

## 2025-05-27 ENCOUNTER — TELEPHONE (OUTPATIENT)
Facility: CLINIC | Age: 55
End: 2025-05-27
Payer: COMMERCIAL

## 2025-05-27 NOTE — TELEPHONE ENCOUNTER
VM received In regard to medication requests-advised medication sent over on 5/8 and to check again with pharmacy. Advised will reach out to Dr. Williamson for alt PCP referral as one provided is no longer accepting new patients.

## 2025-06-02 ENCOUNTER — APPOINTMENT (OUTPATIENT)
Dept: PRIMARY CARE | Facility: CLINIC | Age: 55
End: 2025-06-02
Payer: COMMERCIAL

## 2025-06-02 ENCOUNTER — HOSPITAL ENCOUNTER (EMERGENCY)
Facility: HOSPITAL | Age: 55
Discharge: HOME | End: 2025-06-02
Payer: COMMERCIAL

## 2025-06-02 VITALS
BODY MASS INDEX: 22.4 KG/M2 | OXYGEN SATURATION: 100 % | HEIGHT: 71 IN | RESPIRATION RATE: 16 BRPM | DIASTOLIC BLOOD PRESSURE: 94 MMHG | TEMPERATURE: 97.9 F | SYSTOLIC BLOOD PRESSURE: 136 MMHG | HEART RATE: 79 BPM | WEIGHT: 160 LBS

## 2025-06-02 DIAGNOSIS — M54.41 ACUTE MIDLINE LOW BACK PAIN WITH RIGHT-SIDED SCIATICA: Primary | ICD-10-CM

## 2025-06-02 PROCEDURE — 99284 EMERGENCY DEPT VISIT MOD MDM: CPT

## 2025-06-02 PROCEDURE — 96372 THER/PROPH/DIAG INJ SC/IM: CPT

## 2025-06-02 PROCEDURE — 2500000004 HC RX 250 GENERAL PHARMACY W/ HCPCS (ALT 636 FOR OP/ED)

## 2025-06-02 RX ORDER — ORPHENADRINE CITRATE 30 MG/ML
60 INJECTION INTRAMUSCULAR; INTRAVENOUS ONCE
Status: COMPLETED | OUTPATIENT
Start: 2025-06-02 | End: 2025-06-02

## 2025-06-02 RX ORDER — PREDNISONE 20 MG/1
40 TABLET ORAL DAILY
Qty: 10 TABLET | Refills: 0 | Status: SHIPPED | OUTPATIENT
Start: 2025-06-02 | End: 2025-06-07

## 2025-06-02 RX ORDER — PREDNISONE 20 MG/1
40 TABLET ORAL ONCE
Status: COMPLETED | OUTPATIENT
Start: 2025-06-02 | End: 2025-06-02

## 2025-06-02 RX ORDER — KETOROLAC TROMETHAMINE 15 MG/ML
15 INJECTION, SOLUTION INTRAMUSCULAR; INTRAVENOUS ONCE
Status: COMPLETED | OUTPATIENT
Start: 2025-06-02 | End: 2025-06-02

## 2025-06-02 RX ORDER — METHOCARBAMOL 500 MG/1
500 TABLET, FILM COATED ORAL 2 TIMES DAILY
Qty: 14 TABLET | Refills: 0 | Status: SHIPPED | OUTPATIENT
Start: 2025-06-02 | End: 2025-06-09

## 2025-06-02 RX ORDER — LIDOCAINE 50 MG/G
1 PATCH TOPICAL DAILY PRN
Qty: 10 PATCH | Refills: 0 | Status: SHIPPED | OUTPATIENT
Start: 2025-06-02 | End: 2025-06-12

## 2025-06-02 RX ADMIN — KETOROLAC TROMETHAMINE 15 MG: 15 INJECTION, SOLUTION INTRAMUSCULAR; INTRAVENOUS at 13:29

## 2025-06-02 RX ADMIN — PREDNISONE 40 MG: 20 TABLET ORAL at 13:29

## 2025-06-02 RX ADMIN — ORPHENADRINE CITRATE 60 MG: 60 INJECTION INTRAMUSCULAR; INTRAVENOUS at 13:29

## 2025-06-02 ASSESSMENT — PAIN SCALES - GENERAL
PAINLEVEL_OUTOF10: 3
PAINLEVEL_OUTOF10: 1

## 2025-06-02 ASSESSMENT — LIFESTYLE VARIABLES
EVER HAD A DRINK FIRST THING IN THE MORNING TO STEADY YOUR NERVES TO GET RID OF A HANGOVER: NO
EVER FELT BAD OR GUILTY ABOUT YOUR DRINKING: NO
TOTAL SCORE: 0
HAVE PEOPLE ANNOYED YOU BY CRITICIZING YOUR DRINKING: NO
HAVE YOU EVER FELT YOU SHOULD CUT DOWN ON YOUR DRINKING: NO

## 2025-06-02 ASSESSMENT — PAIN DESCRIPTION - LOCATION
LOCATION: BACK
LOCATION: BACK

## 2025-06-02 ASSESSMENT — PAIN DESCRIPTION - PAIN TYPE: TYPE: ACUTE PAIN

## 2025-06-02 ASSESSMENT — PAIN - FUNCTIONAL ASSESSMENT
PAIN_FUNCTIONAL_ASSESSMENT: 0-10
PAIN_FUNCTIONAL_ASSESSMENT: 0-10

## 2025-06-02 ASSESSMENT — PAIN DESCRIPTION - PROGRESSION: CLINICAL_PROGRESSION: NOT CHANGED

## 2025-06-02 NOTE — Clinical Note
Los Rubio was seen and treated in our emergency department on 6/2/2025.  He may return to work on 06/05/2025.       If you have any questions or concerns, please don't hesitate to call.      Christ Adams PA-C

## 2025-06-02 NOTE — ED PROVIDER NOTES
HPI   Chief Complaint   Patient presents with    Back Pain       Patient is a 54-year-old male presenting with concerns for back pain.  This is been onset for several days.  It is midline.  Does occasionally radiate down his right leg.  Reportedly, the day prior to his pain he was lifting heavy areli of water.  When he woke up the following morning he began having pain.  He has been doing heating pads and over-the-counter medications as well as topical medications with some relief.  Pain is a 6 out of 10.  Discomfort with sitting and turning.  Sharp in nature.  No fevers or chills.  No bowel or bladder incontinence.  No saddle anesthesia.  No IV drug use.  No other red flag symptoms present.  Patient denies fevers, chills, cough, sore throat, runny nose, chest pain, shortness of breath, abdominal pain, nausea, vomiting, diarrhea or urinary complaints.              Patient History   Medical History[1]  Surgical History[2]  Family History[3]  Social History[4]    Physical Exam   ED Triage Vitals   Temperature Heart Rate Respirations BP   06/02/25 1259 06/02/25 1259 06/02/25 1259 06/02/25 1259   36.6 °C (97.9 °F) 84 18 (!) 147/106      Pulse Ox Temp Source Heart Rate Source Patient Position   06/02/25 1259 06/02/25 1259 06/02/25 1259 06/02/25 1337   100 % Tympanic Monitor Sitting      BP Location FiO2 (%)     06/02/25 1337 --     Left arm        Physical Exam  Vitals and nursing note reviewed.   Constitutional:       Appearance: He is well-developed.      Comments: Awake, sitting in examination room   HENT:      Head: Normocephalic and atraumatic.      Nose: Nose normal.      Mouth/Throat:      Mouth: Mucous membranes are moist.      Pharynx: Oropharynx is clear.   Eyes:      Extraocular Movements: Extraocular movements intact.      Conjunctiva/sclera: Conjunctivae normal.      Pupils: Pupils are equal, round, and reactive to light.   Cardiovascular:      Rate and Rhythm: Normal rate and regular rhythm.      Pulses:  Normal pulses.      Heart sounds: Normal heart sounds. No murmur heard.  Pulmonary:      Effort: Pulmonary effort is normal. No respiratory distress.      Breath sounds: Normal breath sounds.   Abdominal:      General: Abdomen is flat.      Palpations: Abdomen is soft.      Tenderness: There is no abdominal tenderness.   Musculoskeletal:         General: No swelling. Normal range of motion.      Cervical back: Normal range of motion and neck supple.      Comments: Midline lumbar tenderness to palpation   Skin:     General: Skin is warm and dry.      Capillary Refill: Capillary refill takes less than 2 seconds.   Neurological:      General: No focal deficit present.      Mental Status: He is alert and oriented to person, place, and time.      Comments: Awake, alert and oriented x 3. Power intact in the upper and lower extremities. Sensation is intact to light touch in the upper and lower extremities. Cranial Nerves 2-12 are intact. Patella DTRs intact. Finger-to-nose intact. No truncal ataxia.   Psychiatric:         Mood and Affect: Mood normal.         Behavior: Behavior normal.           ED Course & MDM   Diagnoses as of 06/02/25 1503   Acute midline low back pain with right-sided sciatica                 No data recorded     [place] Coma Scale Score: 15 (06/02/25 1300 : Meghann Fletcher RN)                           Medical Decision Making  Patient is a 54-year-old male presenting concerns for back pain.  Conditions considered include but are not limited: Contusion, fracture, muscular origin, disc herniation.    Attending physician was available for consultation on the patient.  Due to a normal neurologic exam and no red flag symptoms, with withhold imaging at this time.  Patient was given prednisone, Toradol and Norflex while in the ED.  Does endorse some improvement to his discomfort.    I believe this patient is at low risk for complication, and a disposition of discharge is acceptable.  Return to the Emergency  Department if new or worsening symptoms including headache, fever, chills, chest pain, shortness of breath, syncope, near syncope, abdominal pain, nausea, vomiting,  diarrhea, or worsening pain.  Prescription for prednisone, lidocaine patches and Robaxin written.  Encourage patient to utilize these as well as over-the-counter medications and heating pad to help with discomfort.  Did discuss the patient should rest otherwise pain will likely not improved.  We did discuss not operate heavy machinery while on muscle relaxers.  Patient is agreeable to a disposition of discharge and to follow with respective fields in the next several days.    Portions of this note made with Dragon software, please be mindful of potential grammatical errors.      Medications   orphenadrine (Norflex) injection 60 mg (60 mg intramuscular Given 6/2/25 1329)   ketorolac (Toradol) injection 15 mg (15 mg intramuscular Given 6/2/25 1329)   predniSONE (Deltasone) tablet 40 mg (40 mg oral Given 6/2/25 1329)         Procedure  Procedures       [1]   Past Medical History:  Diagnosis Date    Alcohol abuse 01/16/2025    Allergic rhinitis 05/13/2014    Asthma without status asthmaticus ([facility]) 05/14/2025    Cardiomyopathy 11/12/2024    cardio    Chronic systolic congestive heart failure 01/16/2025    cardio    Hypertension     cardio    SVT (supraventricular tachycardia) 01/16/2025    cardio    Typical atrial flutter ([facility]) 05/08/2025    cardio   [2]   Past Surgical History:  Procedure Laterality Date    CARDIAC ELECTROPHYSIOLOGY PROCEDURE N/A 2/5/2025    Procedure: EP Study;  Surgeon: Philip Williamson MD;  Location: [facility] Cardiac Cath Lab;  Service: Electrophysiology;  Laterality: N/A;  EP study with SVT ablation CPT 25058+52678; ICD I47.1    CARDIAC ELECTROPHYSIOLOGY PROCEDURE N/A 2/5/2025    Procedure: Ablation SVT;  Surgeon: Philip Williamson MD;  Location: [facility] Cardiac Cath Lab;  Service: Electrophysiology;  Laterality: N/A;   [3] No family  history on file.  [4]   Social History  Tobacco Use    Smoking status: Every Day     Current packs/day: 2.00     Average packs/day: 2.0 packs/day for 35.4 years (70.8 ttl pk-yrs)     Types: Cigarettes     Start date: 1990    Smokeless tobacco: Former   Substance Use Topics    Alcohol use: Not on file    Drug use: Yes     Types: Marijuana        Christ Adams PA-C  06/02/25 1509

## 2025-06-02 NOTE — DISCHARGE INSTRUCTIONS
Do not operate heavy machinery if you are going to be taking muscle relaxers.  They may make you feel lightheaded.  Utilize muscle relaxers, prednisone and lidocaine patches for pain as open continue with over-the-counter medications such as ibuprofen/Tylenol.  Also encouraged heating pad.  Strong encouraged rest because if you do not rest, these medications will likely not be as effective.    Be sure to take all medications, over the counter medications or prescription medications only as directed.    Be sure to follow up as directed in 1-2 days. All of the details of your follow up instructions are detailed in the follow up section of this packet.    If you are being discharged with any pains medications or muscle relaxers (norco, Vicodin, hydrocodone products, Percocet, oxycodone products, flexeril, cyclobenzaprine, robaxin, norflex, brand or generic, or any other pain controlling medications with the exception of Ibuprofen and regular Tylenol, do not drive or operate machinery, climb ladders or participate in any activity that could potentially put yourself or others at risk should you get dizzy, or be/feel impaired at all.    Return to emergency room without delay for ANY new or worsening pains or for any other symptoms or concerns. Return with worsening pains, nausea, vomiting, trouble breathing, palpitations, shortness of breath, inability to pass stool or urine, loss of control of stool or urine, any numbness or tingling (that is not normal for you), uncontrolled fevers, the passing of blood or other material in stool or urine, rashes, pains or for any other symptoms or concerns you may have. You are always welcome to return to the ER at any time for any reason or for any other concerns you may have.

## 2025-06-08 ENCOUNTER — APPOINTMENT (OUTPATIENT)
Dept: CARDIOLOGY | Facility: HOSPITAL | Age: 55
End: 2025-06-08
Payer: COMMERCIAL

## 2025-06-08 ENCOUNTER — HOSPITAL ENCOUNTER (EMERGENCY)
Facility: HOSPITAL | Age: 55
Discharge: HOME | End: 2025-06-09
Attending: EMERGENCY MEDICINE
Payer: COMMERCIAL

## 2025-06-08 DIAGNOSIS — Z72.0 TOBACCO ABUSE: ICD-10-CM

## 2025-06-08 DIAGNOSIS — J45.901 EXACERBATION OF ASTHMA, UNSPECIFIED ASTHMA SEVERITY, UNSPECIFIED WHETHER PERSISTENT (HHS-HCC): Primary | ICD-10-CM

## 2025-06-08 PROCEDURE — 99285 EMERGENCY DEPT VISIT HI MDM: CPT | Performed by: EMERGENCY MEDICINE

## 2025-06-08 PROCEDURE — 96374 THER/PROPH/DIAG INJ IV PUSH: CPT

## 2025-06-08 PROCEDURE — 94640 AIRWAY INHALATION TREATMENT: CPT | Mod: 59

## 2025-06-08 PROCEDURE — 2500000002 HC RX 250 W HCPCS SELF ADMINISTERED DRUGS (ALT 637 FOR MEDICARE OP, ALT 636 FOR OP/ED): Performed by: EMERGENCY MEDICINE

## 2025-06-08 PROCEDURE — 2500000004 HC RX 250 GENERAL PHARMACY W/ HCPCS (ALT 636 FOR OP/ED): Mod: JZ | Performed by: EMERGENCY MEDICINE

## 2025-06-08 PROCEDURE — 93005 ELECTROCARDIOGRAM TRACING: CPT

## 2025-06-08 RX ORDER — IPRATROPIUM BROMIDE AND ALBUTEROL SULFATE 2.5; .5 MG/3ML; MG/3ML
3 SOLUTION RESPIRATORY (INHALATION)
Status: DISCONTINUED | OUTPATIENT
Start: 2025-06-09 | End: 2025-06-09 | Stop reason: HOSPADM

## 2025-06-08 RX ORDER — ALBUTEROL SULFATE 0.83 MG/ML
5 SOLUTION RESPIRATORY (INHALATION) ONCE
Status: COMPLETED | OUTPATIENT
Start: 2025-06-08 | End: 2025-06-08

## 2025-06-08 RX ORDER — IPRATROPIUM BROMIDE AND ALBUTEROL SULFATE 2.5; .5 MG/3ML; MG/3ML
3 SOLUTION RESPIRATORY (INHALATION) ONCE
Status: COMPLETED | OUTPATIENT
Start: 2025-06-08 | End: 2025-06-08

## 2025-06-08 RX ADMIN — METHYLPREDNISOLONE SODIUM SUCCINATE 125 MG: 125 INJECTION, POWDER, FOR SOLUTION INTRAMUSCULAR; INTRAVENOUS at 22:56

## 2025-06-08 RX ADMIN — ALBUTEROL SULFATE 5 MG: 2.5 SOLUTION RESPIRATORY (INHALATION) at 22:53

## 2025-06-08 RX ADMIN — IPRATROPIUM BROMIDE AND ALBUTEROL SULFATE 3 ML: 2.5; .5 SOLUTION RESPIRATORY (INHALATION) at 22:53

## 2025-06-08 ASSESSMENT — PAIN DESCRIPTION - LOCATION: LOCATION: CHEST

## 2025-06-08 ASSESSMENT — PAIN DESCRIPTION - DESCRIPTORS
DESCRIPTORS: TIGHTNESS
DESCRIPTORS: TIGHTNESS

## 2025-06-08 ASSESSMENT — PAIN SCALES - GENERAL: PAINLEVEL_OUTOF10: 0 - NO PAIN

## 2025-06-08 ASSESSMENT — PAIN - FUNCTIONAL ASSESSMENT: PAIN_FUNCTIONAL_ASSESSMENT: 0-10

## 2025-06-09 VITALS
BODY MASS INDEX: 22.4 KG/M2 | WEIGHT: 160 LBS | HEART RATE: 75 BPM | OXYGEN SATURATION: 95 % | TEMPERATURE: 98.1 F | RESPIRATION RATE: 25 BRPM | SYSTOLIC BLOOD PRESSURE: 133 MMHG | HEIGHT: 71 IN | DIASTOLIC BLOOD PRESSURE: 92 MMHG

## 2025-06-09 PROBLEM — M54.50 CHRONIC BILATERAL LOW BACK PAIN: Status: ACTIVE | Noted: 2025-06-09

## 2025-06-09 PROBLEM — G89.29 CHRONIC BILATERAL LOW BACK PAIN: Status: ACTIVE | Noted: 2025-06-09

## 2025-06-09 PROCEDURE — 2500000002 HC RX 250 W HCPCS SELF ADMINISTERED DRUGS (ALT 637 FOR MEDICARE OP, ALT 636 FOR OP/ED): Performed by: EMERGENCY MEDICINE

## 2025-06-09 RX ORDER — PREDNISONE 50 MG/1
50 TABLET ORAL DAILY
Qty: 4 TABLET | Refills: 0 | Status: SHIPPED | OUTPATIENT
Start: 2025-06-09 | End: 2025-06-13

## 2025-06-09 RX ADMIN — IPRATROPIUM BROMIDE AND ALBUTEROL SULFATE 3 ML: 2.5; .5 SOLUTION RESPIRATORY (INHALATION) at 00:02

## 2025-06-09 NOTE — ED NOTES
Went over discharge instructions and prescriptions with pt. Pt verbalized understanding. No further issues at time of discharge.     David Costello RN  06/09/25 0055

## 2025-06-09 NOTE — ED PROVIDER NOTES
"EMERGENCY DEPARTMENT ENCOUNTER      Pt Name: Los Rubio  MRN: [id number]  Birthdate 1970  Date of evaluation: 6/8/2025  ED Provider: Yana Dozier DO     CHIEF COMPLAINT       Chief Complaint   Patient presents with    Asthma     Pt present to the ED with c/o chest tightness and sob after smoking \"black and mild\". Pt reports hx of asthma that he takes rescue inhaler for. Pt states he believes his asthma must have been triggered by the cigarette he smoked.       HISTORY OF PRESENT ILLNESS    Los Rubio is a 54 y.o. who presents to the emergency department with complaint of asthma.  He has a history of asthma for which he has rescue inhalers and nebulizers but is not on no regular maintenance treatments.  He does smoke cigars and feels his current asthma was triggered by the cigar that he was smoking.  He complains of tightness in his chest with breathing.  No lightheadedness or dizziness.  No other acute complaints.    REVIEW OF SYSTEMS     Focused ROS performed and negative other than as listed in HPI    PAST MEDICAL HISTORY   Medical History[1]    SURGICAL HISTORY     Surgical History[2]    CURRENT MEDICATIONS       Previous Medications    ALBUTEROL 90 MCG/ACTUATION INHALER    Inhale 2 puffs every 6 hours if needed for wheezing.    CARVEDILOL (COREG) 6.25 MG TABLET    Take 1 tablet (6.25 mg) by mouth 2 times daily (morning and late afternoon).    CARVEDILOL (COREG) 6.25 MG TABLET    Take 1 tablet (6.25 mg) by mouth 2 times daily (morning and late afternoon).    FLUTICASONE PROPION-SALMETEROL (ADVAIR DISKUS) 500-50 MCG/DOSE DISKUS INHALER    Inhale 1 puff 2 times a day. Rinse mouth with water after use to reduce aftertaste and incidence of candidiasis. Do not swallow.    LIDOCAINE (LIDODERM) 5 % PATCH    Place 1 patch over 12 hours on the skin once daily as needed for mild pain (1 - 3) for up to 10 days. Remove & discard patch within 12 hours or as directed by MD.    LOSARTAN-HYDROCHLOROTHIAZIDE " (HYZAAR) 50-12.5 MG TABLET    Take 1 tablet by mouth once daily.    METHOCARBAMOL (ROBAXIN) 500 MG TABLET    Take 1 tablet (500 mg) by mouth 2 times a day for 7 days.    THIAMINE (VITAMIN B-1) 100 MG TABLET    Take 1 tablet (100 mg) by mouth once daily.       ALLERGIES     Shellfish derived    FAMILY HISTORY     Family History[3]     SOCIAL HISTORY     Social History[4]    PHYSICAL EXAM       ED Triage Vitals [06/08/25 2240]   Temperature Heart Rate Respirations BP   36.7 °C (98.1 °F) 83 (!) 21 (!) 129/98      Pulse Ox Temp Source Heart Rate Source Patient Position   95 % Oral Monitor --      BP Location FiO2 (%)     -- --        General: Appears as stated age, no acute distress, alert  Head: Head atraumatic; normocephalic  Eyes: normal inspection; no icterus  ENT: mucosa moist without lesion  Neck: Normal inspection, no meningeal signs  Resp: Diminished throughout with diffuse expiratory wheezing; No respiratory distress  Chest Wall: no tenderness or deformity  Heart: Heart rate and rhythm regular; No Murmurs  Abdomen: Soft, Non-tender; No distention, guarding, rigidity, or rebound  MSK: Normal appearance; Moves all extremities; No Pedal edema  Neuro: Alert; no focal deficits, moves all extremities  Psych: Mood and Affect normal  Skin: Color appropriate; warm; Dry    DIAGNOSTIC RESULTS   Lab and radiology results are independently interpreted unless noted below.  EKG:  Personally interpreted by Yana Dozier, DO  2251: Normal sinus rhythm with ventricular rate 67 normal axis intervals no acute ischemic changes    EMERGENCY DEPARTMENT COURSE/MDM   Patient presents with complaint of asthma laceration after smoking a pack a mild.  He is provided breathing treatments.  He has no overt respiratory distress though he states his chest feels tight.  Given this EKG will be obtained however I suspect the tightness is secondary to the asthma exacerbation.  Will reassess after treatments.  Patient is agreeable with this plan  of care.    ED Course as of 06/09/25 0044 Mon Jun 09, 2025   0038 On reevaluation patient is feeling much improved. Lungs are clear and chest tightness is resolved. Pt comfortable with plan of discharge. Referral to pcp sent, pt provided rx for steroids. Tobacco cessation discussed. [EF]      ED Course User Index  [EF] Yana Dozier,          Diagnoses as of 06/09/25 0044   Exacerbation of asthma, unspecified asthma severity, unspecified whether persistent ([facility])   Tobacco abuse       Meds Administered:  Medications   ipratropium-albuteroL (Duo-Neb) 0.5-2.5 mg/3 mL nebulizer solution 3 mL (3 mL nebulization Given 6/9/25 0002)   ipratropium-albuteroL (Duo-Neb) 0.5-2.5 mg/3 mL nebulizer solution 3 mL (3 mL nebulization Given 6/8/25 2253)   albuterol 2.5 mg /3 mL (0.083 %) nebulizer solution 5 mg (5 mg nebulization Given 6/8/25 2253)   methylPREDNISolone sod succinate (SOLU-Medrol) injection 125 mg (125 mg intravenous Given 6/8/25 2256)       PROCEDURES   Unless otherwise noted below, none  Procedures      FINAL IMPRESSION      1. Exacerbation of asthma, unspecified asthma severity, unspecified whether persistent ([facility])    2. Tobacco abuse          DISPOSITION    Discharge 06/09/2025 12:40:59 AM  As a result of the work-up, the patient was discharged home.  he was informed of his diagnosis and instructed to come back with any concerns or worsening of condition.  he and was agreeable to the plan as discussed above.  he was given the opportunity to ask questions.  All of the patient's questions were answered.    Critical Care time: Not Indicated    (Comment: Please note this report has been produced using speech recognition software and may contain errors related to that system including errors in grammar, punctuation, and spelling, as well as words and phrases that may be inappropriate.  If there are any questions or concerns please feel free to contact the dictating provider for clarification.)    Yana GAMBLE  DO Jacoby, MPH (electronically signed)  Emergency Medicine Physician    History provided by: Patient  Limitations to History: None  External Records Reviewed with Brief Summary: Outpatient medication review as well as prior ED reports  Social Determinants of Health which Significantly Impact Care: Difficulty obtaining outpatient follow-up   EKG Independent Interpretation: EKG interpreted by myself. Please see ED Course for full interpretation.  Independent Result Review and Interpretation: Relevant laboratory and radiographic results were reviewed and independently interpreted by myself.  As necessary, they are commented on in the ED Course.  Chronic conditions affecting the patient's care: As documented above in MDM  The patient was discussed with the following consultants/services: None  Care Considerations: As documented above in MDM                 [1]   Past Medical History:  Diagnosis Date    Alcohol abuse 01/16/2025    Allergic rhinitis 05/13/2014    Asthma without status asthmaticus ([facility]) 05/14/2025    Cardiomyopathy 11/12/2024    cardio    Chronic systolic congestive heart failure 01/16/2025    cardio    Hypertension     cardio    SVT (supraventricular tachycardia) 01/16/2025    cardio    Typical atrial flutter ([facility]) 05/08/2025    cardio   [2]   Past Surgical History:  Procedure Laterality Date    CARDIAC ELECTROPHYSIOLOGY PROCEDURE N/A 2/5/2025    Procedure: EP Study;  Surgeon: Philip Williamson MD;  Location: [facility] Cardiac Cath Lab;  Service: Electrophysiology;  Laterality: N/A;  EP study with SVT ablation CPT [id number]; ICD I47.1    CARDIAC ELECTROPHYSIOLOGY PROCEDURE N/A 2/5/2025    Procedure: Ablation SVT;  Surgeon: Philip Williamson MD;  Location: [facility] Cardiac Cath Lab;  Service: Electrophysiology;  Laterality: N/A;   [3] No family history on file.  [4]   Social History  Socioeconomic History    Marital status: Single   Tobacco Use    Smoking status: Every Day     Current packs/day: 2.00      Average packs/day: 2.0 packs/day for 35.4 years (70.9 ttl pk-yrs)     Types: Cigarettes     Start date: 1990    Smokeless tobacco: Former   Substance and Sexual Activity    Drug use: Yes     Types: Marijuana    Sexual activity: Defer     Social Drivers of Health     Financial Resource Strain: Medium Risk (1/30/2025)    Overall Financial Resource Strain (CARDIA)     Difficulty of Paying Living Expenses: Somewhat hard   Food Insecurity: No Food Insecurity (1/30/2025)    Hunger Vital Sign     Worried About Running Out of Food in the Last Year: Never true     Ran Out of Food in the Last Year: Never true   Transportation Needs: No Transportation Needs (1/30/2025)    PRAPARE - Transportation     Lack of Transportation (Medical): No     Lack of Transportation (Non-Medical): No   Physical Activity: Inactive (1/31/2025)    Exercise Vital Sign     Days of Exercise per Week: 0 days     Minutes of Exercise per Session: 0 min   Stress: No Stress Concern Present (1/31/2025)    [employer] [place] [employer] - Occupational Stress Questionnaire     Feeling of Stress : Only a little   Social Connections: Socially Isolated (1/31/2025)    Social Connection and Isolation Panel [NHANES]     Frequency of Communication with Friends and Family: Never     Frequency of Social Gatherings with Friends and Family: Never     Attends Holiness Services: Never     Active Member of Clubs or Organizations: No     Attends Club or Organization Meetings: Never     Marital Status:    Intimate Partner Violence: Not At Risk (1/30/2025)    Humiliation, Afraid, Rape, and Kick questionnaire     Fear of Current or Ex-Partner: No     Emotionally Abused: No     Physically Abused: No     Sexually Abused: No   Housing Stability: Low Risk  (1/30/2025)    Housing Stability Vital Sign     Unable to Pay for Housing in the Last Year: No     Number of Times Moved in the Last Year: 0     Homeless in the Last Year: No        Yana Dozier DO  06/09/25  [id number]

## 2025-06-09 NOTE — PROGRESS NOTES
Subjective   Patient ID:   Los Rubio is a 54 y.o. male who presents for Establish Care.  HPI  New patient here today to establish care with myself.  Last PCP: N/A  Last seen:    ED follow up 1:  Was in the ED on 6/2/25.  ED note reviewed.  Had a flare up of chronic low back pain.  Was given Lidocaine patches, Prednisone, Robaxin.  No imaging was performed.  Most likely due to wear and tear from work.    ED follow up 2:  Was in the ED on 6/8/25.  ED note reviewed.  Had an asthma exacerbation due to smoking.  Was given Prednisone.  See asthma note below.    Asthma:  Taking Albuterol, Advair.  Has been without his Advair x 3 months  Breathing has been worse recently.  Working in difficult breathing environments.  Last chest CT was in Jan 2025 and was recommended to repeat in 3 months.    HTN/SVT/Atrial flutter/CHF:  Seeing cardiology.  Taking Carvedilol, Losartan-hydrochlorothiazide  BP today is 110/74.    Low back pain:  Taking Prednisone, Robaxin.  Off and on depending on activity level.    Corns on feet:  Symptoms x years.  Has tried to remove them himself without success.  Would like to see podiatry.    ED:  Would like to try Viagra/Cialis.    Health maintenance:  Smoking: Current smoker.  PSA (50+): DUE  Labs: DUE  Colonoscopy (50-75): DUE  Influenza:    Review of Systems  12 point review of systems negative unless stated above in HPI    Vitals:    06/18/25 1125   BP: 110/74   Pulse: 74   SpO2: 97%     Physical Exam  General: Alert and oriented, well nourished, no acute distress.  Lungs: Clear to auscultation, non-labored respiration.  Heart: Normal rate, regular rhythm, no murmur, gallop or edema.  Neurologic: Awake, alert, and oriented X3, CN II-XII intact.  Psychiatric: Cooperative, appropriate mood and affect.    Assessment/Plan   It was nice meeting you!  I have sent in albuterol nebulizer solution and refilled the Advair.  I have placed a referral to pulmonology for further management.  I have ordered  a chest CT to be done.  We will call the results.  I have ordered some labs to be done as soon as you can.  We will call you with the results.  I have placed a referral for you to have a colonoscopy done as soon as you are able to.  I have placed a referral to podiatry for further management.  I have sent in Robaxin for your back pains.  I have sent in generic Viagra as well.  If symptoms persist or worsen despite current plan of care, please contact your healthcare provider for further evaluation.  Patient instructed to contact the office if there are any questions regarding their care or treatment.   [facility] [phone]    Fu 1 month for physical  Diagnoses and all orders for this visit:  Mild intermittent asthma without status asthmaticus without complication ([facility])  -     albuterol 1.25 mg/3 mL nebulizer solution; Take 3 mL (1.25 mg) by nebulization every 4 hours if needed for wheezing (cough).  -     Referral to Pulmonology; Future  Essential hypertension  Typical atrial flutter ([facility])  -     Comprehensive Metabolic Panel; Future  -     Lipid Panel; Future  -     CBC and Auto Differential; Future  SVT (supraventricular tachycardia)  -     fluticasone propion-salmeteroL (Advair Diskus) 500-50 mcg/dose diskus inhaler; Inhale 1 puff 2 times a day. Rinse mouth with water after use to reduce aftertaste and incidence of candidiasis. Do not swallow.  Chronic systolic congestive heart failure  Cardiomyopathy, unspecified type ([facility])  Chronic bilateral low back pain, unspecified whether sciatica present  -     methocarbamol (Robaxin) 750 mg tablet; Take 1 tablet (750 mg) by mouth 3 times a day.  Screening PSA (prostate specific antigen)  -     Prostate Specific Antigen, Screen; Future  Lipid screening  -     Lipid Panel; Future  Exacerbation of asthma, unspecified asthma severity, unspecified whether persistent ([facility])  -     Referral to Primary Care  Tobacco abuse  -     Referral to  Primary Care  Mild intermittent asthma with exacerbation ([facility])  -     albuterol 90 mcg/actuation inhaler; Inhale 2 puffs every 6 hours if needed for wheezing.  Asthma, unspecified asthma severity, unspecified whether complicated, unspecified whether persistent ([facility])  -     albuterol 90 mcg/actuation inhaler; Inhale 2 puffs every 6 hours if needed for wheezing.  -     CT chest wo IV contrast; Future  Lung nodules  -     CT chest wo IV contrast; Future  Colon cancer screening  -     Colonoscopy Screening; Average Risk Patient; Future  Corns  -     Referral to Podiatry; Future  Erectile dysfunction, unspecified erectile dysfunction type  -     sildenafil (Viagra) 100 mg tablet; Take 1 tablet (100 mg) by mouth once daily as needed for erectile dysfunction.

## 2025-06-12 LAB
ATRIAL RATE: 67 BPM
P AXIS: 74 DEGREES
P OFFSET: 218 MS
P ONSET: 170 MS
PR INTERVAL: 112 MS
Q ONSET: 226 MS
QRS COUNT: 11 BEATS
QRS DURATION: 80 MS
QT INTERVAL: 406 MS
QTC CALCULATION(BAZETT): 429 MS
QTC FREDERICIA: 421 MS
R AXIS: 4 DEGREES
T AXIS: 53 DEGREES
T OFFSET: 429 MS
VENTRICULAR RATE: 67 BPM

## 2025-06-18 ENCOUNTER — OFFICE VISIT (OUTPATIENT)
Dept: PRIMARY CARE | Facility: CLINIC | Age: 55
End: 2025-06-18
Payer: COMMERCIAL

## 2025-06-18 VITALS
DIASTOLIC BLOOD PRESSURE: 74 MMHG | WEIGHT: 155 LBS | OXYGEN SATURATION: 97 % | BODY MASS INDEX: 21.7 KG/M2 | SYSTOLIC BLOOD PRESSURE: 110 MMHG | HEART RATE: 74 BPM | HEIGHT: 71 IN

## 2025-06-18 DIAGNOSIS — Z72.0 TOBACCO ABUSE: ICD-10-CM

## 2025-06-18 DIAGNOSIS — M54.50 CHRONIC BILATERAL LOW BACK PAIN, UNSPECIFIED WHETHER SCIATICA PRESENT: ICD-10-CM

## 2025-06-18 DIAGNOSIS — Z12.5 SCREENING PSA (PROSTATE SPECIFIC ANTIGEN): ICD-10-CM

## 2025-06-18 DIAGNOSIS — G89.29 CHRONIC BILATERAL LOW BACK PAIN, UNSPECIFIED WHETHER SCIATICA PRESENT: ICD-10-CM

## 2025-06-18 DIAGNOSIS — J45.20 MILD INTERMITTENT ASTHMA WITHOUT STATUS ASTHMATICUS WITHOUT COMPLICATION (HHS-HCC): Primary | ICD-10-CM

## 2025-06-18 DIAGNOSIS — J45.901 EXACERBATION OF ASTHMA, UNSPECIFIED ASTHMA SEVERITY, UNSPECIFIED WHETHER PERSISTENT (HHS-HCC): ICD-10-CM

## 2025-06-18 DIAGNOSIS — Z13.220 LIPID SCREENING: ICD-10-CM

## 2025-06-18 DIAGNOSIS — L84 CORNS: ICD-10-CM

## 2025-06-18 DIAGNOSIS — N52.9 ERECTILE DYSFUNCTION, UNSPECIFIED ERECTILE DYSFUNCTION TYPE: ICD-10-CM

## 2025-06-18 DIAGNOSIS — J45.21 MILD INTERMITTENT ASTHMA WITH EXACERBATION (HHS-HCC): ICD-10-CM

## 2025-06-18 DIAGNOSIS — J45.909 ASTHMA, UNSPECIFIED ASTHMA SEVERITY, UNSPECIFIED WHETHER COMPLICATED, UNSPECIFIED WHETHER PERSISTENT (HHS-HCC): ICD-10-CM

## 2025-06-18 DIAGNOSIS — Z12.11 COLON CANCER SCREENING: ICD-10-CM

## 2025-06-18 DIAGNOSIS — R91.8 LUNG NODULES: ICD-10-CM

## 2025-06-18 DIAGNOSIS — I50.22 CHRONIC SYSTOLIC CONGESTIVE HEART FAILURE: ICD-10-CM

## 2025-06-18 DIAGNOSIS — I10 ESSENTIAL HYPERTENSION: ICD-10-CM

## 2025-06-18 DIAGNOSIS — I42.9 CARDIOMYOPATHY, UNSPECIFIED TYPE (MULTI): ICD-10-CM

## 2025-06-18 DIAGNOSIS — I48.3 TYPICAL ATRIAL FLUTTER (MULTI): ICD-10-CM

## 2025-06-18 DIAGNOSIS — I47.10 SVT (SUPRAVENTRICULAR TACHYCARDIA): ICD-10-CM

## 2025-06-18 PROCEDURE — 3074F SYST BP LT 130 MM HG: CPT | Performed by: PHYSICIAN ASSISTANT

## 2025-06-18 PROCEDURE — 99204 OFFICE O/P NEW MOD 45 MIN: CPT | Performed by: PHYSICIAN ASSISTANT

## 2025-06-18 PROCEDURE — 3008F BODY MASS INDEX DOCD: CPT | Performed by: PHYSICIAN ASSISTANT

## 2025-06-18 PROCEDURE — 3078F DIAST BP <80 MM HG: CPT | Performed by: PHYSICIAN ASSISTANT

## 2025-06-18 PROCEDURE — 4004F PT TOBACCO SCREEN RCVD TLK: CPT | Performed by: PHYSICIAN ASSISTANT

## 2025-06-18 RX ORDER — FLUTICASONE PROPIONATE AND SALMETEROL 500; 50 UG/1; UG/1
1 POWDER RESPIRATORY (INHALATION)
Qty: 60 EACH | Refills: 2 | Status: SHIPPED | OUTPATIENT
Start: 2025-06-18

## 2025-06-18 RX ORDER — METHOCARBAMOL 750 MG/1
750 TABLET, FILM COATED ORAL 3 TIMES DAILY
Qty: 270 TABLET | Refills: 0 | Status: SHIPPED | OUTPATIENT
Start: 2025-06-18 | End: 2025-09-16

## 2025-06-18 RX ORDER — ALBUTEROL SULFATE 90 UG/1
2 INHALANT RESPIRATORY (INHALATION) EVERY 6 HOURS PRN
Qty: 18 G | Refills: 1 | Status: SHIPPED | OUTPATIENT
Start: 2025-06-18 | End: 2026-06-18

## 2025-06-18 RX ORDER — ALBUTEROL SULFATE 1.25 MG/3ML
1.25 SOLUTION RESPIRATORY (INHALATION) EVERY 4 HOURS PRN
Qty: 90 ML | Refills: 2 | Status: SHIPPED | OUTPATIENT
Start: 2025-06-18 | End: 2025-09-16

## 2025-06-18 RX ORDER — SILDENAFIL 100 MG/1
100 TABLET, FILM COATED ORAL DAILY PRN
Qty: 12 TABLET | Refills: 3 | Status: SHIPPED | OUTPATIENT
Start: 2025-06-18 | End: 2026-06-18

## 2025-06-18 ASSESSMENT — PATIENT HEALTH QUESTIONNAIRE - PHQ9
2. FEELING DOWN, DEPRESSED OR HOPELESS: NOT AT ALL
SUM OF ALL RESPONSES TO PHQ9 QUESTIONS 1 AND 2: 0
1. LITTLE INTEREST OR PLEASURE IN DOING THINGS: NOT AT ALL

## 2025-06-18 ASSESSMENT — ENCOUNTER SYMPTOMS
DEPRESSION: 0
LOSS OF SENSATION IN FEET: 0
OCCASIONAL FEELINGS OF UNSTEADINESS: 0

## 2025-06-18 ASSESSMENT — PAIN SCALES - GENERAL: PAINLEVEL_OUTOF10: 0-NO PAIN

## 2025-07-01 LAB
ALBUMIN SERPL-MCNC: 4 G/DL (ref 3.6–5.1)
ALP SERPL-CCNC: 49 U/L (ref 35–144)
ALT SERPL-CCNC: 16 U/L (ref 9–46)
ANION GAP SERPL CALCULATED.4IONS-SCNC: 5 MMOL/L (CALC) (ref 7–17)
AST SERPL-CCNC: 13 U/L (ref 10–35)
BASOPHILS # BLD AUTO: 71 CELLS/UL (ref 0–200)
BASOPHILS NFR BLD AUTO: 0.8 %
BILIRUB SERPL-MCNC: 0.5 MG/DL (ref 0.2–1.2)
BUN SERPL-MCNC: 14 MG/DL (ref 7–25)
CALCIUM SERPL-MCNC: 9.1 MG/DL (ref 8.6–10.3)
CHLORIDE SERPL-SCNC: 104 MMOL/L (ref 98–110)
CHOLEST SERPL-MCNC: 164 MG/DL
CHOLEST/HDLC SERPL: 2.2 (CALC)
CO2 SERPL-SCNC: 29 MMOL/L (ref 20–32)
CREAT SERPL-MCNC: 0.81 MG/DL (ref 0.7–1.3)
EGFRCR SERPLBLD CKD-EPI 2021: 105 ML/MIN/1.73M2
EOSINOPHIL # BLD AUTO: 187 CELLS/UL (ref 15–500)
EOSINOPHIL NFR BLD AUTO: 2.1 %
ERYTHROCYTE [DISTWIDTH] IN BLOOD BY AUTOMATED COUNT: 15.7 % (ref 11–15)
GLUCOSE SERPL-MCNC: 104 MG/DL (ref 65–99)
HCT VFR BLD AUTO: 41.7 % (ref 38.5–50)
HDLC SERPL-MCNC: 73 MG/DL
HGB BLD-MCNC: 13.2 G/DL (ref 13.2–17.1)
LDLC SERPL CALC-MCNC: 77 MG/DL (CALC)
LYMPHOCYTES # BLD AUTO: 2501 CELLS/UL (ref 850–3900)
LYMPHOCYTES NFR BLD AUTO: 28.1 %
MCH RBC QN AUTO: 26.8 PG (ref 27–33)
MCHC RBC AUTO-ENTMCNC: 31.7 G/DL (ref 32–36)
MCV RBC AUTO: 84.8 FL (ref 80–100)
MONOCYTES # BLD AUTO: 765 CELLS/UL (ref 200–950)
MONOCYTES NFR BLD AUTO: 8.6 %
NEUTROPHILS # BLD AUTO: 5376 CELLS/UL (ref 1500–7800)
NEUTROPHILS NFR BLD AUTO: 60.4 %
NONHDLC SERPL-MCNC: 91 MG/DL (CALC)
PLATELET # BLD AUTO: 403 THOUSAND/UL (ref 140–400)
PMV BLD REES-ECKER: 9.1 FL (ref 7.5–12.5)
POTASSIUM SERPL-SCNC: 4.2 MMOL/L (ref 3.5–5.3)
PROT SERPL-MCNC: 6.1 G/DL (ref 6.1–8.1)
PSA SERPL-MCNC: 3.79 NG/ML
RBC # BLD AUTO: 4.92 MILLION/UL (ref 4.2–5.8)
SODIUM SERPL-SCNC: 138 MMOL/L (ref 135–146)
TRIGL SERPL-MCNC: 59 MG/DL
WBC # BLD AUTO: 8.9 THOUSAND/UL (ref 3.8–10.8)

## 2025-07-10 ENCOUNTER — HOSPITAL ENCOUNTER (OUTPATIENT)
Dept: RADIOLOGY | Facility: HOSPITAL | Age: 55
Discharge: HOME | End: 2025-07-10
Payer: COMMERCIAL

## 2025-07-10 DIAGNOSIS — R91.8 LUNG NODULES: ICD-10-CM

## 2025-07-10 DIAGNOSIS — J45.909 ASTHMA, UNSPECIFIED ASTHMA SEVERITY, UNSPECIFIED WHETHER COMPLICATED, UNSPECIFIED WHETHER PERSISTENT (HHS-HCC): ICD-10-CM

## 2025-07-10 PROCEDURE — 71250 CT THORAX DX C-: CPT

## 2025-07-14 NOTE — PROGRESS NOTES
Subjective   Patient ID:   Los Rubio is a 54 y.o. male who presents for No chief complaint on file..  [facility]  ED follow up 1:  Was in the ED on 6/2/25.  ED note reviewed.  Had a flare up of chronic low back pain.  Was given Lidocaine patches, Prednisone, Robaxin.  No imaging was performed.  Most likely due to wear and tear from work.    ED follow up 2:  Was in the ED on 6/8/25.  ED note reviewed.  Had an asthma exacerbation due to smoking.  Was given Prednisone.  See asthma note below.    Asthma:  I gave a nebulizer and Advair last visit.  I also referred to pulmonology.  Chest CT was stable in July 2025.  Breathing has been worse recently.  Working in difficult breathing environments.    HTN/SVT/Atrial flutter/CHF:  Seeing cardiology.  Taking Carvedilol, Losartan-hydrochlorothiazide  BP today is     Low back pain:  Taking Prednisone, Robaxin.  Off and on depending on activity level.    Corns on feet:  I referred to podiatry last visit.  Symptoms x years.  Has tried to remove them himself without success.    ED:  I gave Viagra last visit.    Health maintenance:  Smoking: Current smoker.  PSA (50+): June 2025  Labs: June 2025  Colonoscopy (50-75): DUE - ordered last visit.  Influenza:    Review of Systems  12 point review of systems negative unless stated above in HPI    There were no vitals filed for this visit.    Physical Exam  General: Alert and oriented, well nourished, no acute distress.  Lungs: Clear to auscultation, non-labored respiration.  Heart: Normal rate, regular rhythm, no murmur, gallop or edema.  Neurologic: Awake, alert, and oriented X3, CN II-XII intact.  Psychiatric: Cooperative, appropriate mood and affect.    Assessment/Plan     Diagnoses and all orders for this visit:  Routine general medical examination at a health care facility  Mild intermittent asthma without status asthmaticus without complication ([facility])  Essential hypertension  Typical atrial flutter (Multi)  SVT (supraventricular  tachycardia)  Chronic systolic congestive heart failure  Cardiomyopathy, unspecified type (Multi)  Chronic bilateral low back pain, unspecified whether sciatica present  Corns  Tobacco abuse

## 2025-07-21 NOTE — PROGRESS NOTES
Subjective   Patient ID:   Los Rubio is a 54 y.o. male who presents for No chief complaint on file..  [facility]  ED follow up 1:  Was in the ED on 6/2/25.  ED note reviewed.  Had a flare up of chronic low back pain.  Was given Lidocaine patches, Prednisone, Robaxin.  No imaging was performed.  Most likely due to wear and tear from work.    ED follow up 2:  Was in the ED on 6/8/25.  ED note reviewed.  Had an asthma exacerbation due to smoking.  Was given Prednisone.  See asthma note below.    Asthma:  I gave a nebulizer and Advair last visit.  I also referred to pulmonology.  Chest CT was stable in July 2025.  Breathing has been worse recently.  Working in difficult breathing environments.    HTN/SVT/Atrial flutter/CHF:  Seeing cardiology.  Taking Carvedilol, Losartan-hydrochlorothiazide  BP today is     Low back pain:  Taking Prednisone, Robaxin.  Off and on depending on activity level.    Corns on feet:  I referred to podiatry last visit.  Symptoms x years.  Has tried to remove them himself without success.    ED:  I gave Viagra last visit.    Health maintenance:  Smoking: Current smoker.  PSA (50+): June 2025  Labs: June 2025  Colonoscopy (50-75): DUE - ordered last visit.  Influenza:    Review of Systems  12 point review of systems negative unless stated above in HPI    There were no vitals filed for this visit.    Physical Exam  General: Alert and oriented, well nourished, no acute distress.  Lungs: Clear to auscultation, non-labored respiration.  Heart: Normal rate, regular rhythm, no murmur, gallop or edema.  Neurologic: Awake, alert, and oriented X3, CN II-XII intact.  Psychiatric: Cooperative, appropriate mood and affect.    Assessment/Plan     Diagnoses and all orders for this visit:  Mild intermittent asthma without status asthmaticus without complication ([facility])  Essential hypertension  Typical atrial flutter (Multi)  SVT (supraventricular tachycardia)  Chronic systolic congestive heart  failure  Cardiomyopathy, unspecified type (Multi)  Chronic bilateral low back pain, unspecified whether sciatica present  Corns  Erectile dysfunction, unspecified erectile dysfunction type

## 2025-07-24 ENCOUNTER — OFFICE VISIT (OUTPATIENT)
Dept: PRIMARY CARE | Facility: CLINIC | Age: 55
End: 2025-07-24
Payer: COMMERCIAL

## 2025-07-24 VITALS
OXYGEN SATURATION: 98 % | DIASTOLIC BLOOD PRESSURE: 84 MMHG | WEIGHT: 155 LBS | BODY MASS INDEX: 21.7 KG/M2 | SYSTOLIC BLOOD PRESSURE: 132 MMHG | HEART RATE: 87 BPM | HEIGHT: 71 IN

## 2025-07-24 DIAGNOSIS — Z72.0 TOBACCO ABUSE: ICD-10-CM

## 2025-07-24 DIAGNOSIS — I10 ESSENTIAL HYPERTENSION: ICD-10-CM

## 2025-07-24 DIAGNOSIS — I42.9 CARDIOMYOPATHY, UNSPECIFIED TYPE (MULTI): ICD-10-CM

## 2025-07-24 DIAGNOSIS — J45.21 MILD INTERMITTENT ASTHMA WITH EXACERBATION (HHS-HCC): ICD-10-CM

## 2025-07-24 DIAGNOSIS — Z23 NEED FOR TDAP VACCINATION: ICD-10-CM

## 2025-07-24 DIAGNOSIS — Z00.00 ROUTINE GENERAL MEDICAL EXAMINATION AT A HEALTH CARE FACILITY: Primary | ICD-10-CM

## 2025-07-24 DIAGNOSIS — J45.20 MILD INTERMITTENT ASTHMA WITHOUT STATUS ASTHMATICUS WITHOUT COMPLICATION (HHS-HCC): ICD-10-CM

## 2025-07-24 DIAGNOSIS — L84 CORNS: ICD-10-CM

## 2025-07-24 DIAGNOSIS — J45.909 ASTHMA, UNSPECIFIED ASTHMA SEVERITY, UNSPECIFIED WHETHER COMPLICATED, UNSPECIFIED WHETHER PERSISTENT (HHS-HCC): ICD-10-CM

## 2025-07-24 DIAGNOSIS — I50.22 CHRONIC SYSTOLIC CONGESTIVE HEART FAILURE: ICD-10-CM

## 2025-07-24 DIAGNOSIS — I48.3 TYPICAL ATRIAL FLUTTER (MULTI): ICD-10-CM

## 2025-07-24 DIAGNOSIS — M54.50 CHRONIC BILATERAL LOW BACK PAIN, UNSPECIFIED WHETHER SCIATICA PRESENT: ICD-10-CM

## 2025-07-24 DIAGNOSIS — I47.10 SVT (SUPRAVENTRICULAR TACHYCARDIA): ICD-10-CM

## 2025-07-24 DIAGNOSIS — G89.29 CHRONIC BILATERAL LOW BACK PAIN, UNSPECIFIED WHETHER SCIATICA PRESENT: ICD-10-CM

## 2025-07-24 PROCEDURE — 90471 IMMUNIZATION ADMIN: CPT | Performed by: PHYSICIAN ASSISTANT

## 2025-07-24 PROCEDURE — 90715 TDAP VACCINE 7 YRS/> IM: CPT | Performed by: PHYSICIAN ASSISTANT

## 2025-07-24 PROCEDURE — 99396 PREV VISIT EST AGE 40-64: CPT | Performed by: PHYSICIAN ASSISTANT

## 2025-07-24 PROCEDURE — 3075F SYST BP GE 130 - 139MM HG: CPT | Performed by: PHYSICIAN ASSISTANT

## 2025-07-24 PROCEDURE — 3079F DIAST BP 80-89 MM HG: CPT | Performed by: PHYSICIAN ASSISTANT

## 2025-07-24 PROCEDURE — 3008F BODY MASS INDEX DOCD: CPT | Performed by: PHYSICIAN ASSISTANT

## 2025-07-24 RX ORDER — ALBUTEROL SULFATE 90 UG/1
2 INHALANT RESPIRATORY (INHALATION) EVERY 6 HOURS PRN
Qty: 18 G | Refills: 1 | Status: SHIPPED | OUTPATIENT
Start: 2025-07-24 | End: 2026-07-24

## 2025-07-24 ASSESSMENT — PAIN SCALES - GENERAL: PAINLEVEL_OUTOF10: 0-NO PAIN

## 2025-07-24 ASSESSMENT — ENCOUNTER SYMPTOMS
DEPRESSION: 0
OCCASIONAL FEELINGS OF UNSTEADINESS: 0
LOSS OF SENSATION IN FEET: 0

## 2025-07-24 NOTE — PROGRESS NOTES
Subjective   Patient ID:   Los Rubio is a 54 y.o. male who presents for Annual Exam.  HPI  Pain scale: 0 (no pain)  Living will? No  POA? No  Are you currently or have you recently been threatened or abused? No  Do you feel unsafe going back to the place you are living? No  Reported health: Fair  Dental visits? No  Hearing problems? Yes - does not wear hearing aids  Vision problems? Yes - wears glasses  Healthy diet? No  Exercise? No exercise  Adequate fluid intake? Yes    ED follow up 1:  Was in the ED on 6/2/25.  ED note reviewed.  Had a flare up of chronic low back pain.  Was given Lidocaine patches, Prednisone, Robaxin.  No imaging was performed.  Most likely due to wear and tear from work.    ED follow up 2:  Was in the ED on 6/8/25.  ED note reviewed.  Had an asthma exacerbation due to smoking.  Was given Prednisone.  See asthma note below.    Asthma:  I gave a nebulizer and Advair last visit.  Breathing has improved a bit.  I also referred to pulmonology - seeing them in Sep 2025.  Chest CT was stable in July 2025.  Working in difficult breathing environments.    HTN/SVT/Atrial flutter/CHF:  Seeing cardiology.  Taking Carvedilol, Losartan-hydrochlorothiazide  BP today is 132/84.    Low back pain:  Taking Prednisone, Robaxin.  Off and on depending on activity level.    Corns on feet:  I referred to podiatry last visit.  Symptoms x years.  Has tried to remove them himself without success.    ED:  I gave Viagra last visit.    Health maintenance:  Smoking: Current smoker.  PSA (50+): June 2025  Labs: June 2025  Colonoscopy (50-75): DUE - ordered last visit.  Influenza:    Social History[1]    There is no immunization history for the selected administration types on file for this patient.    Review of Systems  12 point review of systems negative unless stated above in HPI    Vitals:    07/24/25 1123   BP: 132/84   Pulse: 87   SpO2: 98%     Physical Exam  General: Alert and oriented, well nourished, no acute  distress.  Lungs: Clear to auscultation, non-labored respiration.  Heart: Normal rate, regular rhythm, no murmur, gallop or edema.  Neurologic: Awake, alert, and oriented X3, CN II-XII intact.  Psychiatric: Cooperative, appropriate mood and affect.    Assessment/Plan   It was good seeing you!  This counts as your annual Wellness visit.  Health maintenance was reviewed today.  TDaP given in office.  Labs are up to date.  If symptoms persist or worsen despite current plan of care, please contact your healthcare provider for further evaluation.  Patient instructed to contact the office if there are any questions regarding their care or treatment.   [facility] [phone]  Continue the same medications.  Chronic conditions are stable.  Call with questions or concerns.    Follow up  Yearly and as needed  Diagnoses and all orders for this visit:  Routine general medical examination at a health care facility  Mild intermittent asthma without status asthmaticus without complication ([facility])  Essential hypertension  Typical atrial flutter (Multi)  SVT (supraventricular tachycardia)  Chronic systolic congestive heart failure  Cardiomyopathy, unspecified type (Multi)  Chronic bilateral low back pain, unspecified whether sciatica present  Corns  Tobacco abuse  Mild intermittent asthma with exacerbation ([facility])  -     albuterol 90 mcg/actuation inhaler; Inhale 2 puffs every 6 hours if needed for wheezing.  Asthma, unspecified asthma severity, unspecified whether complicated, unspecified whether persistent ([facility])  -     albuterol 90 mcg/actuation inhaler; Inhale 2 puffs every 6 hours if needed for wheezing.  BMI 21.0-21.9, adult  Need for Tdap vaccination  -     Tdap vaccine, age 7 years and older         [1]   Social History  Tobacco Use    Smoking status: Every Day     Current packs/day: 2.00     Average packs/day: 2.0 packs/day for 35.6 years (71.1 ttl pk-yrs)     Types: Cigarettes     Start date:  1990     Passive exposure: Never    Smokeless tobacco: Former   Vaping Use    Vaping status: Never Used   Substance Use Topics    Alcohol use: Yes    Drug use: Yes     Types: Marijuana

## 2025-07-28 ENCOUNTER — APPOINTMENT (OUTPATIENT)
Dept: PRIMARY CARE | Facility: CLINIC | Age: 55
End: 2025-07-28
Payer: COMMERCIAL

## 2025-07-28 DIAGNOSIS — I50.22 CHRONIC SYSTOLIC CONGESTIVE HEART FAILURE: ICD-10-CM

## 2025-07-28 DIAGNOSIS — I47.10 SVT (SUPRAVENTRICULAR TACHYCARDIA): ICD-10-CM

## 2025-07-28 DIAGNOSIS — I48.3 TYPICAL ATRIAL FLUTTER (MULTI): ICD-10-CM

## 2025-07-28 DIAGNOSIS — I10 ESSENTIAL HYPERTENSION: ICD-10-CM

## 2025-07-28 DIAGNOSIS — G89.29 CHRONIC BILATERAL LOW BACK PAIN, UNSPECIFIED WHETHER SCIATICA PRESENT: ICD-10-CM

## 2025-07-28 DIAGNOSIS — I42.9 CARDIOMYOPATHY, UNSPECIFIED TYPE (MULTI): ICD-10-CM

## 2025-07-28 DIAGNOSIS — L84 CORNS: ICD-10-CM

## 2025-07-28 DIAGNOSIS — J45.20 MILD INTERMITTENT ASTHMA WITHOUT STATUS ASTHMATICUS WITHOUT COMPLICATION (HHS-HCC): Primary | ICD-10-CM

## 2025-07-28 DIAGNOSIS — M54.50 CHRONIC BILATERAL LOW BACK PAIN, UNSPECIFIED WHETHER SCIATICA PRESENT: ICD-10-CM

## 2025-07-28 DIAGNOSIS — N52.9 ERECTILE DYSFUNCTION, UNSPECIFIED ERECTILE DYSFUNCTION TYPE: ICD-10-CM

## 2025-09-06 ENCOUNTER — APPOINTMENT (OUTPATIENT)
Dept: RADIOLOGY | Facility: HOSPITAL | Age: 55
End: 2025-09-06
Payer: COMMERCIAL

## 2025-09-06 ENCOUNTER — HOSPITAL ENCOUNTER (EMERGENCY)
Facility: HOSPITAL | Age: 55
Discharge: HOME | End: 2025-09-06
Attending: EMERGENCY MEDICINE
Payer: COMMERCIAL

## 2025-09-06 ENCOUNTER — APPOINTMENT (OUTPATIENT)
Dept: CARDIOLOGY | Facility: HOSPITAL | Age: 55
End: 2025-09-06
Payer: COMMERCIAL

## 2025-09-06 VITALS
HEIGHT: 71 IN | RESPIRATION RATE: 21 BRPM | SYSTOLIC BLOOD PRESSURE: 163 MMHG | WEIGHT: 165 LBS | BODY MASS INDEX: 23.1 KG/M2 | HEART RATE: 62 BPM | DIASTOLIC BLOOD PRESSURE: 110 MMHG | OXYGEN SATURATION: 98 % | TEMPERATURE: 98.2 F

## 2025-09-06 DIAGNOSIS — J45.20 MILD INTERMITTENT ASTHMA WITHOUT STATUS ASTHMATICUS WITHOUT COMPLICATION (HHS-HCC): ICD-10-CM

## 2025-09-06 DIAGNOSIS — J45.901 MILD ASTHMA WITH EXACERBATION, UNSPECIFIED WHETHER PERSISTENT (HHS-HCC): Primary | ICD-10-CM

## 2025-09-06 DIAGNOSIS — J45.21 MILD INTERMITTENT ASTHMA WITH EXACERBATION (HHS-HCC): ICD-10-CM

## 2025-09-06 DIAGNOSIS — J45.909 ASTHMA, UNSPECIFIED ASTHMA SEVERITY, UNSPECIFIED WHETHER COMPLICATED, UNSPECIFIED WHETHER PERSISTENT (HHS-HCC): ICD-10-CM

## 2025-09-06 DIAGNOSIS — Z72.0 TOBACCO ABUSE: ICD-10-CM

## 2025-09-06 LAB
ALBUMIN SERPL BCP-MCNC: 4.3 G/DL (ref 3.4–5)
ALP SERPL-CCNC: 52 U/L (ref 33–120)
ALT SERPL W P-5'-P-CCNC: 19 U/L (ref 10–52)
ANION GAP SERPL CALCULATED.3IONS-SCNC: 10 MMOL/L (ref 10–20)
AST SERPL W P-5'-P-CCNC: 17 U/L (ref 9–39)
BASOPHILS # BLD AUTO: 0.11 X10*3/UL (ref 0–0.1)
BASOPHILS NFR BLD AUTO: 0.9 %
BILIRUB SERPL-MCNC: 0.4 MG/DL (ref 0–1.2)
BUN SERPL-MCNC: 16 MG/DL (ref 6–23)
CALCIUM SERPL-MCNC: 9.4 MG/DL (ref 8.6–10.3)
CARDIAC TROPONIN I PNL SERPL HS: 4 NG/L (ref 0–20)
CARDIAC TROPONIN I PNL SERPL HS: 4 NG/L (ref 0–20)
CHLORIDE SERPL-SCNC: 101 MMOL/L (ref 98–107)
CO2 SERPL-SCNC: 30 MMOL/L (ref 21–32)
CREAT SERPL-MCNC: 0.99 MG/DL (ref 0.5–1.3)
EGFRCR SERPLBLD CKD-EPI 2021: 90 ML/MIN/1.73M*2
EOSINOPHIL # BLD AUTO: 0.31 X10*3/UL (ref 0–0.7)
EOSINOPHIL NFR BLD AUTO: 2.4 %
ERYTHROCYTE [DISTWIDTH] IN BLOOD BY AUTOMATED COUNT: 16.5 % (ref 11.5–14.5)
FLUAV RNA RESP QL NAA+PROBE: NOT DETECTED
FLUBV RNA RESP QL NAA+PROBE: NOT DETECTED
GLUCOSE SERPL-MCNC: 103 MG/DL (ref 74–99)
HCT VFR BLD AUTO: 41.6 % (ref 41–52)
HGB BLD-MCNC: 13.5 G/DL (ref 13.5–17.5)
IMM GRANULOCYTES # BLD AUTO: 0.05 X10*3/UL (ref 0–0.7)
IMM GRANULOCYTES NFR BLD AUTO: 0.4 % (ref 0–0.9)
LYMPHOCYTES # BLD AUTO: 4.46 X10*3/UL (ref 1.2–4.8)
LYMPHOCYTES NFR BLD AUTO: 35.1 %
MAGNESIUM SERPL-MCNC: 1.89 MG/DL (ref 1.6–2.4)
MCH RBC QN AUTO: 27 PG (ref 26–34)
MCHC RBC AUTO-ENTMCNC: 32.5 G/DL (ref 32–36)
MCV RBC AUTO: 83 FL (ref 80–100)
MONOCYTES # BLD AUTO: 1.27 X10*3/UL (ref 0.1–1)
MONOCYTES NFR BLD AUTO: 10 %
NEUTROPHILS # BLD AUTO: 6.49 X10*3/UL (ref 1.2–7.7)
NEUTROPHILS NFR BLD AUTO: 51.2 %
NRBC BLD-RTO: 0 /100 WBCS (ref 0–0)
PLATELET # BLD AUTO: 375 X10*3/UL (ref 150–450)
POTASSIUM SERPL-SCNC: 3.7 MMOL/L (ref 3.5–5.3)
PROT SERPL-MCNC: 6.6 G/DL (ref 6.4–8.2)
RBC # BLD AUTO: 5 X10*6/UL (ref 4.5–5.9)
SARS-COV-2 RNA RESP QL NAA+PROBE: NOT DETECTED
SODIUM SERPL-SCNC: 137 MMOL/L (ref 136–145)
WBC # BLD AUTO: 12.7 X10*3/UL (ref 4.4–11.3)

## 2025-09-06 PROCEDURE — 87636 SARSCOV2 & INF A&B AMP PRB: CPT | Performed by: EMERGENCY MEDICINE

## 2025-09-06 PROCEDURE — 84484 ASSAY OF TROPONIN QUANT: CPT | Performed by: EMERGENCY MEDICINE

## 2025-09-06 PROCEDURE — 85025 COMPLETE CBC W/AUTO DIFF WBC: CPT | Performed by: EMERGENCY MEDICINE

## 2025-09-06 PROCEDURE — 71045 X-RAY EXAM CHEST 1 VIEW: CPT

## 2025-09-06 PROCEDURE — 36415 COLL VENOUS BLD VENIPUNCTURE: CPT | Performed by: EMERGENCY MEDICINE

## 2025-09-06 PROCEDURE — 2500000002 HC RX 250 W HCPCS SELF ADMINISTERED DRUGS (ALT 637 FOR MEDICARE OP, ALT 636 FOR OP/ED): Performed by: EMERGENCY MEDICINE

## 2025-09-06 PROCEDURE — 80053 COMPREHEN METABOLIC PANEL: CPT | Performed by: EMERGENCY MEDICINE

## 2025-09-06 PROCEDURE — 83735 ASSAY OF MAGNESIUM: CPT | Performed by: EMERGENCY MEDICINE

## 2025-09-06 PROCEDURE — 99285 EMERGENCY DEPT VISIT HI MDM: CPT | Performed by: EMERGENCY MEDICINE

## 2025-09-06 PROCEDURE — 93005 ELECTROCARDIOGRAM TRACING: CPT

## 2025-09-06 PROCEDURE — 2500000004 HC RX 250 GENERAL PHARMACY W/ HCPCS (ALT 636 FOR OP/ED): Mod: JZ | Performed by: EMERGENCY MEDICINE

## 2025-09-06 RX ORDER — ALBUTEROL SULFATE 1.25 MG/3ML
1.25 SOLUTION RESPIRATORY (INHALATION) EVERY 4 HOURS PRN
Qty: 90 ML | Refills: 0 | Status: SHIPPED | OUTPATIENT
Start: 2025-09-06 | End: 2025-12-05

## 2025-09-06 RX ORDER — PREDNISONE 50 MG/1
50 TABLET ORAL DAILY
Qty: 4 TABLET | Refills: 0 | Status: SHIPPED | OUTPATIENT
Start: 2025-09-06 | End: 2025-09-10

## 2025-09-06 RX ORDER — ALBUTEROL SULFATE 90 UG/1
2 INHALANT RESPIRATORY (INHALATION) EVERY 6 HOURS PRN
Qty: 18 G | Refills: 0 | Status: SHIPPED | OUTPATIENT
Start: 2025-09-06 | End: 2026-09-06

## 2025-09-06 RX ORDER — ALBUTEROL SULFATE 0.83 MG/ML
2.5 SOLUTION RESPIRATORY (INHALATION) ONCE
Status: COMPLETED | OUTPATIENT
Start: 2025-09-06 | End: 2025-09-06

## 2025-09-06 RX ORDER — IPRATROPIUM BROMIDE AND ALBUTEROL SULFATE 2.5; .5 MG/3ML; MG/3ML
3 SOLUTION RESPIRATORY (INHALATION) ONCE
Status: COMPLETED | OUTPATIENT
Start: 2025-09-06 | End: 2025-09-06

## 2025-09-06 RX ADMIN — ALBUTEROL SULFATE 2.5 MG: 2.5 SOLUTION RESPIRATORY (INHALATION) at 04:45

## 2025-09-06 RX ADMIN — IPRATROPIUM BROMIDE AND ALBUTEROL SULFATE 3 ML: 2.5; .5 SOLUTION RESPIRATORY (INHALATION) at 03:41

## 2025-09-06 RX ADMIN — ALBUTEROL SULFATE 2.5 MG: 2.5 SOLUTION RESPIRATORY (INHALATION) at 03:40

## 2025-09-06 RX ADMIN — METHYLPREDNISOLONE SODIUM SUCCINATE 125 MG: 125 INJECTION, POWDER, FOR SOLUTION INTRAMUSCULAR; INTRAVENOUS at 03:40

## 2025-09-06 ASSESSMENT — PAIN SCALES - GENERAL
PAINLEVEL_OUTOF10: 0 - NO PAIN
PAINLEVEL_OUTOF10: 0 - NO PAIN

## 2025-09-06 ASSESSMENT — PAIN - FUNCTIONAL ASSESSMENT: PAIN_FUNCTIONAL_ASSESSMENT: 0-10

## 2025-11-03 ENCOUNTER — APPOINTMENT (OUTPATIENT)
Dept: PODIATRY | Facility: CLINIC | Age: 55
End: 2025-11-03
Payer: COMMERCIAL

## (undated) DEVICE — CATHETER, EP, STEERABLE TIP, 6FR, D-CURVE, 2.5-4-2.5MM SPACING (REPROCESSED)

## (undated) DEVICE — CATHETER, ELECTROPHYSIOLOGY, QUADRIPOLAR,  6FR X 120CM, JSN CURVE (REPROCESSED)

## (undated) DEVICE — PACK, ANGIO P2, CUSTOM, LAKE

## (undated) DEVICE — CATHETER, ELECTRODE, STEERABLE, EP-XT, LARGE CURVE, 6 FR X 110 CM

## (undated) DEVICE — ELECTRODE KIT, ENSITE X EP SYSTEM SURFACE

## (undated) DEVICE — CABLE, SHIELDED PIN, 10 QUIK

## (undated) DEVICE — PAD, ELECTRODE DEFIB PADPRO ADULT STRL W/ADAPTER

## (undated) DEVICE — INTRODUCER, SHEATH, FAST-CATH, 7FR X 12CM, C-LOCK

## (undated) DEVICE — INTRODUCER, AGILIS, LRG CURL, 8FR X 71CM, DUAL

## (undated) DEVICE — INTRODUCER, SHEATH, FAST-CATH, 6 FR X 23 CM

## (undated) DEVICE — CABLE, OCTAPOLAR, EASYMATE 8 125CML

## (undated) DEVICE — CATHETER, ABLATION, SAFIRE, 7FR/4MM, LRG CURL, BI-DIRECTIONAL

## (undated) DEVICE — CATHETER, SAFIRE, EXT CABLE, 150CM IBI 100W

## (undated) DEVICE — COVER, EQUIPMENT, ZERO GRAVITY

## (undated) DEVICE — CATHETER, ELCTROPHYSIOLOGY, DIAGNOSTIC, SUPREME, 4 ELECTRODE, 2-5-2 MM SPACING, JOSEPHSON CURVE, 6 FR X 120 CM

## (undated) DEVICE — INTRODUCER, PERCUTANEOUS, SHEATH AVANTI PLUS, W/MINI GUIDEWIRE, STANDARD LENGTH, 8 FR, 11 CM

## (undated) DEVICE — CLOSURE SYSTEM, VASCULAR, MVP 6-12FR, VENOUS